# Patient Record
Sex: MALE | Race: OTHER | HISPANIC OR LATINO | URBAN - METROPOLITAN AREA
[De-identification: names, ages, dates, MRNs, and addresses within clinical notes are randomized per-mention and may not be internally consistent; named-entity substitution may affect disease eponyms.]

---

## 2020-02-03 ENCOUNTER — EMERGENCY (EMERGENCY)
Facility: HOSPITAL | Age: 50
LOS: 1 days | Discharge: HOME | End: 2020-02-03
Attending: EMERGENCY MEDICINE | Admitting: EMERGENCY MEDICINE
Payer: COMMERCIAL

## 2020-02-03 VITALS
HEART RATE: 99 BPM | TEMPERATURE: 98 F | SYSTOLIC BLOOD PRESSURE: 147 MMHG | WEIGHT: 147.05 LBS | OXYGEN SATURATION: 97 % | RESPIRATION RATE: 16 BRPM | HEIGHT: 64 IN | DIASTOLIC BLOOD PRESSURE: 98 MMHG

## 2020-02-03 LAB
ALBUMIN SERPL ELPH-MCNC: 3.5 G/DL — SIGNIFICANT CHANGE UP (ref 3.3–5)
ALP SERPL-CCNC: 116 U/L — SIGNIFICANT CHANGE UP (ref 40–120)
ALT FLD-CCNC: 28 U/L — SIGNIFICANT CHANGE UP (ref 12–78)
ANION GAP SERPL CALC-SCNC: 7 MMOL/L — SIGNIFICANT CHANGE UP (ref 5–17)
APTT BLD: 32.2 SEC — SIGNIFICANT CHANGE UP (ref 28.5–37)
AST SERPL-CCNC: 19 U/L — SIGNIFICANT CHANGE UP (ref 15–37)
BASOPHILS # BLD AUTO: 0.04 K/UL — SIGNIFICANT CHANGE UP (ref 0–0.2)
BASOPHILS NFR BLD AUTO: 0.5 % — SIGNIFICANT CHANGE UP (ref 0–2)
BILIRUB SERPL-MCNC: 0.2 MG/DL — SIGNIFICANT CHANGE UP (ref 0.2–1.2)
BUN SERPL-MCNC: 19 MG/DL — SIGNIFICANT CHANGE UP (ref 7–23)
CALCIUM SERPL-MCNC: 8.9 MG/DL — SIGNIFICANT CHANGE UP (ref 8.5–10.1)
CHLORIDE SERPL-SCNC: 103 MMOL/L — SIGNIFICANT CHANGE UP (ref 96–108)
CO2 SERPL-SCNC: 27 MMOL/L — SIGNIFICANT CHANGE UP (ref 22–31)
CREAT SERPL-MCNC: 0.93 MG/DL — SIGNIFICANT CHANGE UP (ref 0.5–1.3)
EOSINOPHIL # BLD AUTO: 0.16 K/UL — SIGNIFICANT CHANGE UP (ref 0–0.5)
EOSINOPHIL NFR BLD AUTO: 1.8 % — SIGNIFICANT CHANGE UP (ref 0–6)
FLU A RESULT: SIGNIFICANT CHANGE UP
FLU A RESULT: SIGNIFICANT CHANGE UP
FLUAV AG NPH QL: SIGNIFICANT CHANGE UP
FLUBV AG NPH QL: SIGNIFICANT CHANGE UP
GLUCOSE SERPL-MCNC: 273 MG/DL — HIGH (ref 70–99)
HCT VFR BLD CALC: 43.5 % — SIGNIFICANT CHANGE UP (ref 39–50)
HGB BLD-MCNC: 15.2 G/DL — SIGNIFICANT CHANGE UP (ref 13–17)
IMM GRANULOCYTES NFR BLD AUTO: 0.7 % — SIGNIFICANT CHANGE UP (ref 0–1.5)
INR BLD: 0.84 RATIO — LOW (ref 0.88–1.16)
LACTATE SERPL-SCNC: 1.8 MMOL/L — SIGNIFICANT CHANGE UP (ref 0.7–2)
LIDOCAIN IGE QN: 905 U/L — HIGH (ref 73–393)
LYMPHOCYTES # BLD AUTO: 3.5 K/UL — HIGH (ref 1–3.3)
LYMPHOCYTES # BLD AUTO: 39.5 % — SIGNIFICANT CHANGE UP (ref 13–44)
MCHC RBC-ENTMCNC: 28.6 PG — SIGNIFICANT CHANGE UP (ref 27–34)
MCHC RBC-ENTMCNC: 34.9 GM/DL — SIGNIFICANT CHANGE UP (ref 32–36)
MCV RBC AUTO: 81.9 FL — SIGNIFICANT CHANGE UP (ref 80–100)
MONOCYTES # BLD AUTO: 0.59 K/UL — SIGNIFICANT CHANGE UP (ref 0–0.9)
MONOCYTES NFR BLD AUTO: 6.7 % — SIGNIFICANT CHANGE UP (ref 2–14)
NEUTROPHILS # BLD AUTO: 4.5 K/UL — SIGNIFICANT CHANGE UP (ref 1.8–7.4)
NEUTROPHILS NFR BLD AUTO: 50.8 % — SIGNIFICANT CHANGE UP (ref 43–77)
NRBC # BLD: 0 /100 WBCS — SIGNIFICANT CHANGE UP (ref 0–0)
PLATELET # BLD AUTO: 227 K/UL — SIGNIFICANT CHANGE UP (ref 150–400)
POTASSIUM SERPL-MCNC: 4.2 MMOL/L — SIGNIFICANT CHANGE UP (ref 3.5–5.3)
POTASSIUM SERPL-SCNC: 4.2 MMOL/L — SIGNIFICANT CHANGE UP (ref 3.5–5.3)
PROT SERPL-MCNC: 7.4 G/DL — SIGNIFICANT CHANGE UP (ref 6–8.3)
PROTHROM AB SERPL-ACNC: 9.3 SEC — LOW (ref 10–12.9)
RBC # BLD: 5.31 M/UL — SIGNIFICANT CHANGE UP (ref 4.2–5.8)
RBC # FLD: 12.1 % — SIGNIFICANT CHANGE UP (ref 10.3–14.5)
RSV RESULT: SIGNIFICANT CHANGE UP
RSV RNA RESP QL NAA+PROBE: SIGNIFICANT CHANGE UP
SODIUM SERPL-SCNC: 137 MMOL/L — SIGNIFICANT CHANGE UP (ref 135–145)
TROPONIN I SERPL-MCNC: <.015 NG/ML — SIGNIFICANT CHANGE UP (ref 0.01–0.04)
WBC # BLD: 8.85 K/UL — SIGNIFICANT CHANGE UP (ref 3.8–10.5)
WBC # FLD AUTO: 8.85 K/UL — SIGNIFICANT CHANGE UP (ref 3.8–10.5)

## 2020-02-03 PROCEDURE — 74177 CT ABD & PELVIS W/CONTRAST: CPT | Mod: 26

## 2020-02-03 PROCEDURE — 93010 ELECTROCARDIOGRAM REPORT: CPT

## 2020-02-03 PROCEDURE — 99285 EMERGENCY DEPT VISIT HI MDM: CPT

## 2020-02-03 PROCEDURE — 73030 X-RAY EXAM OF SHOULDER: CPT | Mod: 26,LT

## 2020-02-03 PROCEDURE — 71046 X-RAY EXAM CHEST 2 VIEWS: CPT | Mod: 26

## 2020-02-03 RX ORDER — KETOROLAC TROMETHAMINE 30 MG/ML
30 SYRINGE (ML) INJECTION ONCE
Refills: 0 | Status: DISCONTINUED | OUTPATIENT
Start: 2020-02-03 | End: 2020-02-03

## 2020-02-03 RX ORDER — ONDANSETRON 8 MG/1
4 TABLET, FILM COATED ORAL ONCE
Refills: 0 | Status: COMPLETED | OUTPATIENT
Start: 2020-02-03 | End: 2020-02-03

## 2020-02-03 RX ORDER — SODIUM CHLORIDE 9 MG/ML
1000 INJECTION INTRAMUSCULAR; INTRAVENOUS; SUBCUTANEOUS ONCE
Refills: 0 | Status: COMPLETED | OUTPATIENT
Start: 2020-02-03 | End: 2020-02-03

## 2020-02-03 RX ADMIN — Medication 30 MILLIGRAM(S): at 22:47

## 2020-02-03 RX ADMIN — SODIUM CHLORIDE 1000 MILLILITER(S): 9 INJECTION INTRAMUSCULAR; INTRAVENOUS; SUBCUTANEOUS at 22:00

## 2020-02-03 RX ADMIN — SODIUM CHLORIDE 1000 MILLILITER(S): 9 INJECTION INTRAMUSCULAR; INTRAVENOUS; SUBCUTANEOUS at 22:47

## 2020-02-03 RX ADMIN — ONDANSETRON 4 MILLIGRAM(S): 8 TABLET, FILM COATED ORAL at 20:48

## 2020-02-03 RX ADMIN — SODIUM CHLORIDE 1000 MILLILITER(S): 9 INJECTION INTRAMUSCULAR; INTRAVENOUS; SUBCUTANEOUS at 20:49

## 2020-02-03 NOTE — ED PROVIDER NOTE - MUSCULOSKELETAL, MLM
Spine appears normal, range of motion is not limited, no muscle or joint tenderness nrom of shoulder

## 2020-02-03 NOTE — ED PROVIDER NOTE - NSFOLLOWUPINSTRUCTIONS_ED_ALL_ED_FT
Return to the ED for any new or worsening symptoms  Take your medication as prescribed  Naproxen 1 tab 2 times a day as needed for shoulder pain  Zofran per label instructions as needed for nausea   Flonase 2 sprays to each nostril daily   Singulair 1 tab daily   Clear liquids advance as tolerated  Follow up with an orthopedist if your shoulder pain continues   Follow up with a gastroenterologist call tomorrow to schedule a follow up  Advance activity as tolerated

## 2020-02-03 NOTE — ED PROVIDER NOTE - PATIENT PORTAL LINK FT
You can access the FollowMyHealth Patient Portal offered by [facility] by registering at the following website: [email or web address]. By joining [employer]’s FollowMyHealth portal, you will also be able to view your health information using other applications (apps) compatible with our system.

## 2020-02-03 NOTE — ED PROVIDER NOTE - OBJECTIVE STATEMENT
Pt is a 50 yo male with pmhx of dm and HLD c/o of chills and myalgias which started today. He reports that he has been experiencing nasal congestion, with associated chills, sore throat, nausea, and diffuse myalgias. He also reports that he has noted that he is experiencing left shoulder pain which is more significant then the rest of his myalgias.  Denies known fevers, V/D/C, CP, SOB, cough, abd pain. Pt denies pleuritic chest pain.  Pt did receive his flu vaccine this year. Pt denies sick contacts recent travels.

## 2020-02-03 NOTE — ED ADULT NURSE NOTE - CHPI ED NUR SYMPTOMS NEG
no decreased eating/drinking/no dizziness/no loss of consciousness/no vomiting/no back pain/no fever/no chills/no headache

## 2020-02-03 NOTE — ED PROVIDER NOTE - CLINICAL SUMMARY MEDICAL DECISION MAKING FREE TEXT BOX
Pt is a 50 yo male with chills congestion will get labs flu swab   left shoulder pain will get cardiac enzymes cxr

## 2020-02-03 NOTE — ED PROVIDER NOTE - ENMT, MLM
Airway patent, nasal congestion. Mouth with normal mucosa. Throat has no vesicles, no oropharyngeal exudates and uvula is midline.

## 2020-02-03 NOTE — ED PROVIDER NOTE - CARE PLAN
Principal Discharge DX:	URI (upper respiratory infection)  Secondary Diagnosis:	Pancreatitis  Secondary Diagnosis:	Shoulder pain

## 2020-02-03 NOTE — ED PROVIDER NOTE - CARE PROVIDER_API CALL
Ernesto Truong ()  Internal Medicine  [address] [place] [address]  Phone: [phone]  Fax: [phone]  Follow Up Time:     Neno Stoner)  Orthopaedic Surgery  [address] [place] [address]  Phone: [phone]  Fax: [phone]  Follow Up Time:

## 2020-02-03 NOTE — ED PROVIDER NOTE - ATTENDING CONTRIBUTION TO CARE
Pt is a 50 yo male who presents to the ED with a cc of chills and myalgias.  PMHx of Diabetes mellitus    Hypercholesteremia.  Pt reports that symptoms began today.  He reports that he has been experiencing nasal congestion, with associated chills, sore throat, nausea, and diffuse myalgias.  He also reports that he has noted that he is experiencing left shoulder pain which is more significant then the rest of his myalgias.  Denies known fevers, V/D/C, CP, SOB, cough, abd pain.  Pt denies pleuritic chest pain.  Pt did receive his flu vaccine this year.  On exam pt lying in bed NAD, NCAT, throat with diffuse erythema no exudate noted, heart RRR, lungs CTA, abd soft NT/ND.  Pt with what appears to be viral URI syndrome but given h/o left shoulder pain and pt known DM with exclude underlying cardiac disease.  Will obtain screening labs, chest x-ray, left shoulder x-ray, and swab for flu.  Will monitor

## 2020-02-03 NOTE — ED ADULT NURSE NOTE - OBJECTIVE STATEMENT
patient came in ED from home with c/o body ache and nausea X 16:30H today. afebrile. non-labored respiration noted. abdomen nondistended, nontender.

## 2020-02-04 VITALS
HEART RATE: 98 BPM | DIASTOLIC BLOOD PRESSURE: 67 MMHG | TEMPERATURE: 98 F | RESPIRATION RATE: 17 BRPM | OXYGEN SATURATION: 98 % | SYSTOLIC BLOOD PRESSURE: 133 MMHG

## 2020-02-04 LAB — TROPONIN I SERPL-MCNC: <.015 NG/ML — SIGNIFICANT CHANGE UP (ref 0.01–0.04)

## 2020-02-04 PROCEDURE — 36415 COLL VENOUS BLD VENIPUNCTURE: CPT

## 2020-02-04 PROCEDURE — 83690 ASSAY OF LIPASE: CPT

## 2020-02-04 PROCEDURE — 93005 ELECTROCARDIOGRAM TRACING: CPT

## 2020-02-04 PROCEDURE — 99284 EMERGENCY DEPT VISIT MOD MDM: CPT | Mod: 25

## 2020-02-04 PROCEDURE — 80053 COMPREHEN METABOLIC PANEL: CPT

## 2020-02-04 PROCEDURE — 87631 RESP VIRUS 3-5 TARGETS: CPT

## 2020-02-04 PROCEDURE — 71046 X-RAY EXAM CHEST 2 VIEWS: CPT

## 2020-02-04 PROCEDURE — 73030 X-RAY EXAM OF SHOULDER: CPT

## 2020-02-04 PROCEDURE — 85730 THROMBOPLASTIN TIME PARTIAL: CPT

## 2020-02-04 PROCEDURE — 83605 ASSAY OF LACTIC ACID: CPT

## 2020-02-04 PROCEDURE — 96361 HYDRATE IV INFUSION ADD-ON: CPT

## 2020-02-04 PROCEDURE — 84484 ASSAY OF TROPONIN QUANT: CPT

## 2020-02-04 PROCEDURE — 96375 TX/PRO/DX INJ NEW DRUG ADDON: CPT

## 2020-02-04 PROCEDURE — 74177 CT ABD & PELVIS W/CONTRAST: CPT

## 2020-02-04 PROCEDURE — 85610 PROTHROMBIN TIME: CPT

## 2020-02-04 PROCEDURE — 85027 COMPLETE CBC AUTOMATED: CPT

## 2020-02-04 PROCEDURE — 96374 THER/PROPH/DIAG INJ IV PUSH: CPT | Mod: XU

## 2020-02-04 RX ORDER — ONDANSETRON 8 MG/1
1 TABLET, FILM COATED ORAL
Qty: 15 | Refills: 0
Start: 2020-02-04 | End: 2020-02-08

## 2020-02-04 RX ORDER — FLUTICASONE PROPIONATE 50 MCG
2 SPRAY, SUSPENSION NASAL
Qty: 1 | Refills: 0
Start: 2020-02-04 | End: 2020-03-04

## 2020-02-04 RX ORDER — MONTELUKAST 4 MG/1
1 TABLET, CHEWABLE ORAL
Qty: 30 | Refills: 0
Start: 2020-02-04 | End: 2020-03-04

## 2020-02-04 RX ADMIN — Medication 30 MILLIGRAM(S): at 01:04

## 2020-03-31 ENCOUNTER — INPATIENT (INPATIENT)
Facility: HOSPITAL | Age: 50
LOS: 0 days | Discharge: HOME | DRG: 177 | End: 2020-04-01
Attending: FAMILY MEDICINE | Admitting: FAMILY MEDICINE
Payer: COMMERCIAL

## 2020-03-31 VITALS
HEART RATE: 95 BPM | OXYGEN SATURATION: 97 % | SYSTOLIC BLOOD PRESSURE: 119 MMHG | WEIGHT: 147.05 LBS | HEIGHT: 64 IN | RESPIRATION RATE: 18 BRPM | DIASTOLIC BLOOD PRESSURE: 77 MMHG | TEMPERATURE: 100 F

## 2020-03-31 DIAGNOSIS — E87.1 HYPO-OSMOLALITY AND HYPONATREMIA: ICD-10-CM

## 2020-03-31 DIAGNOSIS — B97.21 SARS-ASSOCIATED CORONAVIRUS AS THE CAUSE OF DISEASES CLASSIFIED ELSEWHERE: ICD-10-CM

## 2020-03-31 DIAGNOSIS — J12.9 VIRAL PNEUMONIA, UNSPECIFIED: ICD-10-CM

## 2020-03-31 DIAGNOSIS — Z29.9 ENCOUNTER FOR PROPHYLACTIC MEASURES, UNSPECIFIED: ICD-10-CM

## 2020-03-31 DIAGNOSIS — Z71.89 OTHER SPECIFIED COUNSELING: ICD-10-CM

## 2020-03-31 DIAGNOSIS — E78.00 PURE HYPERCHOLESTEROLEMIA, UNSPECIFIED: ICD-10-CM

## 2020-03-31 DIAGNOSIS — E11.9 TYPE 2 DIABETES MELLITUS WITHOUT COMPLICATIONS: ICD-10-CM

## 2020-03-31 LAB
ALBUMIN SERPL ELPH-MCNC: 2.7 G/DL — LOW (ref 3.3–5)
ALP SERPL-CCNC: 60 U/L — SIGNIFICANT CHANGE UP (ref 40–120)
ALT FLD-CCNC: 19 U/L — SIGNIFICANT CHANGE UP (ref 12–78)
ANION GAP SERPL CALC-SCNC: 6 MMOL/L — SIGNIFICANT CHANGE UP (ref 5–17)
AST SERPL-CCNC: 27 U/L — SIGNIFICANT CHANGE UP (ref 15–37)
BASOPHILS # BLD AUTO: 0 K/UL — SIGNIFICANT CHANGE UP (ref 0–0.2)
BASOPHILS NFR BLD AUTO: 0 % — SIGNIFICANT CHANGE UP (ref 0–2)
BILIRUB SERPL-MCNC: 0.3 MG/DL — SIGNIFICANT CHANGE UP (ref 0.2–1.2)
BUN SERPL-MCNC: 15 MG/DL — SIGNIFICANT CHANGE UP (ref 7–23)
CALCIUM SERPL-MCNC: 9 MG/DL — SIGNIFICANT CHANGE UP (ref 8.5–10.1)
CHLORIDE SERPL-SCNC: 99 MMOL/L — SIGNIFICANT CHANGE UP (ref 96–108)
CO2 SERPL-SCNC: 27 MMOL/L — SIGNIFICANT CHANGE UP (ref 22–31)
CREAT SERPL-MCNC: 1 MG/DL — SIGNIFICANT CHANGE UP (ref 0.5–1.3)
EOSINOPHIL # BLD AUTO: 0 K/UL — SIGNIFICANT CHANGE UP (ref 0–0.5)
EOSINOPHIL NFR BLD AUTO: 0 % — SIGNIFICANT CHANGE UP (ref 0–6)
GLUCOSE SERPL-MCNC: 326 MG/DL — HIGH (ref 70–99)
HCT VFR BLD CALC: 42.1 % — SIGNIFICANT CHANGE UP (ref 39–50)
HGB BLD-MCNC: 14.5 G/DL — SIGNIFICANT CHANGE UP (ref 13–17)
LACTATE SERPL-SCNC: 1.9 MMOL/L — SIGNIFICANT CHANGE UP (ref 0.7–2)
LYMPHOCYTES # BLD AUTO: 0.61 K/UL — LOW (ref 1–3.3)
LYMPHOCYTES # BLD AUTO: 11 % — LOW (ref 13–44)
MCHC RBC-ENTMCNC: 28.7 PG — SIGNIFICANT CHANGE UP (ref 27–34)
MCHC RBC-ENTMCNC: 34.4 GM/DL — SIGNIFICANT CHANGE UP (ref 32–36)
MCV RBC AUTO: 83.2 FL — SIGNIFICANT CHANGE UP (ref 80–100)
MONOCYTES # BLD AUTO: 0.28 K/UL — SIGNIFICANT CHANGE UP (ref 0–0.9)
MONOCYTES NFR BLD AUTO: 5 % — SIGNIFICANT CHANGE UP (ref 2–14)
NEUTROPHILS # BLD AUTO: 4.05 K/UL — SIGNIFICANT CHANGE UP (ref 1.8–7.4)
NEUTROPHILS NFR BLD AUTO: 66 % — SIGNIFICANT CHANGE UP (ref 43–77)
NRBC # BLD: SIGNIFICANT CHANGE UP /100 WBCS (ref 0–0)
PLATELET # BLD AUTO: 231 K/UL — SIGNIFICANT CHANGE UP (ref 150–400)
POTASSIUM SERPL-MCNC: 4.7 MMOL/L — SIGNIFICANT CHANGE UP (ref 3.5–5.3)
POTASSIUM SERPL-SCNC: 4.7 MMOL/L — SIGNIFICANT CHANGE UP (ref 3.5–5.3)
PROT SERPL-MCNC: 7.8 G/DL — SIGNIFICANT CHANGE UP (ref 6–8.3)
RBC # BLD: 5.06 M/UL — SIGNIFICANT CHANGE UP (ref 4.2–5.8)
RBC # FLD: 12.3 % — SIGNIFICANT CHANGE UP (ref 10.3–14.5)
SODIUM SERPL-SCNC: 132 MMOL/L — LOW (ref 135–145)
WBC # BLD: 5.55 K/UL — SIGNIFICANT CHANGE UP (ref 3.8–10.5)
WBC # FLD AUTO: 5.55 K/UL — SIGNIFICANT CHANGE UP (ref 3.8–10.5)

## 2020-03-31 PROCEDURE — 99223 1ST HOSP IP/OBS HIGH 75: CPT | Mod: GC

## 2020-03-31 PROCEDURE — 71045 X-RAY EXAM CHEST 1 VIEW: CPT | Mod: 26

## 2020-03-31 PROCEDURE — 99285 EMERGENCY DEPT VISIT HI MDM: CPT

## 2020-03-31 RX ORDER — OMEGA-3 ACID ETHYL ESTERS 1 G
4 CAPSULE ORAL DAILY
Refills: 0 | Status: DISCONTINUED | OUTPATIENT
Start: 2020-03-31 | End: 2020-04-01

## 2020-03-31 RX ORDER — ACETAMINOPHEN 500 MG
650 TABLET ORAL EVERY 4 HOURS
Refills: 0 | Status: DISCONTINUED | OUTPATIENT
Start: 2020-03-31 | End: 2020-04-01

## 2020-03-31 RX ORDER — GUAIFENESIN/DEXTROMETHORPHAN 600MG-30MG
10 TABLET, EXTENDED RELEASE 12 HR ORAL EVERY 4 HOURS
Refills: 0 | Status: DISCONTINUED | OUTPATIENT
Start: 2020-03-31 | End: 2020-04-01

## 2020-03-31 RX ORDER — INSULIN LISPRO 100/ML
VIAL (ML) SUBCUTANEOUS
Refills: 0 | Status: DISCONTINUED | OUTPATIENT
Start: 2020-03-31 | End: 2020-04-01

## 2020-03-31 RX ORDER — ENOXAPARIN SODIUM 100 MG/ML
40 INJECTION SUBCUTANEOUS DAILY
Refills: 0 | Status: DISCONTINUED | OUTPATIENT
Start: 2020-03-31 | End: 2020-04-01

## 2020-03-31 RX ORDER — GLUCAGON INJECTION, SOLUTION 0.5 MG/.1ML
1 INJECTION, SOLUTION SUBCUTANEOUS ONCE
Refills: 0 | Status: DISCONTINUED | OUTPATIENT
Start: 2020-03-31 | End: 2020-04-01

## 2020-03-31 RX ORDER — SODIUM CHLORIDE 9 MG/ML
1000 INJECTION INTRAMUSCULAR; INTRAVENOUS; SUBCUTANEOUS ONCE
Refills: 0 | Status: COMPLETED | OUTPATIENT
Start: 2020-03-31 | End: 2020-03-31

## 2020-03-31 RX ORDER — ALBUTEROL 90 UG/1
2 AEROSOL, METERED ORAL EVERY 4 HOURS
Refills: 0 | Status: DISCONTINUED | OUTPATIENT
Start: 2020-03-31 | End: 2020-04-01

## 2020-03-31 RX ORDER — DEXTROSE 50 % IN WATER 50 %
15 SYRINGE (ML) INTRAVENOUS ONCE
Refills: 0 | Status: DISCONTINUED | OUTPATIENT
Start: 2020-03-31 | End: 2020-04-01

## 2020-03-31 RX ORDER — SODIUM CHLORIDE 9 MG/ML
1000 INJECTION, SOLUTION INTRAVENOUS
Refills: 0 | Status: DISCONTINUED | OUTPATIENT
Start: 2020-03-31 | End: 2020-04-01

## 2020-03-31 RX ORDER — DEXTROSE 50 % IN WATER 50 %
12.5 SYRINGE (ML) INTRAVENOUS ONCE
Refills: 0 | Status: DISCONTINUED | OUTPATIENT
Start: 2020-03-31 | End: 2020-04-01

## 2020-03-31 RX ORDER — ACETAMINOPHEN 500 MG
650 TABLET ORAL ONCE
Refills: 0 | Status: COMPLETED | OUTPATIENT
Start: 2020-03-31 | End: 2020-03-31

## 2020-03-31 RX ORDER — AZITHROMYCIN 500 MG/1
1 TABLET, FILM COATED ORAL
Qty: 0 | Refills: 0 | DISCHARGE
Start: 2020-03-31 | End: 2020-04-04

## 2020-03-31 RX ADMIN — SODIUM CHLORIDE 1000 MILLILITER(S): 9 INJECTION INTRAMUSCULAR; INTRAVENOUS; SUBCUTANEOUS at 14:12

## 2020-03-31 RX ADMIN — Medication 3: at 18:17

## 2020-03-31 RX ADMIN — Medication 650 MILLIGRAM(S): at 14:12

## 2020-03-31 RX ADMIN — SODIUM CHLORIDE 1000 MILLILITER(S): 9 INJECTION INTRAMUSCULAR; INTRAVENOUS; SUBCUTANEOUS at 15:12

## 2020-03-31 NOTE — ED PROVIDER NOTE - CONSTITUTIONAL, MLM
normal... Well appearing, awake, alert, oriented to person, place, time/situation and in no apparent distress. Well appearing, awake, alert, oriented to person, place, time/situation mild tachypnea 91% room air

## 2020-03-31 NOTE — H&P ADULT - NSHPPHYSICALEXAM_GEN_ALL_CORE
Vital Signs Last 24 Hrs  T(C): 36.9 (31 Mar 2020 18:15), Max: 37.7 (31 Mar 2020 12:22)  T(F): 98.5 (31 Mar 2020 18:15), Max: 99.9 (31 Mar 2020 12:22)  HR: 84 (31 Mar 2020 18:15) (84 - 95)  BP: 117/74 (31 Mar 2020 18:15) (116/70 - 119/77)  BP(mean): --  RR: 18 (31 Mar 2020 18:15) (18 - 18)  SpO2: 94% (31 Mar 2020 18:15) (94% - 97%)    Patient comfortable appearing , not in acute distress , oxygen saturation 95% on ra     please refer to ED exam for complete exam details

## 2020-03-31 NOTE — ED PROVIDER NOTE - OBJECTIVE STATEMENT
Pt is a 50 yo male with pmhx of dm and hld c/o of worsening cough sob fever chills. Pt states he was tested for covid 2 weeks ago and tested positive. Pt has been taking Tylenol last dose last night. But shortness of breath worse denies any nvd abdominal pain. Pt co of chest pain with cough.

## 2020-03-31 NOTE — ED ADULT NURSE NOTE - NSIMPLEMENTINTERV_GEN_ALL_ED
Implemented All Universal Safety Interventions:  [place] to call system. Call bell, personal items and telephone within reach. Instruct patient to call for assistance. Room bathroom lighting operational. Non-slip footwear when patient is off stretcher. Physically safe environment: no spills, clutter or unnecessary equipment. Stretcher in lowest position, wheels locked, appropriate side rails in place.

## 2020-03-31 NOTE — ED PROVIDER NOTE - ATTENDING CONTRIBUTION TO CARE
50 yo M p/w coug, URI sx x past ~ 2 weeks. Pt with known COVID positive. Now pt with some inc dyspnea over past few days. No chest pain. no neck pain / stiffness. Pos gen achiness.   No other acute co;  exam: MM Moist. neck supple. slight tachypnea. no acc muscle use. non-toxic appearing. SOme hypoxia noted, exp with exertion.  Check labs, xr, ? TBA

## 2020-03-31 NOTE — H&P ADULT - PROBLEM SELECTOR PLAN 3
Continue home Vascepa 2g bid (patient was prescribed this per pharmacy at the beginning of March) DM2  f/u HgA1c  Start low dose insulin sliding scale with accuchecks  Hypogylecmia protocol DM2 on Lantus and humalog at home. Family did not know dosing and [facility] not [facility] dose [facility]. Recommend calling Endocrine or PCP. Add Lantus qhs and Humalog depending on accuchecks.   f/u HgA1c  Start low dose insulin sliding scale with accuchecks  Hypogylecmia protocol

## 2020-03-31 NOTE — H&P ADULT - PROBLEM SELECTOR PLAN 4
In setting of likely decreased ambulatory status while being hospitalized, recommend lovenox 40mg qd.   IMPROVE VTE Individual Risk Assessment          RISK                                                          Points  [  ] Previous VTE                                                3  [  ] Thrombophilia                                             2  [  ] Lower limb paralysis                                   2        (unable to hold up >15 seconds)    [  ] Current Cancer                                             2         (within 6 months)  [  ] Immobilization > 24 hrs                              1  [  ] ICU/CCU stay > 24 hours                             1  [  ] Age > 60                                                         1    IMPROVE VTE Score: 0 Continue home Vascepa 2g bid (patient was prescribed this per pharmacy at the beginning of March) Continue home Vascepa 2g bid (patient was prescribed this per pharmacy at the beginning of March). Therapeutic interchange with [place] 3 while in hospital.

## 2020-03-31 NOTE — H&P ADULT - PROBLEM SELECTOR PLAN 1
Admit to F for PNA in setting of known +COVID19 viral illness given worsening clinical presentation, CXR w/ new b/l patchy opacities  - NLR: 6.64, NEWS2: 3   - may use O2 NC, Venturi Mask, NRB as needed depending on oxygen demand  - Avoid HFNC/NIPPV/aerosolizing procedures i.e. nebulizations  - Avoid NSAIDs, use tylenol PRN fevers  - Maintain strict isolation precautions, use proper PPE   - daily CBC with diff to follow eosinophils, lymphocytes and neutrophils; daily CK  - given risk factors: check LDH, CRP, ferritin, ESR, PT/PTT, CK, lactate, troponin, NLR, d-dimer, procalcitonin  - baseline EKG - monitor for cardiac decompensation, monitor telemetry  - monitor respiratory status closely   - Code Status: TBD Admit to F for PNA in setting of known +COVID19 viral illness given worsening clinical presentation, CXR w/ new b/l patchy opacities  - NLR: 6.64, NEWS2: 3   - may use O2 NC, Venturi Mask, NRB as needed depending on oxygen demand  - Avoid HFNC/NIPPV/aerosolizing procedures i.e. nebulizations  - Avoid NSAIDs, use tylenol PRN fevers  - will hold off on continuing home Azithromycin   - Maintain strict isolation precautions, use proper PPE   - daily CBC with diff to follow eosinophils, lymphocytes and neutrophils; daily CK  - given risk factors: check LDH, CRP, ferritin, ESR, PT/PTT, CK, lactate, troponin, NLR, d-dimer, procalcitonin  - baseline EKG - monitor for cardiac decompensation, monitor telemetry  - monitor respiratory status closely   - Code Status: TBD Admit to F for PNA in setting of known +COVID19 viral illness given worsening clinical presentation, CXR w/ new b/l patchy opacities  - NLR: 6.64, NEWS2: 3   - may use O2 NC, Venturi Mask, NRB as needed depending on oxygen demand  - Avoid HFNC/NIPPV/aerosolizing procedures i.e. nebulizations  - Avoid NSAIDs, use tylenol PRN fevers  - will hold off on continuing home Azithromycin   - Maintain strict isolation precautions, use proper PPE   - daily CBC with diff to follow eosinophils, lymphocytes and neutrophils; daily CK  - given risk factors: check LDH, CRP, ferritin, ESR, PT/PTT, CK, lactate, troponin, NLR, d-dimer, procalcitonin  - baseline EKG - monitor for cardiac decompensation, monitor telemetry  - monitor respiratory status closely   - Code Status: full code

## 2020-03-31 NOTE — H&P ADULT - ATTENDING COMMENTS
49M w/ PMHx of DM2 and HLD  p/w cough, sob, fevers, and chills  x 2 weeks ago s/p azithromycin , tested  +COVID as outpatient , cxr w/ findings c/w viral pneumonia , will monitor closely , continue oxygen support as needed , obtain ekg to check for qtc , can continue insulin correction scale . Patient Full code .

## 2020-03-31 NOTE — H&P ADULT - PROBLEM SELECTOR PLAN 2
DM2  f/u HgA1c  Start low dose insulin sliding scale with accuchecks  Hypogylecmia protocol Na 132 on admission, likely in setting of decreased PO intake.   - monitor CMP and for clinical signs of hyponatremia (n/v, change in mental status, etc)

## 2020-03-31 NOTE — ED PROVIDER NOTE - CLINICAL SUMMARY MEDICAL DECISION MAKING FREE TEXT BOX
Pt is a 50 yo male with sob +covid o2 sat with walking 88 % will get labs cxr cultures give Tylenol and fluids

## 2020-03-31 NOTE — H&P ADULT - PROBLEM SELECTOR PLAN 5
In setting of likely decreased ambulatory status while being hospitalized, recommend lovenox 40mg qd.   IMPROVE VTE Individual Risk Assessment          RISK                                                          Points  [  ] Previous VTE                                                3  [  ] Thrombophilia                                             2  [  ] Lower limb paralysis                                   2        (unable to hold up >15 seconds)    [  ] Current Cancer                                             2         (within 6 months)  [  ] Immobilization > 24 hrs                              1  [  ] ICU/CCU stay > 24 hours                             1  [  ] Age > 60                                                         1    IMPROVE VTE Score: 0

## 2020-03-31 NOTE — H&P ADULT - ASSESSMENT
49 year old Male with PMHx of DM2 and HLD presenting with c/o worsening cough, sob, fevers, and chills which started 2 weeks ago (3/17), admitted for known +COVID pneumonia based on clinical presentation and imaging.

## 2020-03-31 NOTE — H&P ADULT - HISTORY OF PRESENT ILLNESS
49 year old Male with PMHx of DM2 and HLD presenting with c/o worsening cough, sob, fevers, and chills which started 2 weeks ago (3/17). He reports a +COVID test from outpatient facility.   Pt did receive his flu vaccine this year. [facility], Patient was prescribed Azithromycin 500mg qd x 5 days (starting 3/30) as well as Z-real (3/21).     The date the pt first felt unwell: 3/17  Fever or chills: yes [x  ]   no [  ]   - Tmax:   Fatigue, malaise or generalized weakness: yes [ x ]   no [  ]  Shortness of breath/dyspnea: yes [ x ]   no [  ]  Cough: yes [ x ]   no [  ], sputum production: yes [  ]   no [  ]  Anorexia/po intolerance: yes [  ]   no [  ]  Chest pain or chest tightness: yes [  ]   no [  ]  Myalgias: yes [  x]   no [  ]  Headache: yes [  ]   no [  ]  Sore throat: yes [  ]   no [  ]  Rhinorrhea: yes [  ]   no [  ]  Abd pain: yes [  ]   no [  ]  Nausea: yes [  ]   no [  ]  Vomiting: yes [  ]   no [  ]  Diarrhea: yes [  ]   no [  ]  Loss of sense of smell/anosmia: yes [  ]   no [  ]  Conjunctivitis: yes [  ]   no [  ]  Recent travel: yes [  ]   no [  ] - Location:   Any sick contacts: yes [  ]   no [  ]  Close contact with someone confirmed positive with COVID-19 / SARS-CoV2 in the last 14 days: yes [  ]   no [  ]  Other associated complaints:   Code status:     In the ED, the patient's vital signs were: T: 99.9, HR 95, /77, R: 18, SpO2 97% on RA. Per ED provider note, patient requiring 2L NC after SpO2 88% while ambulating.   CBC WNL, diff sig for lymphocytes 11%. -NLR: 6.64, NEWS2: 3, CMP sig for Na 132, glucose 326. Lactate 1.9. CXR: New bilateral airspace opacities. No pleural effusion. Heart size cannot be accurately assessed in this projection. EKG ordered in ED.   He received 650mg PO Tylenol 1 and 1L NS IVF bolus. 49 year old Male with PMHx of DM2 and HLD presenting with c/o worsening cough, sob, fevers, and chills which started 2 weeks ago (3/17). Daughter Elvira provided history as patient is Hebrew Speaking. He reports a +COVID test from outpatient ([facility]) facility on Tuesday and received results yesterday. He has sick contact at home as his wife tested positive for COVID 19 as well. He was taking tylenol at home and Azithromycin as well as Ventolin inhaler. Pt did receive his flu vaccine this year. [facility], Patient was prescribed Azithromycin 500mg qd x 5 days (starting 3/30) as well as Z-real (3/21).       The date the pt first felt unwell: 3/17  Fever or chills: yes [x  ]   no [  ]   - Tmax: 103 (3/30)   Fatigue, malaise or generalized weakness: yes [ x ]   no [  ]  Shortness of breath/dyspnea: yes [ x ]   no [  ]  Cough: yes [ x ]   no [  ], sputum production: yes [  ]   no [x  ]  Anorexia/po intolerance: yes [ x ]   no [  ]  Chest pain or chest tightness: yes [ x, with coughing ]   no [  ]  Myalgias: yes [  x]   no [  ]  Headache: yes [  ]   no [ x ]  Sore throat: yes [  ]   no [ x ]  Rhinorrhea: yes [  ]   no [  ]  Abd pain: yes [  ]   no [x  ]  Nausea: yes [ x ]   no [  ]  Vomiting: yes [  ]   no [ x ]  Diarrhea: yes [ x ]   no [  ]  Loss of sense of smell/anosmia: yes [  ]   no [ x ]  Conjunctivitis: yes [x  ]   no [  ]  Recent travel: yes [  ]   no [ x ] - Location:   Any sick contacts: yes [ x ]   no [  ]- His wife tested positive for COVID 19.   Close contact with someone confirmed positive with COVID-19 / SARS-CoV2 in the last 14 days: yes [x  ]   no [  ]  Code status: Full Code     In the ED, the patient's vital signs were: T: 99.9, HR 95, /77, R: 18, SpO2 97% on RA. Per ED provider note, patient requiring 2L NC after SpO2 88% while ambulating.   CBC WNL, diff sig for lymphocytes 11%. -NLR: 6.64, NEWS2: 3, CMP sig for Na 132, glucose 326. Lactate 1.9. CXR: New bilateral airspace opacities. No pleural effusion. Heart size cannot be accurately assessed in this projection. EKG ordered in ED. He received 650mg PO Tylenol 1 and 1L NS IVF bolus.

## 2020-04-01 ENCOUNTER — TRANSCRIPTION ENCOUNTER (OUTPATIENT)
Age: 50
End: 2020-04-01

## 2020-04-01 VITALS
OXYGEN SATURATION: 95 % | SYSTOLIC BLOOD PRESSURE: 132 MMHG | HEART RATE: 86 BPM | DIASTOLIC BLOOD PRESSURE: 64 MMHG | RESPIRATION RATE: 18 BRPM | TEMPERATURE: 99 F

## 2020-04-01 DIAGNOSIS — E11.65 TYPE 2 DIABETES MELLITUS WITH HYPERGLYCEMIA: ICD-10-CM

## 2020-04-01 LAB
ALBUMIN SERPL ELPH-MCNC: 2.4 G/DL — LOW (ref 3.3–5)
ALP SERPL-CCNC: 52 U/L — SIGNIFICANT CHANGE UP (ref 40–120)
ALT FLD-CCNC: 17 U/L — SIGNIFICANT CHANGE UP (ref 12–78)
ANION GAP SERPL CALC-SCNC: 5 MMOL/L — SIGNIFICANT CHANGE UP (ref 5–17)
AST SERPL-CCNC: 24 U/L — SIGNIFICANT CHANGE UP (ref 15–37)
BASOPHILS # BLD AUTO: 0.02 K/UL — SIGNIFICANT CHANGE UP (ref 0–0.2)
BASOPHILS NFR BLD AUTO: 0.4 % — SIGNIFICANT CHANGE UP (ref 0–2)
BILIRUB SERPL-MCNC: 0.4 MG/DL — SIGNIFICANT CHANGE UP (ref 0.2–1.2)
BUN SERPL-MCNC: 12 MG/DL — SIGNIFICANT CHANGE UP (ref 7–23)
CALCIUM SERPL-MCNC: 8.7 MG/DL — SIGNIFICANT CHANGE UP (ref 8.5–10.1)
CHLORIDE SERPL-SCNC: 103 MMOL/L — SIGNIFICANT CHANGE UP (ref 96–108)
CO2 SERPL-SCNC: 28 MMOL/L — SIGNIFICANT CHANGE UP (ref 22–31)
CREAT SERPL-MCNC: 0.8 MG/DL — SIGNIFICANT CHANGE UP (ref 0.5–1.3)
EOSINOPHIL # BLD AUTO: 0.04 K/UL — SIGNIFICANT CHANGE UP (ref 0–0.5)
EOSINOPHIL NFR BLD AUTO: 0.8 % — SIGNIFICANT CHANGE UP (ref 0–6)
GLUCOSE SERPL-MCNC: 248 MG/DL — HIGH (ref 70–99)
HBA1C BLD-MCNC: 10.8 % — HIGH (ref 4–5.6)
HCT VFR BLD CALC: 39.1 % — SIGNIFICANT CHANGE UP (ref 39–50)
HGB BLD-MCNC: 13.5 G/DL — SIGNIFICANT CHANGE UP (ref 13–17)
IMM GRANULOCYTES NFR BLD AUTO: 0.6 % — SIGNIFICANT CHANGE UP (ref 0–1.5)
LYMPHOCYTES # BLD AUTO: 1.66 K/UL — SIGNIFICANT CHANGE UP (ref 1–3.3)
LYMPHOCYTES # BLD AUTO: 34.1 % — SIGNIFICANT CHANGE UP (ref 13–44)
MCHC RBC-ENTMCNC: 29 PG — SIGNIFICANT CHANGE UP (ref 27–34)
MCHC RBC-ENTMCNC: 34.5 GM/DL — SIGNIFICANT CHANGE UP (ref 32–36)
MCV RBC AUTO: 83.9 FL — SIGNIFICANT CHANGE UP (ref 80–100)
MONOCYTES # BLD AUTO: 0.45 K/UL — SIGNIFICANT CHANGE UP (ref 0–0.9)
MONOCYTES NFR BLD AUTO: 9.2 % — SIGNIFICANT CHANGE UP (ref 2–14)
NEUTROPHILS # BLD AUTO: 2.67 K/UL — SIGNIFICANT CHANGE UP (ref 1.8–7.4)
NEUTROPHILS NFR BLD AUTO: 54.9 % — SIGNIFICANT CHANGE UP (ref 43–77)
NRBC # BLD: 0 /100 WBCS — SIGNIFICANT CHANGE UP (ref 0–0)
PLATELET # BLD AUTO: 233 K/UL — SIGNIFICANT CHANGE UP (ref 150–400)
POTASSIUM SERPL-MCNC: 4.3 MMOL/L — SIGNIFICANT CHANGE UP (ref 3.5–5.3)
POTASSIUM SERPL-SCNC: 4.3 MMOL/L — SIGNIFICANT CHANGE UP (ref 3.5–5.3)
PROT SERPL-MCNC: 7.1 G/DL — SIGNIFICANT CHANGE UP (ref 6–8.3)
RBC # BLD: 4.66 M/UL — SIGNIFICANT CHANGE UP (ref 4.2–5.8)
RBC # FLD: 12.2 % — SIGNIFICANT CHANGE UP (ref 10.3–14.5)
SODIUM SERPL-SCNC: 136 MMOL/L — SIGNIFICANT CHANGE UP (ref 135–145)
WBC # BLD: 4.87 K/UL — SIGNIFICANT CHANGE UP (ref 3.8–10.5)
WBC # FLD AUTO: 4.87 K/UL — SIGNIFICANT CHANGE UP (ref 3.8–10.5)

## 2020-04-01 PROCEDURE — 71045 X-RAY EXAM CHEST 1 VIEW: CPT

## 2020-04-01 PROCEDURE — 80053 COMPREHEN METABOLIC PANEL: CPT

## 2020-04-01 PROCEDURE — 36415 COLL VENOUS BLD VENIPUNCTURE: CPT

## 2020-04-01 PROCEDURE — 96360 HYDRATION IV INFUSION INIT: CPT

## 2020-04-01 PROCEDURE — 85027 COMPLETE CBC AUTOMATED: CPT

## 2020-04-01 PROCEDURE — 93010 ELECTROCARDIOGRAM REPORT: CPT

## 2020-04-01 PROCEDURE — 99285 EMERGENCY DEPT VISIT HI MDM: CPT | Mod: 25

## 2020-04-01 PROCEDURE — 87040 BLOOD CULTURE FOR BACTERIA: CPT

## 2020-04-01 PROCEDURE — 83036 HEMOGLOBIN GLYCOSYLATED A1C: CPT

## 2020-04-01 PROCEDURE — 93005 ELECTROCARDIOGRAM TRACING: CPT

## 2020-04-01 PROCEDURE — 82962 GLUCOSE BLOOD TEST: CPT

## 2020-04-01 PROCEDURE — 83605 ASSAY OF LACTIC ACID: CPT

## 2020-04-01 PROCEDURE — 99239 HOSP IP/OBS DSCHRG MGMT >30: CPT | Mod: GC

## 2020-04-01 RX ORDER — INSULIN LISPRO 100/ML
VIAL (ML) SUBCUTANEOUS
Refills: 0 | Status: DISCONTINUED | OUTPATIENT
Start: 2020-04-01 | End: 2020-04-01

## 2020-04-01 RX ORDER — GUAIFENESIN/DEXTROMETHORPHAN 600MG-30MG
10 TABLET, EXTENDED RELEASE 12 HR ORAL
Qty: 0 | Refills: 0 | DISCHARGE
Start: 2020-04-01

## 2020-04-01 RX ORDER — ACETAMINOPHEN 500 MG
2 TABLET ORAL
Qty: 0 | Refills: 0 | DISCHARGE
Start: 2020-04-01

## 2020-04-01 RX ORDER — INSULIN GLARGINE 100 [IU]/ML
0 INJECTION, SOLUTION SUBCUTANEOUS
Qty: 0 | Refills: 0 | DISCHARGE

## 2020-04-01 RX ORDER — INSULIN GLARGINE 100 [IU]/ML
20 INJECTION, SOLUTION SUBCUTANEOUS AT BEDTIME
Refills: 0 | Status: DISCONTINUED | OUTPATIENT
Start: 2020-04-01 | End: 2020-04-01

## 2020-04-01 RX ADMIN — Medication 4 GRAM(S): at 15:10

## 2020-04-01 RX ADMIN — Medication 650 MILLIGRAM(S): at 00:42

## 2020-04-01 RX ADMIN — Medication 4: at 17:41

## 2020-04-01 RX ADMIN — ENOXAPARIN SODIUM 40 MILLIGRAM(S): 100 INJECTION SUBCUTANEOUS at 12:00

## 2020-04-01 RX ADMIN — Medication 4: at 13:30

## 2020-04-01 RX ADMIN — Medication 3: at 06:47

## 2020-04-01 NOTE — PROGRESS NOTE ADULT - PROBLEM SELECTOR PLAN 4
Continue home Vascepa 2g bid (patient was prescribed this per pharmacy at the beginning of March). Therapeutic interchange with [place] 3 while in hospital.

## 2020-04-01 NOTE — PROGRESS NOTE ADULT - PROBLEM SELECTOR PLAN 1
Admit to F for PNA in setting of known +COVID19 viral illness given worsening clinical presentation, CXR w/ new b/l patchy opacities  - on admission NLR: 6.64, NEWS2: 3, NLR 4/1 = 1.6.    - may use O2 NC, Venturi Mask, NRB as needed depending on oxygen demand  - Avoid HFNC/NIPPV/aerosolizing procedures i.e. nebulizations  - Avoid NSAIDs, use tylenol PRN fevers  - will hold off on continuing home Azithromycin   - Maintain strict isolation precautions, use proper PPE   - daily CBC with diff to follow eosinophils, lymphocytes and neutrophils; daily CK  - given risk factors: check LDH, CRP, ferritin, ESR, PT/PTT, CK, lactate, troponin, NLR, d-dimer, procalcitonin  - baseline EKG - monitor for cardiac decompensation, monitor telemetry  - monitor respiratory status closely   - Code Status: full code  - on RA

## 2020-04-01 NOTE — DISCHARGE NOTE NURSING/CASE MANAGEMENT/SOCIAL WORK - PATIENT PORTAL LINK FT
You can access the FollowMyHealth Patient Portal offered by [facility] by registering at the following website: [email or web address]. By joining [employer]’s FollowMyHealth portal, you will also be able to view your health information using other applications (apps) compatible with our system.

## 2020-04-01 NOTE — PROGRESS NOTE ADULT - PROBLEM SELECTOR PLAN 2
Na 132 on admission, likely in setting of decreased PO intake.   - monitor CMP and for clinical signs of hyponatremia (n/v, change in mental status, etc)  - now resolved

## 2020-04-01 NOTE — DISCHARGE NOTE PROVIDER - CARE PROVIDER_API CALL
Dr Bubba casanova  your primary medical provider  Phone: (   )    -  Fax: (   )    -  Established Patient  Follow Up Time: 1 week

## 2020-04-01 NOTE — CONSULT NOTE ADULT - PROBLEM SELECTOR RECOMMENDATION 9
cont lantus 20 units daily  cont mod dose humalog scale coverage qac/qhs  cont cons cho diet  goal bg 100-180 in hosp setting

## 2020-04-01 NOTE — PROGRESS NOTE ADULT - SUBJECTIVE AND OBJECTIVE BOX
This progress note was completed in part by resident acting as telephonic scribe during COVID-19 crisis. Physical exam was completed by attending physician, and findings below are those of the attending physician, and have been reviewed in detail.    Patient is a 49y old  Male who presents with a chief complaint of COVID positive outpt, sx onset 3/17, (31 Mar 2020 16:23)      FROM ADMISSION H+P:   HPI:  49 year old Male with PMHx of DM2 and HLD presenting with c/o worsening cough, sob, fevers, and chills which started 2 weeks ago (3/17). Daughter Elvira provided history as patient is Mong[place]ian Speaking. He reports a +COVID test from outpatient ([facility]) facility on Tuesday and received results yesterday. He has sick contact at home as his wife tested positive for COVID 19 as well. He was taking tylenol at home and Azithromycin as well as Ventolin inhaler. Pt did receive his flu vaccine this year. [facility], Patient was prescribed Azithromycin 500mg qd x 5 days (starting 3/30) as well as Z-real (3/21).       The date the pt first felt unwell: 3/17  Fever or chills: yes [x  ]   no [  ]   - Tmax: 103 (3/30)   Fatigue, malaise or generalized weakness: yes [ x ]   no [  ]  Shortness of breath/dyspnea: yes [ x ]   no [  ]  Cough: yes [ x ]   no [  ], sputum production: yes [  ]   no [x  ]  Anorexia/po intolerance: yes [ x ]   no [  ]  Chest pain or chest tightness: yes [ x, with coughing ]   no [  ]  Myalgias: yes [  x]   no [  ]  Headache: yes [  ]   no [ x ]  Sore throat: yes [  ]   no [ x ]  Rhinorrhea: yes [  ]   no [  ]  Abd pain: yes [  ]   no [x  ]  Nausea: yes [ x ]   no [  ]  Vomiting: yes [  ]   no [ x ]  Diarrhea: yes [ x ]   no [  ]  Loss of sense of smell/anosmia: yes [  ]   no [ x ]  Conjunctivitis: yes [x  ]   no [  ]  Recent travel: yes [  ]   no [ x ] - Location:   Any sick contacts: yes [ x ]   no [  ]- His wife tested positive for COVID 19.   Close contact with someone confirmed positive with COVID-19 / SARS-CoV2 in the last 14 days: yes [x  ]   no [  ]  Code status: Full Code     In the ED, the patient's vital signs were: T: 99.9, HR 95, /77, R: 18, SpO2 97% on RA. Per ED provider note, patient requiring 2L NC after SpO2 88% while ambulating.   CBC WNL, diff sig for lymphocytes 11%. -NLR: 6.64, NEWS2: 3, CMP sig for Na 132, glucose 326. Lactate 1.9. CXR: New bilateral airspace opacities. No pleural effusion. Heart size cannot be accurately assessed in this projection. EKG ordered in ED. He received 650mg PO Tylenol 1 and 1L NS IVF bolus. (31 Mar 2020 16:23)      ----  INTERVAL HPI/OVERNIGHT EVENTS: Pt seen and evaluated at the bedside. No acute overnight events occurred.     ----  PAST MEDICAL & SURGICAL HISTORY:  Hypercholesteremia  Diabetes mellitus  No significant past surgical history      FAMILY HISTORY:  FH: diabetes mellitus      Allergies    No Known Allergies    Intolerances        ----  REVIEW OF SYSTEMS:  CONSTITUTIONAL: denies fever, chills, fatigue, weakness  HEENT: denies blurred vision, sore throat  SKIN: denies new lesions, rash  CARDIOVASCULAR: denies chest pain, chest pressure, palpitations  RESPIRATORY: denies shortness of breath, sputum production  GASTROINTESTINAL: denies nausea, vomiting, diarrhea, abdominal pain  GENITOURINARY: denies dysuria, discharge  NEUROLOGICAL: denies numbness, headache, focal weakness  MUSCULOSKELETAL: denies new joint pain, muscle aches  HEMATOLOGIC: denies gross bleeding, bruising  LYMPHATICS: denies enlarged lymph nodes, extremity swelling  PSYCHIATRIC: denies recent changes in anxiety, depression  ENDOCRINOLOGIC: denies sweating, cold or heat intolerance    ----  PHYSICAL EXAM:  GENERAL: patient appears well, no acute distress, appropriately interactive  EYES: sclera clear, no exudates  ENMT: oropharynx clear without erythema, moist mucous membranes  NECK: supple, soft, no thyromegaly noted  LUNGS: good air entry bilaterally, clear to auscultation, symmetric breath sounds, no wheezing or rhonchi appreciated  HEART: soft S1/S2, regular rate and rhythm, no murmurs noted, no noted edema to b/l LE  GASTROINTESTINAL: abdomen is soft, nontender, nondistended, normoactive bowel sounds, no palpable masses  INTEGUMENT: good skin turgor, appropriate for ethnicity, appears well perfused, no jaundice noted  MUSCULOSKELETAL: no clubbing or cyanosis, no obvious deformity  NEUROLOGIC: awake, alert, oriented x3, good muscle tone in 4 extremities, no obvious sensory deficits  PSYCHIATRIC: mood is good, affect is congruent with mood, linear and logical thought process  HEME/LYMPH: no palpable supraclavicular nodules, no obvious ecchymosis     T(C): 37 (04-01-20 @ 06:33), Max: 39.1 (04-01-20 @ 00:42)  HR: 83 (04-01-20 @ 06:33) (83 - 95)  BP: 132/82 (04-01-20 @ 06:33) (116/70 - 132/82)  RR: 18 (04-01-20 @ 06:33) (18 - 18)  SpO2: 94% (04-01-20 @ 06:33) (94% - 97%)  Wt(kg): --    ----  I&O's Summary      LABS:                        13.5   4.87  )-----------( 233      ( 01 Apr 2020 07:18 )             39.1     04-01    136  |  103  |  12  ----------------------------<  248<H>  4.3   |  28  |  0.80    Ca    8.7      01 Apr 2020 07:18    TPro  7.1  /  Alb  2.4<L>  /  TBili  0.4  /  DBili  x   /  AST  24  /  ALT  17  /  AlkPhos  52  04-01        CAPILLARY BLOOD GLUCOSE      POCT Blood Glucose.: 258 mg/dL (01 Apr 2020 06:38)  POCT Blood Glucose.: 294 mg/dL (31 Mar 2020 23:46)  POCT Blood Glucose.: 255 mg/dL (31 Mar 2020 18:09)                ----  Personally reviewed:  Vital sign trends: [  ] yes    [  ] no     [  ] n/a  Laboratory results: [  ] yes    [  ] no     [  ] n/a  Radiology results: [  ] yes    [  ] no     [  ] n/a  Culture results: [  ] yes    [  ] no     [  ] n/a  Consultant recommendations: [  ] yes    [  ] no     [  ] n/a This progress note was completed in part by resident acting as telephonic scribe during COVID-19 crisis. Physical exam was completed by attending physician, and findings below are those of the attending physician, and have been reviewed in detail.    Patient is a 49y old  Male who presents with a chief complaint of COVID positive outpt, sx onset 3/17, (31 Mar 2020 16:23)      FROM ADMISSION H+P:   HPI:  49 year old Male with PMHx of DM2 and HLD presenting with c/o worsening cough, sob, fevers, and chills which started 2 weeks ago (3/17). Daughter Elvira provided history as patient is [place]ic Speaking. He reports a +COVID test from outpatient ([facility]) facility on Tuesday and received results yesterday. He has sick contact at home as his wife tested positive for COVID 19 as well. He was taking tylenol at home and Azithromycin as well as Ventolin inhaler. Pt did receive his flu vaccine this year. [facility], Patient was prescribed Azithromycin 500mg qd x 5 days (starting 3/30) as well as Z-real (3/21).       The date the pt first felt unwell: 3/17  Fever or chills: yes [x  ]   no [  ]   - Tmax: 103 (3/30)   Fatigue, malaise or generalized weakness: yes [ x ]   no [  ]  Shortness of breath/dyspnea: yes [ x ]   no [  ]  Cough: yes [ x ]   no [  ], sputum production: yes [  ]   no [x  ]  Anorexia/po intolerance: yes [ x ]   no [  ]  Chest pain or chest tightness: yes [ x, with coughing ]   no [  ]  Myalgias: yes [  x]   no [  ]  Headache: yes [  ]   no [ x ]  Sore throat: yes [  ]   no [ x ]  Rhinorrhea: yes [  ]   no [  ]  Abd pain: yes [  ]   no [x  ]  Nausea: yes [ x ]   no [  ]  Vomiting: yes [  ]   no [ x ]  Diarrhea: yes [ x ]   no [  ]  Loss of sense of smell/anosmia: yes [  ]   no [ x ]  Conjunctivitis: yes [x  ]   no [  ]  Recent travel: yes [  ]   no [ x ] - Location:   Any sick contacts: yes [ x ]   no [  ]- His wife tested positive for COVID 19.   Close contact with someone confirmed positive with COVID-19 / SARS-CoV2 in the last 14 days: yes [x  ]   no [  ]  Code status: Full Code     In the ED, the patient's vital signs were: T: 99.9, HR 95, /77, R: 18, SpO2 97% on RA. Per ED provider note, patient requiring 2L NC after SpO2 88% while ambulating.   CBC WNL, diff sig for lymphocytes 11%. -NLR: 6.64, NEWS2: 3, CMP sig for Na 132, glucose 326. Lactate 1.9. CXR: New bilateral airspace opacities. No pleural effusion. Heart size cannot be accurately assessed in this projection. EKG ordered in ED. He received 650mg PO Tylenol 1 and 1L NS IVF bolus. (31 Mar 2020 16:23)      ----  INTERVAL HPI/OVERNIGHT EVENTS: Pt seen and evaluated at the bedside. No acute overnight events occurred.     ----  PAST MEDICAL & SURGICAL HISTORY:  Hypercholesteremia  Diabetes mellitus  No significant past surgical history      FAMILY HISTORY:  FH: diabetes mellitus      Allergies    No Known Allergies    Intolerances        ----  REVIEW OF SYSTEMS:        ----  PHYSICAL EXAM:    T(C): 37 (04-01-20 @ 06:33), Max: 39.1 (04-01-20 @ 00:42)  HR: 83 (04-01-20 @ 06:33) (83 - 95)  BP: 132/82 (04-01-20 @ 06:33) (116/70 - 132/82)  RR: 18 (04-01-20 @ 06:33) (18 - 18)  SpO2: 94% (04-01-20 @ 06:33) (94% - 97%)  Wt(kg): --    ----  I&O's Summary      LABS:                        13.5   4.87  )-----------( 233      ( 01 Apr 2020 07:18 )             39.1     04-01    136  |  103  |  12  ----------------------------<  248<H>  4.3   |  28  |  0.80    Ca    8.7      01 Apr 2020 07:18    TPro  7.1  /  Alb  2.4<L>  /  TBili  0.4  /  DBili  x   /  AST  24  /  ALT  17  /  AlkPhos  52  04-01        CAPILLARY BLOOD GLUCOSE      POCT Blood Glucose.: 258 mg/dL (01 Apr 2020 06:38)  POCT Blood Glucose.: 294 mg/dL (31 Mar 2020 23:46)  POCT Blood Glucose.: 255 mg/dL (31 Mar 2020 18:09)                ----  Personally reviewed:  Vital sign trends: [  ] yes    [  ] no     [  ] n/a  Laboratory results: [  ] yes    [  ] no     [  ] n/a  Radiology results: [  ] yes    [  ] no     [  ] n/a  Culture results: [  ] yes    [  ] no     [  ] n/a  Consultant recommendations: [  ] yes    [  ] no     [  ] n/a

## 2020-04-01 NOTE — PROGRESS NOTE ADULT - PROBLEM SELECTOR PLAN 3
DM2 on Lantus and humalog at home. Family did not know dosing and [facility] not [facility] dose [facility]. Recommend calling Endocrine or PCP. Add Lantus qhs and Humalog depending on accuchecks.   f/u HgA1c  Start low dose insulin sliding scale with accuchecks  Hypogylecmia protocol

## 2020-04-01 NOTE — CONSULT NOTE ADULT - SUBJECTIVE AND OBJECTIVE BOX
Patient is a 49y old  Male who presents with a chief complaint of COVID positive outpt, sx onset 3/17, (01 Apr 2020 12:44)      Reason For Consult: dm2 uncontrolled    HPI:  49 year old Male with PMHx of DM2 and HLD presenting with c/o worsening cough, sob, fevers, and chills which started 2 weeks ago (3/17). Daughter Elvira provided history as patient is [place] Speaking. He reports a +COVID test from outpatient ([facility]) facility on Tuesday and received results yesterday. He has sick contact at home as his wife tested positive for COVID 19 as well. He was taking tylenol at home and Azithromycin as well as Ventolin inhaler. Pt did receive his flu vaccine this year. [facility], Patient was prescribed Azithromycin 500mg qd x 5 days (starting 3/30) as well as Z-real (3/21).       The date the pt first felt unwell: 3/17  Fever or chills: yes [x  ]   no [  ]   - Tmax: 103 (3/30)   Fatigue, malaise or generalized weakness: yes [ x ]   no [  ]  Shortness of breath/dyspnea: yes [ x ]   no [  ]  Cough: yes [ x ]   no [  ], sputum production: yes [  ]   no [x  ]  Anorexia/po intolerance: yes [ x ]   no [  ]  Chest pain or chest tightness: yes [ x, with coughing ]   no [  ]  Myalgias: yes [  x]   no [  ]  Headache: yes [  ]   no [ x ]  Sore throat: yes [  ]   no [ x ]  Rhinorrhea: yes [  ]   no [  ]  Abd pain: yes [  ]   no [x  ]  Nausea: yes [ x ]   no [  ]  Vomiting: yes [  ]   no [ x ]  Diarrhea: yes [ x ]   no [  ]  Loss of sense of smell/anosmia: yes [  ]   no [ x ]  Conjunctivitis: yes [x  ]   no [  ]  Recent travel: yes [  ]   no [ x ] - Location:   Any sick contacts: yes [ x ]   no [  ]- His wife tested positive for COVID 19.   Close contact with someone confirmed positive with COVID-19 / SARS-CoV2 in the last 14 days: yes [x  ]   no [  ]  Code status: Full Code     In the ED, the patient's vital signs were: T: 99.9, HR 95, /77, R: 18, SpO2 97% on RA. Per ED provider note, patient requiring 2L NC after SpO2 88% while ambulating.   CBC WNL, diff sig for lymphocytes 11%. -NLR: 6.64, NEWS2: 3, CMP sig for Na 132, glucose 326. Lactate 1.9. CXR: New bilateral airspace opacities. No pleural effusion. Heart size cannot be accurately assessed in this projection. EKG ordered in ED. He received 650mg PO Tylenol 1 and 1L NS IVF bolus. (31 Mar 2020 16:23)      PAST MEDICAL & SURGICAL HISTORY:  Hypercholesteremia  Diabetes mellitus  No significant past surgical history      FAMILY HISTORY:  FH: diabetes mellitus        Social History:    MEDICATIONS  (STANDING):  dextrose 5%. 1000 milliLiter(s) (50 mL/Hr) IV Continuous <Continuous>  dextrose 50% Injectable 12.5 Gram(s) IV Push once  enoxaparin Injectable 40 milliGRAM(s) SubCutaneous daily  insulin glargine Injectable (LANTUS) 20 Unit(s) SubCutaneous at bedtime  insulin lispro (HumaLOG) corrective regimen sliding scale   SubCutaneous three times a day before meals  omega-3-Acid Ethyl Esters 4 Gram(s) Oral daily    MEDICATIONS  (PRN):  acetaminophen   Tablet .. 650 milliGRAM(s) Oral every 4 hours PRN Temp greater or equal to 38C (100.4F)  ALBUTerol    90 MICROgram(s) HFA Inhaler 2 Puff(s) Inhalation every 4 hours PRN Shortness of Breath and/or Wheezing  benzonatate 100 milliGRAM(s) Oral three times a day PRN Cough  dextrose 40% Gel 15 Gram(s) Oral once PRN Blood Glucose LESS THAN 70 milliGRAM(s)/deciliter  glucagon  Injectable 1 milliGRAM(s) IntraMuscular once PRN Glucose LESS THAN 70 milligrams/deciliter  guaifenesin/dextromethorphan  Syrup 10 milliLiter(s) Oral every 4 hours PRN Cough        T(C): 37.1 (04-01-20 @ 15:29), Max: 39.1 (04-01-20 @ 00:42)  HR: 85 (04-01-20 @ 15:29) (83 - 88)  BP: 119/75 (04-01-20 @ 15:29) (117/74 - 132/82)  RR: 18 (04-01-20 @ 15:29) (18 - 18)  SpO2: 95% (04-01-20 @ 15:29) (94% - 95%)  Wt(kg): --    PHYSICAL EXAM:    CAPILLARY BLOOD GLUCOSE      POCT Blood Glucose.: 304 mg/dL (01 Apr 2020 13:32)  POCT Blood Glucose.: 258 mg/dL (01 Apr 2020 06:38)  POCT Blood Glucose.: 294 mg/dL (31 Mar 2020 23:46)  POCT Blood Glucose.: 255 mg/dL (31 Mar 2020 18:09)                            13.5   4.87  )-----------( 233      ( 01 Apr 2020 07:18 )             39.1       CMP:  04-01 @ 07:18  SGPT 17  Albumin 2.4   Alk Phos 52   Anion Gap 5   SGOT 24   Total Bili 0.4   BUN 12   Calcium Total 8.7   CO2 28   Chloride 103   Creatinine 0.80   eGFR if    eGFR if non    Glucose 248   Potassium 4.3   Protein 7.1   Sodium 136      Thyroid Function Tests:      Diabetes Tests: 04-01 @ 11:28 HbA1c 10.8 C-Peptide --         Radiology:

## 2020-04-01 NOTE — DISCHARGE NOTE PROVIDER - NSDCMRMEDTOKEN_GEN_ALL_CORE_FT
azithromycin 500 mg oral tablet: 1 tab(s) orally once a day  HumaLOG 100 units/mL injectable solution:   Lantus 100 units/mL subcutaneous solution:   Vascepa 1 g oral capsule: 2 cap(s) orally 2 times a day  Ventolin HFA 90 mcg/inh inhalation aerosol: 2 puff(s) inhaled every 6 hours acetaminophen 325 mg oral tablet: 2 tab(s) orally every 4 hours, As needed, Temp greater or equal to 38C (100.4F)  azithromycin 500 mg oral tablet: 1 tab(s) orally once a day  guaifenesin-dextromethorphan 100 mg-10 mg/5 mL oral liquid: 10 milliliter(s) orally every 4 hours, As needed, Cough  HumaLOG 100 units/mL injectable solution:   Lantus 100 units/mL subcutaneous solution: 40 unit(s) subcutaneous once a day (at bedtime)  Vascepa 1 g oral capsule: 2 cap(s) orally 2 times a day  Ventolin HFA 90 mcg/inh inhalation aerosol: 2 puff(s) inhaled every 6 hours

## 2020-04-01 NOTE — DISCHARGE NOTE PROVIDER - NSDCCPCAREPLAN_GEN_ALL_CORE_FT
PRINCIPAL DISCHARGE DIAGNOSIS  Diagnosis: Viral pneumonia  Assessment and Plan of Treatment: you were treated with oxygen and able to wean off not requiring oxygen saturing 95% on room air, please follow up with your primary care provider, rest, hydrate and take tylenol as directed for any fever above 101 and/or persistent body aches/pains

## 2020-04-01 NOTE — DISCHARGE NOTE PROVIDER - PROVIDER TOKENS
FREE:[LAST:[jocelyne],FIRST:[Dr Bubba Acosta],PHONE:[(   )    -],FAX:[(   )    -],ADDRESS:[your primary medical provider],FOLLOWUP:[1 week],ESTABLISHEDPATIENT:[T]]

## 2020-04-01 NOTE — DISCHARGE NOTE PROVIDER - HOSPITAL COURSE
FROM ADMISSION H+P:     HPI:    49 year old Male with PMHx of DM2 and HLD presenting with c/o worsening cough, sob, fevers, and chills which started 2 weeks ago (3/17). Daughter Elvira provided history as patient is Malay Speaking. He reports a +COVID test from outpatient ([facility]) facility on Tuesday and received results yesterday. He has sick contact at home as his wife tested positive for COVID 19 as well. He was taking tylenol at home and Azithromycin as well as Ventolin inhaler. Pt did receive his flu vaccine this year. [facility], Patient was prescribed Azithromycin 500mg qd x 5 days (starting 3/30) as well as Z-real (3/21).             The date the pt first felt unwell: 3/17    Fever or chills: yes [x  ]   no [  ]   - Tmax: 103 (3/30)     Fatigue, malaise or generalized weakness: yes [ x ]   no [  ]    Shortness of breath/dyspnea: yes [ x ]   no [  ]    Cough: yes [ x ]   no [  ], sputum production: yes [  ]   no [x  ]    Anorexia/po intolerance: yes [ x ]   no [  ]    Chest pain or chest tightness: yes [ x, with coughing ]   no [  ]    Myalgias: yes [  x]   no [  ]    Headache: yes [  ]   no [ x ]    Sore throat: yes [  ]   no [ x ]    Rhinorrhea: yes [  ]   no [  ]    Abd pain: yes [  ]   no [x  ]    Nausea: yes [ x ]   no [  ]    Vomiting: yes [  ]   no [ x ]    Diarrhea: yes [ x ]   no [  ]    Loss of sense of smell/anosmia: yes [  ]   no [ x ]    Conjunctivitis: yes [x  ]   no [  ]    Recent travel: yes [  ]   no [ x ] - Location:     Any sick contacts: yes [ x ]   no [  ]- His wife tested positive for COVID 19.     Close contact with someone confirmed positive with COVID-19 / SARS-CoV2 in the last 14 days: yes [x  ]   no [  ]    Code status: Full Code         In the ED, the patient's vital signs were: T: 99.9, HR 95, /77, R: 18, SpO2 97% on RA. Per ED provider note, patient requiring 2L NC after SpO2 88% while ambulating.     CBC WNL, diff sig for lymphocytes 11%. -NLR: 6.64, NEWS2: 3, CMP sig for Na 132, glucose 326. Lactate 1.9. CXR: New bilateral airspace opacities. No pleural effusion. Heart size cannot be accurately assessed in this projection. EKG ordered in ED. He received 650mg PO Tylenol 1 and 1L NS IVF bolus. (31 Mar 2020 16:23)            ---    HOSPITAL COURSE: admitted with acute respiratory failure secondary to COVID19.  Covid19 PCR was ___.  Inflammatory markers were trende.          ---    CONSULTANTS:         ---    TIME SPENT:    The total amount of time spent reviewing the hospital notes, laboratory values, imaging findings, assessing/counseling the patient, discussing with consultant physicians, social work, nursing staff was -- minutes        ---    Primary care provider was made aware of plan for discharge:      [  ] NO     [  ] YES FROM ADMISSION H+P:     HPI:    49 year old Male with PMHx of DM2 and HLD presenting with c/o worsening cough, sob, fevers, and chills which started 2 weeks ago (3/17). Daughter Elvira provided history as patient is Thai Speaking. He reports a +COVID test from outpatient ([facility]) facility on Tuesday and received results yesterday. He has sick contact at home as his wife tested positive for COVID 19 as well. He was taking tylenol at home and Azithromycin as well as Ventolin inhaler. Pt did receive his flu vaccine this year. [facility], Patient was prescribed Azithromycin 500mg qd x 5 days (starting 3/30) as well as Z-real (3/21).             The date the pt first felt unwell: 3/17    Fever or chills: yes [x  ]   no [  ]   - Tmax: 103 (3/30)     Fatigue, malaise or generalized weakness: yes [ x ]   no [  ]    Shortness of breath/dyspnea: yes [ x ]   no [  ]    Cough: yes [ x ]   no [  ], sputum production: yes [  ]   no [x  ]    Anorexia/po intolerance: yes [ x ]   no [  ]    Chest pain or chest tightness: yes [ x, with coughing ]   no [  ]    Myalgias: yes [  x]   no [  ]    Headache: yes [  ]   no [ x ]    Sore throat: yes [  ]   no [ x ]    Rhinorrhea: yes [  ]   no [  ]    Abd pain: yes [  ]   no [x  ]    Nausea: yes [ x ]   no [  ]    Vomiting: yes [  ]   no [ x ]    Diarrhea: yes [ x ]   no [  ]    Loss of sense of smell/anosmia: yes [  ]   no [ x ]    Conjunctivitis: yes [x  ]   no [  ]    Recent travel: yes [  ]   no [ x ] - Location:     Any sick contacts: yes [ x ]   no [  ]- His wife tested positive for COVID 19.     Close contact with someone confirmed positive with COVID-19 / SARS-CoV2 in the last 14 days: yes [x  ]   no [  ]    Code status: Full Code         In the ED, the patient's vital signs were: T: 99.9, HR 95, /77, R: 18, SpO2 97% on RA. Per ED provider note, patient requiring 2L NC after SpO2 88% while ambulating.     CBC WNL, diff sig for lymphocytes 11%. -NLR: 6.64, NEWS2: 3, CMP sig for Na 132, glucose 326. Lactate 1.9. CXR: New bilateral airspace opacities. No pleural effusion. Heart size cannot be accurately assessed in this projection. EKG ordered in ED. He received 650mg PO Tylenol 1 and 1L NS IVF bolus. (31 Mar 2020 16:23)            ---    HOSPITAL COURSE: admitted with acute respiratory failure secondary to COVID19.  Covid19 PCR was pending however pt had outpatient positive test.   Inflammatory markers were trended. Pt improved clinically and was saturating well on room air. Pt advised to continue supportive care at home and follow up with primary medical provider. Pt is stable for discharge.             ---    TIME SPENT: 38 minutes

## 2020-04-02 NOTE — PROGRESS NOTE ADULT - PROBLEM SELECTOR PLAN 4
Continue home Vascepa 2g bid (patient was prescribed this per pharmacy at the beginning of March). Therapeutic interchange with Sharon 3 while in hospital.

## 2020-04-02 NOTE — PROGRESS NOTE ADULT - SUBJECTIVE AND OBJECTIVE BOX
*************CHARTING IN PROGRESS*****************  Patient is a 49y old  Male who presents with a chief complaint of COVID positive outpt, sx onset 3/17, (01 Apr 2020 17:14)      INTERVAL HPI/OVERNIGHT EVENTS: Patient seen and examined at bedside.    MEDICATIONS  (STANDING):  dextrose 5%. 1000 milliLiter(s) (50 mL/Hr) IV Continuous <Continuous>  dextrose 50% Injectable 12.5 Gram(s) IV Push once  enoxaparin Injectable 40 milliGRAM(s) SubCutaneous daily  insulin glargine Injectable (LANTUS) 20 Unit(s) SubCutaneous at bedtime  insulin lispro (HumaLOG) corrective regimen sliding scale   SubCutaneous three times a day before meals  omega-3-Acid Ethyl Esters 4 Gram(s) Oral daily    MEDICATIONS  (PRN):  acetaminophen   Tablet .. 650 milliGRAM(s) Oral every 4 hours PRN Temp greater or equal to 38C (100.4F)  ALBUTerol    90 MICROgram(s) HFA Inhaler 2 Puff(s) Inhalation every 4 hours PRN Shortness of Breath and/or Wheezing  benzonatate 100 milliGRAM(s) Oral three times a day PRN Cough  dextrose 40% Gel 15 Gram(s) Oral once PRN Blood Glucose LESS THAN 70 milliGRAM(s)/deciliter  glucagon  Injectable 1 milliGRAM(s) IntraMuscular once PRN Glucose LESS THAN 70 milligrams/deciliter  guaifenesin/dextromethorphan  Syrup 10 milliLiter(s) Oral every 4 hours PRN Cough      Allergies    No Known Allergies    Intolerances        REVIEW OF SYSTEMS:  CONSTITUTIONAL: No fever or chills  HEENT: No headache, no sore throat  RESPIRATORY: No cough, wheezing, or shortness of breath  CARDIOVASCULAR: No chest pain, palpitations, or leg swelling  GASTROINTESTINAL: No abd pain, nausea, vomiting, or diarrhea  GENITOURINARY: No dysuria, frequency, or hematuria  NEUROLOGICAL: No focal weakness or dizziness  MUSCULOSKELETAL: No myalgias     Vital Signs Last 24 Hrs  T(C): 37.2 (01 Apr 2020 17:42), Max: 37.2 (01 Apr 2020 17:42)  T(F): 99 (01 Apr 2020 17:42), Max: 99 (01 Apr 2020 17:42)  HR: 86 (01 Apr 2020 17:42) (85 - 86)  BP: 132/64 (01 Apr 2020 17:42) (119/75 - 132/64)  BP(mean): --  RR: 18 (01 Apr 2020 17:42) (18 - 18)  SpO2: 95% (01 Apr 2020 17:42) (95% - 95%)    PHYSICAL EXAM:  GENERAL: NAD  HEENT: EOMI, moist mucous membranes  CHEST/LUNG: CTA b/l, no rales, wheezes, or rhonchi  HEART: RRR, S1, S2  ABDOMEN: BS+, soft, nontender, nondistended  EXTREMITIES: No edema, cyanosis, or calf tenderness  NERVOUS SYSTEM: AA&Ox3    LABS:    CBC Full  -  ( 01 Apr 2020 07:18 )  WBC Count : 4.87 K/uL  Hemoglobin : 13.5 g/dL  Hematocrit : 39.1 %  Platelet Count - Automated : 233 K/uL  Mean Cell Volume : 83.9 fl  Mean Cell Hemoglobin : 29.0 pg  Mean Cell Hemoglobin Concentration : 34.5 gm/dL  Auto Neutrophil # : 2.67 K/uL  Auto Lymphocyte # : 1.66 K/uL  Auto Monocyte # : 0.45 K/uL  Auto Eosinophil # : 0.04 K/uL  Auto Basophil # : 0.02 K/uL  Auto Neutrophil % : 54.9 %  Auto Lymphocyte % : 34.1 %  Auto Monocyte % : 9.2 %  Auto Eosinophil % : 0.8 %  Auto Basophil % : 0.4 %      Ca    8.7        01 Apr 2020 07:18          CAPILLARY BLOOD GLUCOSE      POCT Blood Glucose.: 222 mg/dL (01 Apr 2020 17:32)  POCT Blood Glucose.: 304 mg/dL (01 Apr 2020 13:32)        Culture - Blood (collected 03-31-20 @ 17:11)  Source: .Blood Blood  Preliminary Report (04-01-20 @ 18:01):    No growth to date.    Culture - Blood (collected 03-31-20 @ 17:11)  Source: .Blood Blood  Preliminary Report (04-01-20 @ 18:01):    No growth to date.        RADIOLOGY & ADDITIONAL TESTS:    Personally reviewed.     Consultant(s) Notes Reviewed:  [x] YES  [ ] NO

## 2020-04-04 LAB
CULTURE RESULTS: SIGNIFICANT CHANGE UP
CULTURE RESULTS: SIGNIFICANT CHANGE UP
SPECIMEN SOURCE: SIGNIFICANT CHANGE UP
SPECIMEN SOURCE: SIGNIFICANT CHANGE UP

## 2020-09-02 ENCOUNTER — TRANSCRIPTION ENCOUNTER (OUTPATIENT)
Age: 50
End: 2020-09-02

## 2020-09-12 ENCOUNTER — TRANSCRIPTION ENCOUNTER (OUTPATIENT)
Age: 50
End: 2020-09-12

## 2021-06-24 NOTE — ED ADULT NURSE NOTE - NS ED NURSE LEVEL OF CONSCIOUSNESS AFFECT
New Patient: CERVICAL SPINE    Referring Provider:  Mark Choe MD    CHIEF COMPLAINT:    Chief Complaint   Patient presents with    Neck Pain     Patient states that he passed out and fell on his front porch 22 days ago. Patient complains of neck and right shoulder and arm pain. HISTORY OF PRESENT ILLNESS:   Mr. Lyla Chino is a pleasant 61 y.o. old male here for consultation regarding his neck and right arm pain. He states the pain began about 3 weeks ago. He had a syncopal event and fell forward on his porch, hitting the left side of his face on a post and silke bush. His pain has steadily increased since then. He is uncertain of the cause of his syncopal event, but notes he did have his 2nd covid injection the day prior. He rates his neck pain 8/10 and shoulder and arm pain 8/10. He describes the pain as aching. Pain is constant but can be aggravated with cervical rotation of his neck. He notes pain in his right neck, that radiates into his right interscauplar area and into his right shoulder. He notes intermittent numbness and tingling down his right arm in a C5 distribution. He notes weakness of his right shoulder and arm. He notes new difficulty with fine motor tasks of his right hand. He denies balance disruption, lower extremity symptoms, gait instability, saddle numbness, or bowel or bladder dysfunction. He is taking Robaxin with some relief. He has seen Dr. Lucrecia Choi for evaluation of right shoulder and has MRI right shoulder pending for probable RTC tear.      Current/Past Treatment:   · Physical Therapy: Yes, 1 session   · Chiropractic:  No  · Injection:  No   · Medications: Robaxin      Past Medical History:   Past Medical History:   Diagnosis Date    GERD (gastroesophageal reflux disease)     Hypertension         Past Surgical History:     Past Surgical History:   Procedure Laterality Date    ABDOMEN SURGERY      liver lesion drained    COLONOSCOPY  6/2/16     with biopsy heat/cold  RESPIRATORY: Denies current dyspnea, cough  GI: Denies nausea, vomiting, diarrhea   : Denies bowel or bladder issues       PHYSICAL EXAM:    Vitals: Height 6' 1\" (1.854 m), weight 221 lb (100.2 kg). GENERAL EXAM:  · General Apparence: Patient is adequately groomed with no evidence of malnutrition. · Orientation: The patient is oriented to time, place and person. · Mood & Affect:The patient's mood and affect are appropriate   · Vascular: Examination reveals no swelling tenderness in upper or lower extremities. Good capillary refill  · Lymphatic: The lymphatic examination bilaterally reveals all areas to be without enlargement or induration  · Sensation: Sensation is intact without deficit  · Coordination/Balance: Good coordination. Tandem walking normal.     CERVICAL EXAMINATION:  · Inspection: Local inspection shows no step-off or bruising. Cervical alignment is normal.     · Palpation: No evidence of tenderness at the midline, and trapezius. Paraspinal tenderness is present. There is no step-off or paraspinal spasm. · Range of Motion: Cervical flexion, extension, and rotation are mildly reduced with pain. · Strength: He has 3/5 right deltoid and internal shoulder rotators, 2/3 right shoulder external rotators, 4-/5 right biceps and triceps, 5/5 right wrist flexion and extension,  strength and finger intrinsics. 5/5 left upper extremity motor strength throughout. · Special Tests:    ·  Spurling's negative & Harris's negative bilaterally. ·  Cubital and Carpal tunnel Tinel's negative bilaterally. · Skin:There are no rashes, ulcerations or lesions in right & left upper extremities. · Reflexes: Bilaterally triceps, biceps and brachioradialis are 2+. Clonus absent bilaterally at the feet.    · Gait & station: normal, patient ambulates without assistance       · Additional Examinations:       · RIGHT UPPER EXTREMITY:  Inspection/examination of the right upper extremity does not show any tenderness, deformity or injury. Range of motion is unremarkable. There is no gross instability. There are no rashes, ulcerations or lesions. Strength and tone are normal.  · LEFT UPPER EXTREMITY: Inspection/examination of the left upper extremity does not show any tenderness, deformity or injury. Range of motion is unremarkable. There is no gross instability. There are no rashes, ulcerations or lesions. Strength and tone are normal.    Diagnostic Testing:  I reviewed CT images of his cervical spine from 6/2/21. They show advanced multilevel degenerative disc changes throughout the cervical spine, most notable C5-C7. No obvious acute fracture noted. Multilevel foraminal stenosis noted and probable central stenosis C6-7    Impression:   Cervical stenosis with significant right shoulder and arm weakness after fall    Plan:    I recommend he proceed with an MRI of his cervical spine. He will also try Gabapentin. He was provided instructions on Gabapentin use and was advised to stop the medication immediately if it causes significant drowsiness or other side effects. I did advise he discuss with pharmacist and PCP, or other physician working up most recent syncopal event, that he can start the Gabapentin with recent syncope.      Ruth Barthel, PA-C [facility]

## 2021-08-09 ENCOUNTER — TRANSCRIPTION ENCOUNTER (OUTPATIENT)
Age: 51
End: 2021-08-09

## 2022-02-09 ENCOUNTER — EMERGENCY (EMERGENCY)
Facility: HOSPITAL | Age: 52
LOS: 1 days | Discharge: HOME | End: 2022-02-09
Attending: EMERGENCY MEDICINE | Admitting: INTERNAL MEDICINE
Payer: COMMERCIAL

## 2022-02-09 VITALS
DIASTOLIC BLOOD PRESSURE: 74 MMHG | HEART RATE: 128 BPM | OXYGEN SATURATION: 98 % | SYSTOLIC BLOOD PRESSURE: 116 MMHG | WEIGHT: 145.06 LBS | TEMPERATURE: 103 F | HEIGHT: 64 IN | RESPIRATION RATE: 16 BRPM

## 2022-02-09 LAB
ALBUMIN SERPL ELPH-MCNC: 3.2 G/DL — LOW (ref 3.3–5)
ALP SERPL-CCNC: 76 U/L — SIGNIFICANT CHANGE UP (ref 40–120)
ALT FLD-CCNC: 29 U/L — SIGNIFICANT CHANGE UP (ref 12–78)
ANION GAP SERPL CALC-SCNC: 7 MMOL/L — SIGNIFICANT CHANGE UP (ref 5–17)
AST SERPL-CCNC: 19 U/L — SIGNIFICANT CHANGE UP (ref 15–37)
BASOPHILS # BLD AUTO: 0.04 K/UL — SIGNIFICANT CHANGE UP (ref 0–0.2)
BASOPHILS NFR BLD AUTO: 0.3 % — SIGNIFICANT CHANGE UP (ref 0–2)
BILIRUB SERPL-MCNC: 0.4 MG/DL — SIGNIFICANT CHANGE UP (ref 0.2–1.2)
BUN SERPL-MCNC: 26 MG/DL — HIGH (ref 7–23)
CALCIUM SERPL-MCNC: 8.6 MG/DL — SIGNIFICANT CHANGE UP (ref 8.5–10.1)
CHLORIDE SERPL-SCNC: 103 MMOL/L — SIGNIFICANT CHANGE UP (ref 96–108)
CO2 SERPL-SCNC: 26 MMOL/L — SIGNIFICANT CHANGE UP (ref 22–31)
CREAT SERPL-MCNC: 1.1 MG/DL — SIGNIFICANT CHANGE UP (ref 0.5–1.3)
EOSINOPHIL # BLD AUTO: 0.3 K/UL — SIGNIFICANT CHANGE UP (ref 0–0.5)
EOSINOPHIL NFR BLD AUTO: 2.1 % — SIGNIFICANT CHANGE UP (ref 0–6)
GLUCOSE SERPL-MCNC: 238 MG/DL — HIGH (ref 70–99)
HCT VFR BLD CALC: 41.9 % — SIGNIFICANT CHANGE UP (ref 39–50)
HGB BLD-MCNC: 14.7 G/DL — SIGNIFICANT CHANGE UP (ref 13–17)
IMM GRANULOCYTES NFR BLD AUTO: 0.4 % — SIGNIFICANT CHANGE UP (ref 0–1.5)
LYMPHOCYTES # BLD AUTO: 1.61 K/UL — SIGNIFICANT CHANGE UP (ref 1–3.3)
LYMPHOCYTES # BLD AUTO: 11.4 % — LOW (ref 13–44)
MCHC RBC-ENTMCNC: 29.1 PG — SIGNIFICANT CHANGE UP (ref 27–34)
MCHC RBC-ENTMCNC: 35.1 GM/DL — SIGNIFICANT CHANGE UP (ref 32–36)
MCV RBC AUTO: 82.8 FL — SIGNIFICANT CHANGE UP (ref 80–100)
MONOCYTES # BLD AUTO: 0.71 K/UL — SIGNIFICANT CHANGE UP (ref 0–0.9)
MONOCYTES NFR BLD AUTO: 5 % — SIGNIFICANT CHANGE UP (ref 2–14)
NEUTROPHILS # BLD AUTO: 11.36 K/UL — HIGH (ref 1.8–7.4)
NEUTROPHILS NFR BLD AUTO: 80.8 % — HIGH (ref 43–77)
NRBC # BLD: 0 /100 WBCS — SIGNIFICANT CHANGE UP (ref 0–0)
PLATELET # BLD AUTO: 205 K/UL — SIGNIFICANT CHANGE UP (ref 150–400)
POTASSIUM SERPL-MCNC: 4.3 MMOL/L — SIGNIFICANT CHANGE UP (ref 3.5–5.3)
POTASSIUM SERPL-SCNC: 4.3 MMOL/L — SIGNIFICANT CHANGE UP (ref 3.5–5.3)
PROT SERPL-MCNC: 7.2 G/DL — SIGNIFICANT CHANGE UP (ref 6–8.3)
RBC # BLD: 5.06 M/UL — SIGNIFICANT CHANGE UP (ref 4.2–5.8)
RBC # FLD: 12.2 % — SIGNIFICANT CHANGE UP (ref 10.3–14.5)
SODIUM SERPL-SCNC: 136 MMOL/L — SIGNIFICANT CHANGE UP (ref 135–145)
WBC # BLD: 14.07 K/UL — HIGH (ref 3.8–10.5)
WBC # FLD AUTO: 14.07 K/UL — HIGH (ref 3.8–10.5)

## 2022-02-09 PROCEDURE — 71250 CT THORAX DX C-: CPT | Mod: 26,MA

## 2022-02-09 PROCEDURE — 71045 X-RAY EXAM CHEST 1 VIEW: CPT | Mod: 26

## 2022-02-09 PROCEDURE — 99285 EMERGENCY DEPT VISIT HI MDM: CPT

## 2022-02-09 PROCEDURE — 93010 ELECTROCARDIOGRAM REPORT: CPT

## 2022-02-09 RX ORDER — ACETAMINOPHEN 500 MG
650 TABLET ORAL ONCE
Refills: 0 | Status: COMPLETED | OUTPATIENT
Start: 2022-02-09 | End: 2022-02-09

## 2022-02-09 RX ORDER — SODIUM CHLORIDE 9 MG/ML
1000 INJECTION INTRAMUSCULAR; INTRAVENOUS; SUBCUTANEOUS ONCE
Refills: 0 | Status: COMPLETED | OUTPATIENT
Start: 2022-02-09 | End: 2022-02-09

## 2022-02-09 RX ORDER — KETOROLAC TROMETHAMINE 30 MG/ML
30 SYRINGE (ML) INJECTION ONCE
Refills: 0 | Status: DISCONTINUED | OUTPATIENT
Start: 2022-02-09 | End: 2022-02-09

## 2022-02-09 RX ADMIN — SODIUM CHLORIDE 1000 MILLILITER(S): 9 INJECTION INTRAMUSCULAR; INTRAVENOUS; SUBCUTANEOUS at 22:28

## 2022-02-09 RX ADMIN — SODIUM CHLORIDE 1000 MILLILITER(S): 9 INJECTION INTRAMUSCULAR; INTRAVENOUS; SUBCUTANEOUS at 23:21

## 2022-02-09 RX ADMIN — Medication 30 MILLIGRAM(S): at 22:28

## 2022-02-09 RX ADMIN — Medication 650 MILLIGRAM(S): at 22:29

## 2022-02-09 RX ADMIN — SODIUM CHLORIDE 1000 MILLILITER(S): 9 INJECTION INTRAMUSCULAR; INTRAVENOUS; SUBCUTANEOUS at 23:24

## 2022-02-09 NOTE — ED PROVIDER NOTE - PROVIDER TOKENS
PROVIDER:[TOKEN:[[id number]],FOLLOWUP:[1-3 Days]] PROVIDER:[TOKEN:[[id number]],FOLLOWUP:[1-3 Days]],PROVIDER:[TOKEN:[[id number]],FOLLOWUP:[1-3 Days]]

## 2022-02-09 NOTE — ED ADULT NURSE NOTE - OBJECTIVE STATEMENT
Patient came in to ED accompanied by daughter c/o cough with chest discomfort, body aches, on and off fever for the past few days. Patient states took tylenol @ 6pm tonight. Patient fully vaccinated against COvid with Moderna.

## 2022-02-09 NOTE — ED ADULT NURSE NOTE - SKIN INTEGRITY
Home Health SOC 02/01/2019 - 04/01/2019 with [facility] - Dr. Avila Yung. Patient received SN, PT and OT services.  
intact

## 2022-02-09 NOTE — ED ADULT TRIAGE NOTE - CHIEF COMPLAINT QUOTE
Patient complaining of not feeling well for a week with back pain, cough, body ache took tylenol 6pm Temp in .5 covid vaccinated x2 and boostered

## 2022-02-09 NOTE — ED PROVIDER NOTE - OBJECTIVE STATEMENT
52 y/o M with c/o fevers, chills, cough, back pain x 1 week.  pt took a home covid test which was neg.

## 2022-02-09 NOTE — ED PROVIDER NOTE - CARE PROVIDERS DIRECT ADDRESSES
,DirectAddress_Unknown ,DirectAddress_Unknown,[email or web address]

## 2022-02-09 NOTE — ED PROVIDER NOTE - CARE PROVIDER_API CALL
Acute heart failure exacerbation presenting with shortness of breath x several days Reynaldo Murcia  INTERNAL MEDICINE  [facility], [address]  [place] [address]  Phone: [phone]  Fax: [phone]  Follow Up Time: 1-3 Days   Reynaldo Murcia  INTERNAL MEDICINE  [address], [address]  [place] [address]  Phone: [phone]  Fax: [phone]  Follow Up Time: 1-3 Days    Ayo Pinedo; PhD)  Infectious Disease; Internal Medicine  [address] [place] [id number]  Phone: [phone]  Fax: [phone]  Follow Up Time: 1-3 Days

## 2022-02-09 NOTE — ED PROVIDER NOTE - PATIENT PORTAL LINK FT
You can access the FollowMyHealth Patient Portal offered by [facility] by registering at the following website: [email or web address]. By joining [employer]’s FollowMyHealth portal, you will also be able to view your health information using other applications (apps) compatible with our system.

## 2022-02-09 NOTE — ED PROVIDER NOTE - NSFOLLOWUPINSTRUCTIONS_ED_ALL_ED_FT
Community-Acquired Pneumonia, Adult      Pneumonia is a lung infection that causes inflammation and the buildup of mucus and fluids in the lungs. This may cause coughing and difficulty breathing. Community-acquired pneumonia is pneumonia that develops in people who are not, and have not recently been, in a hospital or other health care facility.    Usually, pneumonia develops as a result of an illness that is caused by a virus, such as the common cold and the flu (influenza). It can also be caused by bacteria or fungi. While the common cold and influenza can pass from person to person (are contagious), pneumonia itself is not considered contagious.      What are the causes?     This condition may be caused by:  •Viruses.      •Bacteria.      •Fungi, such as molds or mushrooms.        What increases the risk?    The following factors may make you more likely to develop this condition:•Having certain medical conditions, such as:  •A long-term (chronic) disease, which may include chronic obstructive pulmonary disease (COPD), asthma, heart failure, cystic fibrosis, diabetes, kidney disease, sickle cell disease, and human immunodeficiency virus (HIV).      •A condition that increases the risk of breathing in (aspirating) mucus and other fluids from your mouth and nose.      •A weakened body defense system (immune system).        •Having had your spleen removed (splenectomy). The spleen is the organ that helps fight germs and infections.      •Not cleaning your teeth and gums well (poor dental hygiene).      •Using tobacco products.      •Traveling to places where germs that cause pneumonia are present.      •Being near certain animals, or animal habitats, that have germs that cause pneumonia.      •Being older than 65 years of age.        What are the signs or symptoms?    Symptoms of this condition include:  •A dry cough or a wet (productive) cough.      •A fever.      •Sweating or chills.      •Chest pain, especially when breathing deeply or coughing.      •Fast breathing, difficulty breathing, or shortness of breath.      •Tiredness (fatigue).      •Muscle aches.        How is this diagnosed?     This condition may be diagnosed based on your medical history or a physical exam. You may also have tests, including:  •Chest X-rays.      •Tests of the level of oxygen and other gases in your blood.    •Tests of:  •Your blood.      •Mucus from your lungs (sputum).      •Fluid around your lungs (pleural fluid).      •Your urine.        If your pneumonia is severe, other tests may be done to learn more about the cause.      How is this treated?    Treatment for this condition depends on many factors, such as the cause of your pneumonia, your medicines, and other medical conditions that you have.    For most adults, pneumonia may be treated at home. In some cases, treatment must happen in a hospital and may include:•Medicines that are given by mouth (orally) or through an IV, including:  •Antibiotic medicines, if bacteria caused the pneumonia.      •Medicines that kill viruses (antiviral medicines), if a virus caused the pneumonia.        •Oxygen therapy.      Severe pneumonia, although rare, may require the following treatments:  •Mechanical ventilation.This procedure uses a machine to help you breathe if you cannot breathe well on your own or maintain a safe level of blood oxygen.      •Thoracentesis. This procedure removes any buildup of pleural fluid to help with breathing.        Follow these instructions at home:     Medicines     •Take over-the-counter and prescription medicines only as told by your health care provider.      •Take cough medicine only if you have trouble sleeping. Cough medicine can prevent your body from removing mucus from your lungs.      •If you were prescribed an antibiotic medicine, take it as told by your health care provider. Do not stop taking the antibiotic even if you start to feel better.        Lifestyle                   • Do not drink alcohol.      • Do not use any products that contain nicotine or tobacco, such as cigarettes, e-cigarettes, and chewing tobacco. If you need help quitting, ask your health care provider.      •Eat a healthy diet. This includes plenty of vegetables, fruits, whole grains, low-fat dairy products, and lean protein.      General instructions     •Rest a lot and get at least 8 hours of sleep each night.      •Sleep in a partly upright position at night. Place a few pillows under your head or sleep in a reclining chair.      •Return to your normal activities as told by your health care provider. Ask your health care provider what activities are safe for you.      •Drink enough fluid to keep your urine pale yellow. This helps to thin the mucus in your lungs.      •If your throat is sore, gargle with a salt–water mixture 3–4 times a day or as needed. To make a salt–water mixture, completely dissolve ½–1 tsp (3–6 g) of salt in 1 cup (237 mL) of warm water.      •Keep all follow-up visits as told by your health care provider. This is important.        How is this prevented?    You can lower your risk of developing community-acquired pneumonia by:•Getting the pneumonia vaccine. There are different types and schedules of pneumonia vaccines. Ask your health care provider which option is best for you. Consider getting the pneumonia vaccine if:  •You are older than 65 years of age.      •You are 19–65 years of age and are receiving cancer treatment, have chronic lung disease, or have other medical conditions that affect your immune system. Ask your health care provider if this applies to you.        •Getting your influenza vaccine every year. Ask your health care provider which type of vaccine is best for you.      •Getting regular dental checkups.      •Washing your hands often with soap and water for at least 20 seconds. If soap and water are not available, use hand .        Contact a health care provider if you have:    •A fever.      •Trouble sleeping because you cannot control your cough with cough medicine.        Get help right away if:    •Your shortness of breath becomes worse.      •Your chest pain increases.      •Your sickness becomes worse, especially if you are an older adult or have a weak immune system.      •You cough up blood.      These symptoms may represent a serious problem that is an emergency. Do not wait to see if the symptoms will go away. Get medical help right away. Call your local emergency services (911 in the [place] Do not drive yourself to the hospital.       Summary    •Pneumonia is an infection of the lungs.      •Community-acquired pneumonia develops in people who have not been in the hospital. It can be caused by bacteria, viruses, or fungi.      •This condition may be treated with antibiotics or antiviral medicines.      •Severe pneumonia may require a hospital stay and treatment to help with breathing.      This information is not intended to replace advice given to you by your health care provider. Make sure you discuss any questions you have with your health care provider.

## 2022-02-10 VITALS
OXYGEN SATURATION: 98 % | HEART RATE: 87 BPM | SYSTOLIC BLOOD PRESSURE: 124 MMHG | TEMPERATURE: 99 F | DIASTOLIC BLOOD PRESSURE: 67 MMHG | RESPIRATION RATE: 18 BRPM

## 2022-02-10 LAB
FLUAV H1 2009 PAND RNA SPEC QL NAA+PROBE: DETECTED
LACTATE SERPL-SCNC: 2.1 MMOL/L — HIGH (ref 0.7–2)
RAPID RVP RESULT: DETECTED
SARS-COV-2 RNA SPEC QL NAA+PROBE: SIGNIFICANT CHANGE UP

## 2022-02-10 PROCEDURE — 71250 CT THORAX DX C-: CPT | Mod: MA

## 2022-02-10 PROCEDURE — 99285 EMERGENCY DEPT VISIT HI MDM: CPT | Mod: 25

## 2022-02-10 PROCEDURE — 0225U NFCT DS DNA&RNA 21 SARSCOV2: CPT

## 2022-02-10 PROCEDURE — 93005 ELECTROCARDIOGRAM TRACING: CPT

## 2022-02-10 PROCEDURE — 96361 HYDRATE IV INFUSION ADD-ON: CPT

## 2022-02-10 PROCEDURE — 83605 ASSAY OF LACTIC ACID: CPT

## 2022-02-10 PROCEDURE — 36415 COLL VENOUS BLD VENIPUNCTURE: CPT

## 2022-02-10 PROCEDURE — 85025 COMPLETE CBC W/AUTO DIFF WBC: CPT

## 2022-02-10 PROCEDURE — 71045 X-RAY EXAM CHEST 1 VIEW: CPT

## 2022-02-10 PROCEDURE — 96375 TX/PRO/DX INJ NEW DRUG ADDON: CPT

## 2022-02-10 PROCEDURE — 80053 COMPREHEN METABOLIC PANEL: CPT

## 2022-02-10 PROCEDURE — 96368 THER/DIAG CONCURRENT INF: CPT

## 2022-02-10 PROCEDURE — 96365 THER/PROPH/DIAG IV INF INIT: CPT

## 2022-02-10 PROCEDURE — 87040 BLOOD CULTURE FOR BACTERIA: CPT

## 2022-02-10 RX ORDER — CEFTRIAXONE 500 MG/1
1000 INJECTION, POWDER, FOR SOLUTION INTRAMUSCULAR; INTRAVENOUS ONCE
Refills: 0 | Status: COMPLETED | OUTPATIENT
Start: 2022-02-10 | End: 2022-02-10

## 2022-02-10 RX ORDER — AZITHROMYCIN 500 MG/1
1 TABLET, FILM COATED ORAL
Qty: 1 | Refills: 0
Start: 2022-02-10 | End: 2022-02-14

## 2022-02-10 RX ORDER — AZITHROMYCIN 500 MG/1
500 TABLET, FILM COATED ORAL ONCE
Refills: 0 | Status: COMPLETED | OUTPATIENT
Start: 2022-02-10 | End: 2022-02-10

## 2022-02-10 RX ORDER — CEFUROXIME AXETIL 250 MG
1 TABLET ORAL
Qty: 20 | Refills: 0
Start: 2022-02-10 | End: 2022-02-19

## 2022-02-10 RX ADMIN — Medication 650 MILLIGRAM(S): at 01:00

## 2022-02-10 RX ADMIN — CEFTRIAXONE 1000 MILLIGRAM(S): 500 INJECTION, POWDER, FOR SOLUTION INTRAMUSCULAR; INTRAVENOUS at 03:18

## 2022-02-10 RX ADMIN — Medication 30 MILLIGRAM(S): at 01:00

## 2022-02-10 RX ADMIN — AZITHROMYCIN 255 MILLIGRAM(S): 500 TABLET, FILM COATED ORAL at 02:35

## 2022-02-10 RX ADMIN — Medication 75 MILLIGRAM(S): at 01:33

## 2022-02-10 RX ADMIN — SODIUM CHLORIDE 1000 MILLILITER(S): 9 INJECTION INTRAMUSCULAR; INTRAVENOUS; SUBCUTANEOUS at 00:32

## 2022-02-10 RX ADMIN — CEFTRIAXONE 100 MILLIGRAM(S): 500 INJECTION, POWDER, FOR SOLUTION INTRAMUSCULAR; INTRAVENOUS at 02:05

## 2022-02-10 RX ADMIN — AZITHROMYCIN 500 MILLIGRAM(S): 500 TABLET, FILM COATED ORAL at 03:38

## 2022-02-14 ENCOUNTER — OUTPATIENT (OUTPATIENT)
Dept: OUTPATIENT SERVICES | Facility: HOSPITAL | Age: 52
LOS: 1 days | End: 2022-02-14
Payer: COMMERCIAL

## 2022-02-14 ENCOUNTER — APPOINTMENT (OUTPATIENT)
Dept: RADIOLOGY | Facility: CLINIC | Age: 52
End: 2022-02-14
Payer: COMMERCIAL

## 2022-02-14 DIAGNOSIS — Z00.8 ENCOUNTER FOR OTHER GENERAL EXAMINATION: ICD-10-CM

## 2022-02-14 PROCEDURE — 71046 X-RAY EXAM CHEST 2 VIEWS: CPT | Mod: 26

## 2022-02-14 PROCEDURE — 71046 X-RAY EXAM CHEST 2 VIEWS: CPT

## 2022-05-20 ENCOUNTER — EMERGENCY (EMERGENCY)
Facility: HOSPITAL | Age: 52
LOS: 1 days | Discharge: HOME | End: 2022-05-20
Attending: EMERGENCY MEDICINE | Admitting: EMERGENCY MEDICINE
Payer: MEDICAID

## 2022-05-20 VITALS
OXYGEN SATURATION: 100 % | SYSTOLIC BLOOD PRESSURE: 140 MMHG | RESPIRATION RATE: 15 BRPM | TEMPERATURE: 99 F | DIASTOLIC BLOOD PRESSURE: 79 MMHG | HEART RATE: 77 BPM

## 2022-05-20 VITALS
SYSTOLIC BLOOD PRESSURE: 119 MMHG | DIASTOLIC BLOOD PRESSURE: 75 MMHG | HEART RATE: 81 BPM | HEIGHT: 64 IN | WEIGHT: 145.06 LBS | TEMPERATURE: 98 F | OXYGEN SATURATION: 99 % | RESPIRATION RATE: 16 BRPM

## 2022-05-20 DIAGNOSIS — E11.621 TYPE 2 DIABETES MELLITUS WITH FOOT ULCER: ICD-10-CM

## 2022-05-20 LAB
ALBUMIN SERPL ELPH-MCNC: 3.4 G/DL — SIGNIFICANT CHANGE UP (ref 3.3–5)
ALP SERPL-CCNC: 77 U/L — SIGNIFICANT CHANGE UP (ref 40–120)
ALT FLD-CCNC: 22 U/L — SIGNIFICANT CHANGE UP (ref 12–78)
ANION GAP SERPL CALC-SCNC: 5 MMOL/L — SIGNIFICANT CHANGE UP (ref 5–17)
APTT BLD: 33 SEC — SIGNIFICANT CHANGE UP (ref 27.5–35.5)
AST SERPL-CCNC: 12 U/L — LOW (ref 15–37)
BASOPHILS # BLD AUTO: 0.03 K/UL — SIGNIFICANT CHANGE UP (ref 0–0.2)
BASOPHILS NFR BLD AUTO: 0.4 % — SIGNIFICANT CHANGE UP (ref 0–2)
BILIRUB SERPL-MCNC: 0.2 MG/DL — SIGNIFICANT CHANGE UP (ref 0.2–1.2)
BUN SERPL-MCNC: 20 MG/DL — SIGNIFICANT CHANGE UP (ref 7–23)
CALCIUM SERPL-MCNC: 9.2 MG/DL — SIGNIFICANT CHANGE UP (ref 8.5–10.1)
CHLORIDE SERPL-SCNC: 108 MMOL/L — SIGNIFICANT CHANGE UP (ref 96–108)
CO2 SERPL-SCNC: 31 MMOL/L — SIGNIFICANT CHANGE UP (ref 22–31)
CREAT SERPL-MCNC: 0.97 MG/DL — SIGNIFICANT CHANGE UP (ref 0.5–1.3)
CRP SERPL-MCNC: 6 MG/L — HIGH
EGFR: 96 ML/MIN/1.73M2 — SIGNIFICANT CHANGE UP
EOSINOPHIL # BLD AUTO: 0.24 K/UL — SIGNIFICANT CHANGE UP (ref 0–0.5)
EOSINOPHIL NFR BLD AUTO: 3.2 % — SIGNIFICANT CHANGE UP (ref 0–6)
ERYTHROCYTE [SEDIMENTATION RATE] IN BLOOD: 14 MM/HR — SIGNIFICANT CHANGE UP (ref 0–15)
GLUCOSE SERPL-MCNC: 39 MG/DL — CRITICAL LOW (ref 70–99)
HCT VFR BLD CALC: 41.6 % — SIGNIFICANT CHANGE UP (ref 39–50)
HGB BLD-MCNC: 13.9 G/DL — SIGNIFICANT CHANGE UP (ref 13–17)
IMM GRANULOCYTES NFR BLD AUTO: 0.4 % — SIGNIFICANT CHANGE UP (ref 0–1.5)
INR BLD: 0.91 RATIO — SIGNIFICANT CHANGE UP (ref 0.88–1.16)
LACTATE SERPL-SCNC: 1.5 MMOL/L — SIGNIFICANT CHANGE UP (ref 0.7–2)
LYMPHOCYTES # BLD AUTO: 2.95 K/UL — SIGNIFICANT CHANGE UP (ref 1–3.3)
LYMPHOCYTES # BLD AUTO: 39.3 % — SIGNIFICANT CHANGE UP (ref 13–44)
MCHC RBC-ENTMCNC: 29.6 PG — SIGNIFICANT CHANGE UP (ref 27–34)
MCHC RBC-ENTMCNC: 33.4 GM/DL — SIGNIFICANT CHANGE UP (ref 32–36)
MCV RBC AUTO: 88.7 FL — SIGNIFICANT CHANGE UP (ref 80–100)
MONOCYTES # BLD AUTO: 0.64 K/UL — SIGNIFICANT CHANGE UP (ref 0–0.9)
MONOCYTES NFR BLD AUTO: 8.5 % — SIGNIFICANT CHANGE UP (ref 2–14)
NEUTROPHILS # BLD AUTO: 3.62 K/UL — SIGNIFICANT CHANGE UP (ref 1.8–7.4)
NEUTROPHILS NFR BLD AUTO: 48.2 % — SIGNIFICANT CHANGE UP (ref 43–77)
NRBC # BLD: 0 /100 WBCS — SIGNIFICANT CHANGE UP (ref 0–0)
PLATELET # BLD AUTO: 243 K/UL — SIGNIFICANT CHANGE UP (ref 150–400)
POTASSIUM SERPL-MCNC: 3.7 MMOL/L — SIGNIFICANT CHANGE UP (ref 3.5–5.3)
POTASSIUM SERPL-SCNC: 3.7 MMOL/L — SIGNIFICANT CHANGE UP (ref 3.5–5.3)
PROT SERPL-MCNC: 7.4 G/DL — SIGNIFICANT CHANGE UP (ref 6–8.3)
PROTHROM AB SERPL-ACNC: 10.6 SEC — SIGNIFICANT CHANGE UP (ref 10.5–13.4)
RBC # BLD: 4.69 M/UL — SIGNIFICANT CHANGE UP (ref 4.2–5.8)
RBC # FLD: 12.7 % — SIGNIFICANT CHANGE UP (ref 10.3–14.5)
SARS-COV-2 RNA SPEC QL NAA+PROBE: SIGNIFICANT CHANGE UP
SODIUM SERPL-SCNC: 144 MMOL/L — SIGNIFICANT CHANGE UP (ref 135–145)
WBC # BLD: 7.51 K/UL — SIGNIFICANT CHANGE UP (ref 3.8–10.5)
WBC # FLD AUTO: 7.51 K/UL — SIGNIFICANT CHANGE UP (ref 3.8–10.5)

## 2022-05-20 PROCEDURE — 85652 RBC SED RATE AUTOMATED: CPT

## 2022-05-20 PROCEDURE — 99284 EMERGENCY DEPT VISIT MOD MDM: CPT | Mod: 25

## 2022-05-20 PROCEDURE — 99285 EMERGENCY DEPT VISIT HI MDM: CPT

## 2022-05-20 PROCEDURE — 96365 THER/PROPH/DIAG IV INF INIT: CPT

## 2022-05-20 PROCEDURE — 96368 THER/DIAG CONCURRENT INF: CPT

## 2022-05-20 PROCEDURE — 85730 THROMBOPLASTIN TIME PARTIAL: CPT

## 2022-05-20 PROCEDURE — 73660 X-RAY EXAM OF TOE(S): CPT | Mod: 26,RT

## 2022-05-20 PROCEDURE — 36415 COLL VENOUS BLD VENIPUNCTURE: CPT

## 2022-05-20 PROCEDURE — 86140 C-REACTIVE PROTEIN: CPT

## 2022-05-20 PROCEDURE — 80053 COMPREHEN METABOLIC PANEL: CPT

## 2022-05-20 PROCEDURE — 87040 BLOOD CULTURE FOR BACTERIA: CPT

## 2022-05-20 PROCEDURE — 73660 X-RAY EXAM OF TOE(S): CPT

## 2022-05-20 PROCEDURE — 85610 PROTHROMBIN TIME: CPT

## 2022-05-20 PROCEDURE — 82962 GLUCOSE BLOOD TEST: CPT

## 2022-05-20 PROCEDURE — 85025 COMPLETE CBC W/AUTO DIFF WBC: CPT

## 2022-05-20 PROCEDURE — 99283 EMERGENCY DEPT VISIT LOW MDM: CPT

## 2022-05-20 PROCEDURE — 87635 SARS-COV-2 COVID-19 AMP PRB: CPT

## 2022-05-20 PROCEDURE — 83605 ASSAY OF LACTIC ACID: CPT

## 2022-05-20 RX ORDER — VANCOMYCIN HCL 1 G
1000 VIAL (EA) INTRAVENOUS ONCE
Refills: 0 | Status: COMPLETED | OUTPATIENT
Start: 2022-05-20 | End: 2022-05-20

## 2022-05-20 RX ORDER — SODIUM CHLORIDE 9 MG/ML
2000 INJECTION INTRAMUSCULAR; INTRAVENOUS; SUBCUTANEOUS ONCE
Refills: 0 | Status: COMPLETED | OUTPATIENT
Start: 2022-05-20 | End: 2022-05-20

## 2022-05-20 RX ORDER — CEPHALEXIN 500 MG
1 CAPSULE ORAL
Qty: 21 | Refills: 0
Start: 2022-05-20 | End: 2022-05-26

## 2022-05-20 RX ORDER — PIPERACILLIN AND TAZOBACTAM 4; .5 G/20ML; G/20ML
3.38 INJECTION, POWDER, LYOPHILIZED, FOR SOLUTION INTRAVENOUS ONCE
Refills: 0 | Status: COMPLETED | OUTPATIENT
Start: 2022-05-20 | End: 2022-05-20

## 2022-05-20 RX ADMIN — PIPERACILLIN AND TAZOBACTAM 3.38 GRAM(S): 4; .5 INJECTION, POWDER, LYOPHILIZED, FOR SOLUTION INTRAVENOUS at 16:50

## 2022-05-20 RX ADMIN — PIPERACILLIN AND TAZOBACTAM 200 GRAM(S): 4; .5 INJECTION, POWDER, LYOPHILIZED, FOR SOLUTION INTRAVENOUS at 16:17

## 2022-05-20 RX ADMIN — SODIUM CHLORIDE 2000 MILLILITER(S): 9 INJECTION INTRAMUSCULAR; INTRAVENOUS; SUBCUTANEOUS at 18:00

## 2022-05-20 RX ADMIN — Medication 1000 MILLIGRAM(S): at 17:36

## 2022-05-20 RX ADMIN — Medication 250 MILLIGRAM(S): at 16:36

## 2022-05-20 RX ADMIN — SODIUM CHLORIDE 2000 MILLILITER(S): 9 INJECTION INTRAMUSCULAR; INTRAVENOUS; SUBCUTANEOUS at 16:18

## 2022-05-20 NOTE — ED ADULT NURSE REASSESSMENT NOTE - NS ED NURSE REASSESS COMMENT FT1
pt aox4, feeling better, no sob, no n/v, voiding, Accu-Chek  92
1931- received pt from previous RN at this time. pt AOx4 breathing equal and unlabored talking in full sentences. pt denies pain resting comfortably in stretcher. pt and family at bedside aware of plan of care, awaiting repeat blood glucose as per provider. safety measures maintained. call bell within reach. will continue to monitor. CS, RN

## 2022-05-20 NOTE — ED PROVIDER NOTE - PATIENT PORTAL LINK FT
You can access the FollowMyHealth Patient Portal offered by [facility] by registering at the following website: [email or web address]. By joining [employer]’s FollowMyHealth portal, you will also be able to view your health information using other applications (apps) compatible with our system.

## 2022-05-20 NOTE — ED PROVIDER NOTE - PROGRESS NOTE DETAILS
Podiatry said dc with bactrim/keflex. All questions were answered. Discussed the importance of prompt, close medical follow-up. Patient will return with any changes, concerns or persistent/worsening symptoms.  Patient verbalized understanding.

## 2022-05-20 NOTE — CONSULT NOTE ADULT - SUBJECTIVE AND OBJECTIVE BOX
HPI: 50 y/o male with PMHx of DM, HLD present to the ED with right foot medial 1st MPJ ulcer. Pt states that he was walking on Sunday with new shoes and noticed a blister later that day. On Monday, he noticed a wound from the blistered area. Denies and trauma or any other injury to the right foot. He denies any pain to his feet, except with shoe gear. No other pedal complaints. Pt denies f/c/n/v/sob/cp. Pt present with daughter Elvira Cuenca.       51y year old Male seen at [facility] ED for ----------.  Denies any fever, chills, nausea, vomiting, chest pain, shortness of breath, or calf pain at this time.    REVIEW OF SYSTEMS    PAST MEDICAL & SURGICAL HISTORY:  Diabetes mellitus      Hypercholesteremia      No significant past surgical history          Allergies    No Known Allergies    Intolerances        MEDICATIONS  (STANDING):    MEDICATIONS  (PRN):      Social History:      FAMILY HISTORY:  FH: diabetes mellitus        Vital Signs Last 24 Hrs  T(C): 36.6 (20 May 2022 15:27), Max: 36.6 (20 May 2022 15:27)  T(F): 97.8 (20 May 2022 15:27), Max: 97.8 (20 May 2022 15:27)  HR: 81 (20 May 2022 15:27) (81 - 81)  BP: 119/75 (20 May 2022 15:27) (119/75 - 119/75)  BP(mean): --  RR: 16 (20 May 2022 15:27) (16 - 16)  SpO2: 99% (20 May 2022 15:27) (99% - 99%)    PHYSICAL EXAM:  Vascular: DP & PT palpable bilaterally, Capillary refill 3 seconds, Digital hair present bilaterally  Neurological: Light touch sensation intact bilaterally  Musculoskeletal: 5/5 strength in all quadrants bilaterally, AJ & STJ ROM intact  Dermatological: 1.5 x1.5 cm ulceration noted to the medial aspect of the right hallux MPJ, granular wound bed, no probe to bone, no periwound erythema, no fluctuance, no malodor, no proximal streaking at this time. Left hallux MPJ, mild blister noted    CBC Full  -  ( 20 May 2022 16:25 )  WBC Count : 7.51 K/uL  RBC Count : 4.69 M/uL  Hemoglobin : 13.9 g/dL  Hematocrit : 41.6 %  Platelet Count - Automated : 243 K/uL  Mean Cell Volume : 88.7 fl  Mean Cell Hemoglobin : 29.6 pg  Mean Cell Hemoglobin Concentration : 33.4 gm/dL  Auto Neutrophil # : 3.62 K/uL  Auto Lymphocyte # : 2.95 K/uL  Auto Monocyte # : 0.64 K/uL  Auto Eosinophil # : 0.24 K/uL  Auto Basophil # : 0.03 K/uL  Auto Neutrophil % : 48.2 %  Auto Lymphocyte % : 39.3 %  Auto Monocyte % : 8.5 %  Auto Eosinophil % : 3.2 %  Auto Basophil % : 0.4 %      ----------CHEM PANEL----------              ACC: [id number] EXAM: XR TOE(S) MIN 2 VIEWS RT    PROCEDURE DATE: 05/20/2022        INTERPRETATION: TECHNIQUE: AP, lateral, and oblique views of the RIGHT toes are submitted.    HISTORY: First toe infection.    FINDINGS: First toe medial soft tissue ulceration. Adjacent osseous and joint structures of the first hallux are intact. No evidence of fracture or osteolysis or osteomyelitis. Remaining RIGHT foot toes intact. Metatarsal bones are unremarkable.      IMPRESSION: Suspect first toe medial soft tissue ulceration. No subcutaneous air or radiographic evidence of osteomyelitis.    --- End of Report ---

## 2022-05-20 NOTE — ED PROVIDER NOTE - NS ED ATTENDING STATEMENT MOD
This was a shared visit with the PREETI. I reviewed and verified the documentation and independently performed the documented:

## 2022-05-20 NOTE — ED PROVIDER NOTE - OBJECTIVE STATEMENT
52 y/o male with PMHx DM presents today due to right toe wound x 1 week. reports swelling and redness. pt insulin dependent. pt has not followed with DPM. pt reports only pain with walking and putting on shoe. pt denies fever, vomiting, current abx use, trauma, or any other complaints.

## 2022-05-20 NOTE — ED PROVIDER NOTE - CARE PROVIDER_API CALL
Jim Mcwilliams (DPM)  Podiatric Medicine  [address] [place] [address]  Phone: [phone]  Fax: [phone]  Follow Up Time: 1-3 Days

## 2022-05-20 NOTE — ED PROVIDER NOTE - CLINICAL SUMMARY MEDICAL DECISION MAKING FREE TEXT BOX
Pt presenting with ulceration to tow. No significant surrounding cellulitis and appears mostly superficial although wound healing will be impaired by pt DM. Will obtain screening labs, image foot, and consult with wound care. Will given abx.

## 2022-05-20 NOTE — ED PROVIDER NOTE - ATTENDING APP SHARED VISIT CONTRIBUTION OF CARE
Pt is a 50 yo male who presents to the ED with a cc of right toe pain. PMHx of DM, HLD. Pt reports that he had noted a right toe wound on Monday. Denies noting any trauma. He reports that he was walking and that he first developed a blister which then became a wound. Pt only noted pain with walking and when putting on his shoe. Pt also noted a foul odor and some discharge from the site. Denies fever, chills, N/V, CP, SOB, abd pain. Pt reports that his blood sugars are not well controlled. Pt is unsure what prescriptions he takes.     [facility]       52 y/o male with PMHx DM presents today due to right toe wound x 1 week. reports swelling and redness. pt insulin dependent. pt has not followed with DPM. pt reports only pain with walking and putting on shoe. pt denies fever, vomiting, current abx use, trauma, or any other complaints. Pt is a 52 yo male who presents to the ED with a cc of right toe pain. PMHx of DM, HLD. Pt reports that he had noted a right toe wound on Monday. Denies noting any trauma. He reports that he was walking and that he first developed a blister which then became a wound. Pt only noted pain with walking and when putting on his shoe. Pt also noted a foul odor and some discharge from the site. Denies fever, chills, N/V, CP, SOB, abd pain. Pt reports that his blood sugars are not well controlled. Pt is unsure what prescriptions he takes. On exam pt lying in bed NAD, NCAT, PERRL, EOMI, heart RR, lungs CTA, abd soft NT/ND. There appears to be developing chronic venous stasis changes noted to bilateral lower ext. Left foot: developing blister to 1st toe medial aspect no broken skin at this time. +pedal pulse cap refill 3-4 seconds sensation grossly intact. Right foot: approx 1 cm circular ulceration noted to 1st toe medial aspect appears superficial at this time with no significant surrounding cellulitis. Sensation grossly intact +pedal pulse cap refill 3-4 seconds. Pt presenting with ulceration to tow. No significant surrounding cellulitis and appears mostly superficial although wound healing will be impaired by pt DM. Will obtain screening labs, image foot, and consult with wound care. Will given abx.

## 2022-05-20 NOTE — ED PROVIDER NOTE - NSFOLLOWUPINSTRUCTIONS_ED_ALL_ED_FT
Follow up with podiatry/wound care. Return for worsening redness/swelling, fever, discharge, worsening rash. Antibiotics were sent to your pharmacy.     Celulitis, en adultos    Cellulitis, Adult       La celulitis es ramiro infección de la piel. La claudette infectada generalmente está caliente, de color hector, hinchada y duele. Esta afección ocurre con más frecuencia en los brazos y en la parte inferior de las piernas. La infección puede diseminarse al tejido subyacente, los músculos y la taiwo, y volverse grave. Es importante realizar un tratamiento para esta afección.      ¿Cuáles son las causas?    La celulitis está causada por bacterias. Las bacterias ingresan a través de ramiro lesión cutánea, por ejemplo, un scooby, ramiro quemadura, ramiro picadura de insecto, ramiro llaga abierta o ramiro grieta.      ¿Qué incrementa el riesgo?    Es más probable que esta afección se manifieste en personas que:  •Tienen debilitado el sistema de defensa del organismo (sistema inmunitario).      •Tienen heridas abiertas en la piel, marley george, quemaduras, picaduras y rasguños. Las bacterias pueden entrar al cuerpo a través de estas heridas abiertas.      •Son mayores de 60 años de edad.      •Tienen diabetes.      •Tienen un tipo de enfermedad hepática de larga duración (crónica) o enfermedad renal (cirrosis).      •Tienen obesidad.    •Tienen ramiro afección en la piel, por ejemplo:  •Urticaria que pica (eczema).      •Movimiento lento de la taiwo en las venas (estasis venosa).      •Acumulación de líquido debajo de la piel (edema).        •[place]ward recibido radioterapia.      •Consumen drogas por vía intravenosa.        ¿Cuáles son los signos o los síntomas?    Los síntomas de esta afección incluyen los siguientes:  •Enrojecimiento, estrías o manchas en la piel.      •Claudette de la piel hinchada.      •Dolor o sensibilidad al tacto en ramiro claudette de la piel.      •Calor en la piel.      •Fiebre.      •Escalofríos.      •Ampollas.        ¿Cómo se diagnostica?    Esta afección se diagnostica en función de los antecedentes médicos y un examen físico. También pueden hacerle exámenes, que incluyen los siguientes:  •Análisis de taiwo.      •Estudios de diagnóstico por imágenes.        ¿Cómo se trata?    El tratamiento de esta afección puede incluir lo siguiente:  •Medicamentos, marley antibióticos o medicamentos para tratar alergias (antihistamínicos).      •Tratamiento complementario, marley descanso y aplicación de paños fríos o tibios (compresas) en la piel.      •Hospitalización, si la afección es grave.      Por lo general, la infección comienza a mejorar en 1 o 2 días de tratamiento.      Siga estas indicaciones en [place] casa:     Medicamentos     •Juana Diaz los medicamentos de venta christiano y los recetados solamente marley se lo haya indicado el médico.      •Si le recetaron un antibiótico, tómelo marley se lo haya indicado el médico. No deje de matheus el antibiótico, aunque comience a sentirse mejor.      Indicaciones generales     •Jocelin suficiente líquido marley para mantener la orina de color amarillo pálido.      • No toque ni frote la claudette infectada.      •Cuando esté sentado o acostado, levante (eleve) la claudette infectada por encima del nivel del corazón.      •Aplique compresas frías o tibias en la claudette afectada marley se lo haya indicado el médico.      •Concurra a todas las visitas de seguimiento marley se lo haya indicado el médico. [place] es importante. En estas visitas, el médico puede asegurarse de que no se desarrolla ramiro infección más grave.        Comuníquese con un médico si:    •Tiene fiebre.      •Los síntomas no empiezan a mejorar en el plazo de 1 o 2 días después de comenzar el tratamiento.      •El hueso o la articulación que se encuentran por debajo de la claudette infectada le duelen después de que la piel se machelle.      •La infección se repite en la misma claudette o en ramiro claudette diferente.      •Tiene ramiro protuberancia inflamada en la claudette infectada.      •Tiene nuevos síntomas.      •Se siente enfermo (malestar general) con braden y molestias musculares.        Solicite ayuda inmediatamente si:    •Mannie síntomas empeoran.      •Se siente muy somnoliento.      •Tiene vómitos o diarrea que no desaparecen.      •Observa ramiro línea paris en la piel que sale desde la claudette infectada.      •La claudette paris se extiende o se vuelve de color oscuro.      Estos síntomas pueden representar un problema grave que constituye ramiro emergencia. No espere a deandra si los síntomas desaparecen. Solicite atención médica de inmediato. Comuníquese con el servicio de emergencias de [place] localidad (911 en [place] No conduzca por mannie propios medios hasta el hospital.       Resumen    •La celulitis es ramiro infección de la piel. Esta afección ocurre con más frecuencia en los brazos y en la parte inferior de las piernas.      •El tratamiento de esta afección puede incluir medicamentos, marley antibióticos o antihistamínicos.      •Juana Diaz los medicamentos de venta christiano y los recetados solamente marley se lo haya indicado el médico. Si le recetaron un antibiótico, no deje de tomarlo aunque comience a sentirse mejor.      •Comuníquese con un médico si mannie síntomas no empiezan a mejorar en el plazo de 1 o 2 días después de comenzar el tratamiento o si mannie síntomas empeoran.      •Concurra a todas las visitas de seguimiento marley se lo haya indicado el médico. [place] es importante. En estas visitas, el médico puede asegurarse de que no se está desarrollando ramiro infección más grave.      Esta información no tiene marley fin reemplazar el consejo del médico. Asegúrese de hacerle al médico cualquier pregunta que tenga.

## 2022-05-30 PROBLEM — Z00.00 ENCOUNTER FOR PREVENTIVE HEALTH EXAMINATION: Status: ACTIVE | Noted: 2022-05-30

## 2022-05-31 ENCOUNTER — APPOINTMENT (OUTPATIENT)
Dept: WOUND CARE | Facility: HOSPITAL | Age: 52
End: 2022-05-31

## 2022-08-27 ENCOUNTER — EMERGENCY (EMERGENCY)
Facility: HOSPITAL | Age: 52
LOS: 1 days | Discharge: HOME | End: 2022-08-27
Attending: EMERGENCY MEDICINE | Admitting: EMERGENCY MEDICINE
Payer: MEDICAID

## 2022-08-27 VITALS
HEIGHT: 64 IN | HEART RATE: 87 BPM | TEMPERATURE: 98 F | OXYGEN SATURATION: 98 % | RESPIRATION RATE: 16 BRPM | DIASTOLIC BLOOD PRESSURE: 86 MMHG | SYSTOLIC BLOOD PRESSURE: 134 MMHG | WEIGHT: 145.06 LBS

## 2022-08-27 VITALS
SYSTOLIC BLOOD PRESSURE: 130 MMHG | DIASTOLIC BLOOD PRESSURE: 80 MMHG | OXYGEN SATURATION: 98 % | RESPIRATION RATE: 18 BRPM | HEART RATE: 80 BPM | TEMPERATURE: 98 F

## 2022-08-27 LAB
ALBUMIN SERPL ELPH-MCNC: 3.4 G/DL — SIGNIFICANT CHANGE UP (ref 3.3–5)
ALP SERPL-CCNC: 83 U/L — SIGNIFICANT CHANGE UP (ref 40–120)
ALT FLD-CCNC: 20 U/L — SIGNIFICANT CHANGE UP (ref 12–78)
ANION GAP SERPL CALC-SCNC: 4 MMOL/L — LOW (ref 5–17)
AST SERPL-CCNC: 14 U/L — LOW (ref 15–37)
BASOPHILS # BLD AUTO: 0.02 K/UL — SIGNIFICANT CHANGE UP (ref 0–0.2)
BASOPHILS NFR BLD AUTO: 0.3 % — SIGNIFICANT CHANGE UP (ref 0–2)
BILIRUB SERPL-MCNC: 0.3 MG/DL — SIGNIFICANT CHANGE UP (ref 0.2–1.2)
BUN SERPL-MCNC: 14 MG/DL — SIGNIFICANT CHANGE UP (ref 7–23)
CALCIUM SERPL-MCNC: 9 MG/DL — SIGNIFICANT CHANGE UP (ref 8.5–10.1)
CHLORIDE SERPL-SCNC: 105 MMOL/L — SIGNIFICANT CHANGE UP (ref 96–108)
CO2 SERPL-SCNC: 30 MMOL/L — SIGNIFICANT CHANGE UP (ref 22–31)
CREAT SERPL-MCNC: 0.91 MG/DL — SIGNIFICANT CHANGE UP (ref 0.5–1.3)
EGFR: 103 ML/MIN/1.73M2 — SIGNIFICANT CHANGE UP
EOSINOPHIL # BLD AUTO: 0.08 K/UL — SIGNIFICANT CHANGE UP (ref 0–0.5)
EOSINOPHIL NFR BLD AUTO: 1.4 % — SIGNIFICANT CHANGE UP (ref 0–6)
GLUCOSE SERPL-MCNC: 258 MG/DL — HIGH (ref 70–99)
HCT VFR BLD CALC: 44.3 % — SIGNIFICANT CHANGE UP (ref 39–50)
HGB BLD-MCNC: 15.3 G/DL — SIGNIFICANT CHANGE UP (ref 13–17)
IMM GRANULOCYTES NFR BLD AUTO: 0.7 % — SIGNIFICANT CHANGE UP (ref 0–1.5)
LYMPHOCYTES # BLD AUTO: 1.65 K/UL — SIGNIFICANT CHANGE UP (ref 1–3.3)
LYMPHOCYTES # BLD AUTO: 28.4 % — SIGNIFICANT CHANGE UP (ref 13–44)
MAGNESIUM SERPL-MCNC: 2.1 MG/DL — SIGNIFICANT CHANGE UP (ref 1.6–2.6)
MCHC RBC-ENTMCNC: 29.8 PG — SIGNIFICANT CHANGE UP (ref 27–34)
MCHC RBC-ENTMCNC: 34.5 GM/DL — SIGNIFICANT CHANGE UP (ref 32–36)
MCV RBC AUTO: 86.2 FL — SIGNIFICANT CHANGE UP (ref 80–100)
MONOCYTES # BLD AUTO: 0.74 K/UL — SIGNIFICANT CHANGE UP (ref 0–0.9)
MONOCYTES NFR BLD AUTO: 12.7 % — SIGNIFICANT CHANGE UP (ref 2–14)
NEUTROPHILS # BLD AUTO: 3.28 K/UL — SIGNIFICANT CHANGE UP (ref 1.8–7.4)
NEUTROPHILS NFR BLD AUTO: 56.5 % — SIGNIFICANT CHANGE UP (ref 43–77)
NRBC # BLD: 0 /100 WBCS — SIGNIFICANT CHANGE UP (ref 0–0)
PLATELET # BLD AUTO: 179 K/UL — SIGNIFICANT CHANGE UP (ref 150–400)
POTASSIUM SERPL-MCNC: 4.9 MMOL/L — SIGNIFICANT CHANGE UP (ref 3.5–5.3)
POTASSIUM SERPL-SCNC: 4.9 MMOL/L — SIGNIFICANT CHANGE UP (ref 3.5–5.3)
PROT SERPL-MCNC: 7.3 G/DL — SIGNIFICANT CHANGE UP (ref 6–8.3)
RBC # BLD: 5.14 M/UL — SIGNIFICANT CHANGE UP (ref 4.2–5.8)
RBC # FLD: 12.3 % — SIGNIFICANT CHANGE UP (ref 10.3–14.5)
SODIUM SERPL-SCNC: 139 MMOL/L — SIGNIFICANT CHANGE UP (ref 135–145)
TROPONIN I, HIGH SENSITIVITY RESULT: 3.4 NG/L — SIGNIFICANT CHANGE UP
WBC # BLD: 5.81 K/UL — SIGNIFICANT CHANGE UP (ref 3.8–10.5)
WBC # FLD AUTO: 5.81 K/UL — SIGNIFICANT CHANGE UP (ref 3.8–10.5)

## 2022-08-27 PROCEDURE — 96360 HYDRATION IV INFUSION INIT: CPT

## 2022-08-27 PROCEDURE — 93925 LOWER EXTREMITY STUDY: CPT | Mod: 26

## 2022-08-27 PROCEDURE — 80053 COMPREHEN METABOLIC PANEL: CPT

## 2022-08-27 PROCEDURE — 85025 COMPLETE CBC W/AUTO DIFF WBC: CPT

## 2022-08-27 PROCEDURE — 99285 EMERGENCY DEPT VISIT HI MDM: CPT

## 2022-08-27 PROCEDURE — 84484 ASSAY OF TROPONIN QUANT: CPT

## 2022-08-27 PROCEDURE — 93925 LOWER EXTREMITY STUDY: CPT

## 2022-08-27 PROCEDURE — 83735 ASSAY OF MAGNESIUM: CPT

## 2022-08-27 PROCEDURE — 99284 EMERGENCY DEPT VISIT MOD MDM: CPT | Mod: 25

## 2022-08-27 PROCEDURE — 36415 COLL VENOUS BLD VENIPUNCTURE: CPT

## 2022-08-27 RX ORDER — SODIUM CHLORIDE 9 MG/ML
1000 INJECTION INTRAMUSCULAR; INTRAVENOUS; SUBCUTANEOUS ONCE
Refills: 0 | Status: COMPLETED | OUTPATIENT
Start: 2022-08-27 | End: 2022-08-27

## 2022-08-27 RX ORDER — GABAPENTIN 400 MG/1
1 CAPSULE ORAL
Qty: 14 | Refills: 0
Start: 2022-08-27 | End: 2022-09-09

## 2022-08-27 RX ADMIN — SODIUM CHLORIDE 1000 MILLILITER(S): 9 INJECTION INTRAMUSCULAR; INTRAVENOUS; SUBCUTANEOUS at 14:10

## 2022-08-27 RX ADMIN — SODIUM CHLORIDE 1000 MILLILITER(S): 9 INJECTION INTRAMUSCULAR; INTRAVENOUS; SUBCUTANEOUS at 13:10

## 2022-08-27 NOTE — ED PROVIDER NOTE - NSFOLLOWUPINSTRUCTIONS_ED_ALL_ED_FT
Gabapentin nightly.  Follow up with medical doctor.  Return to the Emergency Department for worsening or concerning symptoms.      La neuropatía diabética hace referencia al daño nervioso ocasionado por la diabetes. Con el tiempo, las personas diabéticas pueden sufrir un daño nervioso de todo el cuerpo. Hay varios tipos de neuropatía diabética:  •Neuropatía periférica. Kalyani es el tipo más frecuente de neuropatía diabética. Daña los nervios que transportan señales entre la médula garcia y otras partes del cuerpo (nervios periféricos). [place] suele afectar a los nervios de los pies, las piernas, las catarino y los brazos.      •Neuropatía autónoma. Kalyani tipo causa daño en los nervios que controlan las funciones involuntarias (nervios autónomos). Las funciones involuntarias son funciones del cuerpo que venus no controla. Estas incluyen los latidos cardíacos, la temperatura corporal, la presión arterial, la micción, la digestión, la sudoración, la función sexual o respuesta a cambios en la glucemia.      •Neuropatía focal. Kalyani tipo de daño nervioso afecta ramiro timothy del cuerpo, marley un brazo, ramiro pierna o el brendon. La lesión puede comprometer un nervio o un pequeño audrey de nervios. La neuropatía focal puede ser dolorosa e impredecible. Se produce con mayor frecuencia en los adultos de edad avanzada que tienen diabetes. Esta se manifiesta de forma repentina, aunque generalmente mejora con el tiempo y no causa problemas a chris plazo.      •Neuropatía proximal. Kalyani tipo de daño nervioso afecta los nervios de los muslos, las caderas, las nalgas o las piernas. Causa dolor intenso, debilidad y muerte del tejido muscular (atrofia), generalmente en los músculos de los muslos. Es más frecuente en los hombres mayores y las personas que tienen diabetes de tipo 2. El tiempo de recuperación puede variar.        ¿Cuáles son las causas?    Las neuropatías periféricas, autónomas y focales son consecuencia de la diabetes que no se controla correctamente con un tratamiento. Si héctor se desconoce la causa de la neuropatía proximal, esta puede ser provocada por ramiro inflamación relacionada con los niveles no controlados de glucemia.      ¿Cuáles son los signos o síntomas?    Neuropatía periférica     La neuropatía periférica se presenta lentamente a lo chris del tiempo. Cuando los nervios de los pies y las piernas ya no funcionan, usted puede sentir:  •Ardor, sensación pulsátil o dolor en las piernas o los pies.      •Dolor o calambres en las piernas o los pies.    •Pérdida de la sensibilidad (adormecimiento) e incapacidad para sentir presión o dolor en los pies. Estas pueden ser las consecuencias:  •Callosidades gruesas o llagas en las zonas de presión rosalba.      •Úlceras.      •Capacidad reducida para sentir los cambios de temperatura.        •Deformidades en el pie.      •Debilidad muscular.      •Pérdida del equilibrio o de la coordinación.      Neuropatía autónoma    Los síntomas varían según qué nervios están afectados. Entre los síntomas, se pueden incluir los siguientes:•Problemas digestivos, marley los siguientes:  •Náuseas o vómitos.      •Falta de apetito.      •Distensión.      •Diarrea o estreñimiento.      •Dificultad para tragar.      •Pérdida de peso involuntaria.      •Problemas con el corazón, la taiwo y los pulmones, por ejemplo:  •Mareos, especialmente al ponerse de pie.      •Desmayos.      •Falta de aire.      •Latidos cardíacos irregulares.      •Problemas de la vejiga, por ejemplo:  •Dificultad para comenzar o detener la micción.      •Pérdida de orina.      •Problemas para vaciar la vejiga.      •Infecciones urinarias (IU).      •Problemas con otras funciones del cuerpo, por ejemplo:  •Transpiración. Puede transpirar demasiado o muy poco.      •Temperatura. Se puede acalorar fácilmente. O puede sentir más frío de lo habitual.      •La función sexual. Es posible que los hombres no puedan lograr ramiro erección o mantenerla. Las mujeres pueden tener sequedad vaginal y problemas de excitación sexual.        Neuropatía focal    Los síntomas afectan solamente ramiro timothy del cuerpo. Los síntomas frecuentes son:  •Adormecimiento.      •Hormigueo.      •Dolor urente.      •Sensación de pinchazos.      •Piel muy sensible.      •Debilidad.      •Incapacidad para moverse (parálisis).      •Fasciculaciones (temblores musculares).      •Disminución (pérdida) de la masa muscular.      •Coordinación deficiente.      •Visión doble o borrosa.      Neuropatía proximal    •Dolor intenso y repentino en la cadera, los muslos o las nalgas. Dolor que baja por la espalda hasta las piernas (ciática).      •Dolor y adormecimiento en los brazos y la piernas.      •Hormigueo.      •Pérdida del control del intestino o de la vejiga.      •Debilidad y desgaste de los músculos del muslo.      •Dificultad para levantarse desde ramiro posición de sentado.      •Hinchazón abdominal.      •Pérdida de peso sin causa aparente.        ¿Cómo se diagnostica?    El diagnóstico varía en función del tipo de neuropatía que el médico puede sospechar.    Neuropatía periférica     Smith médico le hará un examen neurológico. En kalyani examen, se evalúan los reflejos y cómo se mueve y qué puede sentir usted. También pueden hacerle otros estudios, por ejemplo:  •Análisis de taiwo.      •Análisis del líquido que rodea la médula garcia (punción lumbar).      •Exploración por tomografía computarizada (TC).      •Resonancia magnética (RM).      •Evaluar los nervios que controlan los músculos (electromiografía o EMG).      •Evaluar la rapidez con la que las señales pasan a través de los nervios (estudio de conducción nerviosa).      •Estudiar un pequeño trozo de un nervio con microscopio (biopsia).      Neuropatía autónoma    Pueden hacerle estudios, por ejemplo:  •Estudios para medirle la presión arterial y la frecuencia cardíaca. Es posible que lo sujeten de forma maher a ramiro smith de exploración que lo moverá de ramiro posición horizontal a ramiro posición vertical (prueba de la smith basculante).      •Estudios de la respiración para examinar los pulmones.      •Estudios para examinar cómo se mueven los alimentos a través del aparato digestivo (estudio del vaciado gástrico).      •Análisis de taiwo, de transpiración y de orina.      •Ecografía de la vejiga.      •Análisis del líquido cefalorraquídeo.      Neuropatía focal    Esta afección puede diagnosticarse en función de lo siguiente:  •Un examen neurológico.      •Exploración por tomografía computarizada (TC).      •Resonancia magnética (RM).      •Electromiografía (EMG).      •Estudio de conducción nerviosa.      Neuropatía proximal    No hay un estudio específico para diagnosticar kalyani tipo de neuropatía. Es posible que también le jillian análisis para descartar otras posibles causas de kalyani tipo de neuropatía. Las pruebas pueden incluir las siguientes:  •Radiografía de la columna vertebral y la región lumbar.      •Punción lumbar.      •Resonancia magnética (RM).        ¿Cómo se trata?    El objetivo del tratamiento es impedir que el daño nervioso empeore. El tratamiento puede incluir:  •Seguir smith plan de control de la diabetes. [place] lo ayudará a mantener smith nivel de glucemia y smith nivel de A1C dentro de smith rango objetivo. Kalyani es el tratamiento más importante.      •Usar analgésicos recetados.        Siga estas instrucciones en smith casa:      Tratamiento de la diabetes      Siga el plan de control de la diabetes marley se lo haya indicado el médico.  •Controle smith nivel de glucemia.      •Mantenga smith glucemia dentro del rango objetivo.      •Hágase controlar el nivel de A1C al menos dos veces al año o con la frecuencia que le hayan indicado.      •[place] los medicamentos de venta christiano y los recetados solamente marley se lo haya indicado el médico. [place] incluye la insulina y los medicamentos para la diabetes.        Estilo de ny      • No consuma ningún producto que contenga nicotina o tabaco, marley cigarrillos, cigarrillos electrónicos y tabaco de mascar. Si necesita ayuda para dejar de fumar, consulte al médico.      •Chivo actividad física todos los charles. Incluya ejercicios de equilibrio y entrenamiento de la fuerza.      •Siga un plan de alimentación saludable.      •Trabaje con smith médico para mantener la presión arterial bajo control.      Instrucciones generales     •Pregúntele al médico si el medicamento recetado le impide conducir o usar maquinaria.      •Examine a diario smith piel y pies en busca de george, moretones, enrojecimiento, ampollas o llagas.      •Cumpla con todas las visitas de seguimiento. [place] es importante.        Comuníquese con un médico si:    •Siente ardor, sensación pulsátil o dolor en las piernas o los pies.      •Tiene incapacidad para sentir presión o dolor en el pie.    •Aparecen problemas digestivos marley:  •Náuseas.      •Vómitos.      •Distensión.      •Estreñimiento.      •Diarrea.      •Dolor abdominal.      •Tiene dificultad para orinar marley:  •Incapacidad de controlar la orina (incontinencia).      •Imposibilidad de vaciar la vejiga por completo (retención).        •Siente que se le acelera el corazón (palpitaciones).      •Se siente mareado, débil o se desmaya al ponerse de pie.        Solicite ayuda de inmediato si:    •No puede orinar.      •Súbitamente, siente debilidad o pierde la coordinación.      •Tiene dificultad para hablar.      •Siente dolor u opresión en el pecho.      •Tiene latidos cardíacos irregulares.      •De repente, no puede  ramiro parte del cuerpo.      Estos síntomas pueden representar un problema grave que constituye ramiro emergencia. No espere a deandra si los síntomas desaparecen. Solicite atención médica de inmediato. Comuníquese con el servicio de emergencias de [place] localidad (911 en [place] No conduzca por mannie propios medios hasta el hospital.       Resumen    •La neuropatía diabética es daño nervioso ocasionado por la diabetes. Puede causar adormecimiento y dolor en los brazos, las piernas, el tubo digestivo, el corazón y otros sistemas del cuerpo.      •El tratamiento de esta afección es mantener smith nivel de glucemia y smith nivel de A1C dentro de smith rango objetivo. [place] puede ayudar a evitar que la neuropatía empeore.      •Examine a diario smith piel y pies en busca de george, moretones, enrojecimiento, ampollas o llagas.      • No consuma ningún producto que contenga nicotina o tabaco, marley cigarrillos, cigarrillos electrónicos y tabaco de mascar.      Esta información no tiene marley fin reemplazar el consejo del médico. Asegúrese de hacerle al médico cualquier pregunta que tenga.

## 2022-08-27 NOTE — ED PROVIDER NOTE - PATIENT PORTAL LINK FT
You can access the FollowMyHealth Patient Portal offered by [facility] by registering at the following website: [email or web address]. By joining [employer]’s FollowMyHealth portal, you will also be able to view your health information using other applications (apps) compatible with our system.

## 2022-08-27 NOTE — ED PROVIDER NOTE - ATTENDING APP SHARED VISIT CONTRIBUTION OF CARE
Patient is a 51-year-old male who has a history of diabetes and hypercholesterolemia.  He does not know his hemoglobin A1c, but states his blood sugars have been usually between 1 and 200.  He takes both oral and hypoglycemic medicine and insulin therapy.  He has not seen a podiatrist for routine foot care.  He presents to the emergency room today with burning pain in his feet specifically his toes beginning mid leg down.  He denies any trauma, no travel, no fever, no chills, no calf swelling no shortness of breath.  He denies any redness, or discoloration.  He denies any back pain, no sciatica, no changes in his bowel or bladder habits.  His blood sugars have remained stable.  On evaluation    Is well-developed well-nourished male no apparent distress laying on the exam bed.  HEENT is normal exam heart lung exam is normal.  Abdominal exam is normal.  Musculoskeletal exam is normal.  Skin patient has paucity of skin on the distal portions of his lower extremities he has clubbing of his nails of fingers and toes.  There is no cyanosis or ecchymosis.  There is no evidence of cellulitis.  There is no edema.  There is no calf swelling.  He has decreased posterior tibial pulses and present dorsalis pedis pulses with normal capillary refill.  He has normal sensation to light touch.  Full range of motion.  No motor weakness.  He has negative straight leg raise..  He has no back pain or other musculoskeletal deformities.  Neurologically see above.     Plan of care assess laboratory studies to rule out electrolyte abnormalities: e.g. calcium, magnesium, glucose, sodium.  Doppler study of the legs rule out arterial vascular abnormalities.  Provide initiate Neurontin therapy for diabetic neuropathy.  Refer to vascular, and podiatry.

## 2022-08-27 NOTE — ED ADULT NURSE NOTE - NSIMPLEMENTINTERV_GEN_ALL_ED
Implemented All Universal Safety Interventions:  [place] to call system. Call bell, personal items and telephone within reach. Instruct patient to call for assistance. Room bathroom lighting operational. Non-slip footwear when patient is off stretcher. Physically safe environment: no spills, clutter or unnecessary equipment. Stretcher in lowest position, wheels locked, appropriate side rails in place.

## 2022-08-27 NOTE — ED PROVIDER NOTE - OBJECTIVE STATEMENT
51 M hx DM c/o worsening b/l leg pain x 2 weeks. States feels fatigue/burning/tingling to legs/feet/toes. Denies seeking medical attention for these symptoms prior to ED visit. Denies f/c, cp, sob, focal numbness or weakness.    pmd mark tesfaye

## 2022-08-27 NOTE — ED PROVIDER NOTE - CARE PROVIDER_API CALL
Dave Mas  [facility]  [address], [address]  [place] [address]  Phone: [phone]  Fax: [phone]  Follow Up Time:

## 2022-08-27 NOTE — ED ADULT NURSE NOTE - FINAL NURSING ELECTRONIC SIGNATURE
27-Aug-2022 17:35 5-Fu Pregnancy And Lactation Text: This medication is Pregnancy Category X and contraindicated in pregnancy and in women who may become pregnant. It is unknown if this medication is excreted in breast milk.

## 2022-08-27 NOTE — ED ADULT NURSE NOTE - CHIEF COMPLAINT QUOTE
leg pains for about 2 weeks,denies injury ,Hx of diabetes leg pains for about 2 weeks ,denies injury ,Hx of diabetes

## 2022-12-19 NOTE — ED PROVIDER NOTE - CROS ED SKIN ALL NEG
Patient having auditory hallucinations, MD Adia notified. Recommended one time dose of droperidol to help with hallucinations and help patient sleep. Medication given, patient more relaxed. Will continue to monitor.    - - -

## 2023-03-20 NOTE — ED PROVIDER NOTE - EYES NEGATIVE STATEMENT, MLM
[FreeTextEntry6] : EXPOSED TO STREP / MOM WANTS RAPID - PER GRANDMA \par TONSILS ENLARGED \par NO FEVERS\par BROTHER MICHAEL  HAD STREP 
no discharge, no irritation, no pain, no redness, and no visual changes.

## 2023-06-26 ENCOUNTER — TRANSCRIPTION ENCOUNTER (OUTPATIENT)
Age: 53
End: 2023-06-26

## 2023-06-27 ENCOUNTER — INPATIENT (INPATIENT)
Facility: HOSPITAL | Age: 53
LOS: 2 days | Discharge: HOME | DRG: 246 | End: 2023-06-30
Attending: STUDENT IN AN ORGANIZED HEALTH CARE EDUCATION/TRAINING PROGRAM | Admitting: STUDENT IN AN ORGANIZED HEALTH CARE EDUCATION/TRAINING PROGRAM
Payer: MEDICAID

## 2023-06-27 VITALS
DIASTOLIC BLOOD PRESSURE: 81 MMHG | OXYGEN SATURATION: 98 % | SYSTOLIC BLOOD PRESSURE: 150 MMHG | HEART RATE: 86 BPM | TEMPERATURE: 98 F | RESPIRATION RATE: 18 BRPM

## 2023-06-27 DIAGNOSIS — E11.9 TYPE 2 DIABETES MELLITUS WITHOUT COMPLICATIONS: ICD-10-CM

## 2023-06-27 DIAGNOSIS — I24.9 ACUTE ISCHEMIC HEART DISEASE, UNSPECIFIED: ICD-10-CM

## 2023-06-27 DIAGNOSIS — I21.4 NON-ST ELEVATION (NSTEMI) MYOCARDIAL INFARCTION: ICD-10-CM

## 2023-06-27 DIAGNOSIS — I10 ESSENTIAL (PRIMARY) HYPERTENSION: ICD-10-CM

## 2023-06-27 DIAGNOSIS — E78.5 HYPERLIPIDEMIA, UNSPECIFIED: ICD-10-CM

## 2023-06-27 DIAGNOSIS — Z29.9 ENCOUNTER FOR PROPHYLACTIC MEASURES, UNSPECIFIED: ICD-10-CM

## 2023-06-27 LAB
ALBUMIN SERPL ELPH-MCNC: 3.3 G/DL — SIGNIFICANT CHANGE UP (ref 3.3–5)
ALP SERPL-CCNC: 88 U/L — SIGNIFICANT CHANGE UP (ref 40–120)
ALT FLD-CCNC: 25 U/L — SIGNIFICANT CHANGE UP (ref 12–78)
ANION GAP SERPL CALC-SCNC: 3 MMOL/L — LOW (ref 5–17)
AST SERPL-CCNC: 16 U/L — SIGNIFICANT CHANGE UP (ref 15–37)
BASOPHILS # BLD AUTO: 0.04 K/UL — SIGNIFICANT CHANGE UP (ref 0–0.2)
BASOPHILS NFR BLD AUTO: 0.5 % — SIGNIFICANT CHANGE UP (ref 0–2)
BILIRUB SERPL-MCNC: 0.3 MG/DL — SIGNIFICANT CHANGE UP (ref 0.2–1.2)
BUN SERPL-MCNC: 22 MG/DL — SIGNIFICANT CHANGE UP (ref 7–23)
CALCIUM SERPL-MCNC: 9 MG/DL — SIGNIFICANT CHANGE UP (ref 8.5–10.1)
CHLORIDE SERPL-SCNC: 101 MMOL/L — SIGNIFICANT CHANGE UP (ref 96–108)
CK MB BLD-MCNC: 2.8 % — SIGNIFICANT CHANGE UP (ref 0–3.5)
CK MB CFR SERPL CALC: 3.8 NG/ML — HIGH (ref 0–3.6)
CK SERPL-CCNC: 138 U/L — SIGNIFICANT CHANGE UP (ref 26–308)
CO2 SERPL-SCNC: 29 MMOL/L — SIGNIFICANT CHANGE UP (ref 22–31)
CREAT SERPL-MCNC: 1.1 MG/DL — SIGNIFICANT CHANGE UP (ref 0.5–1.3)
D DIMER BLD IA.RAPID-MCNC: <150 NG/ML DDU — SIGNIFICANT CHANGE UP
EGFR: 82 ML/MIN/1.73M2 — SIGNIFICANT CHANGE UP
EOSINOPHIL # BLD AUTO: 0.18 K/UL — SIGNIFICANT CHANGE UP (ref 0–0.5)
EOSINOPHIL NFR BLD AUTO: 2.3 % — SIGNIFICANT CHANGE UP (ref 0–6)
GLUCOSE SERPL-MCNC: 368 MG/DL — HIGH (ref 70–99)
HCT VFR BLD CALC: 40.1 % — SIGNIFICANT CHANGE UP (ref 39–50)
HGB BLD-MCNC: 13.9 G/DL — SIGNIFICANT CHANGE UP (ref 13–17)
IMM GRANULOCYTES NFR BLD AUTO: 0.6 % — SIGNIFICANT CHANGE UP (ref 0–0.9)
LYMPHOCYTES # BLD AUTO: 2.35 K/UL — SIGNIFICANT CHANGE UP (ref 1–3.3)
LYMPHOCYTES # BLD AUTO: 29.4 % — SIGNIFICANT CHANGE UP (ref 13–44)
MCHC RBC-ENTMCNC: 29.6 PG — SIGNIFICANT CHANGE UP (ref 27–34)
MCHC RBC-ENTMCNC: 34.7 GM/DL — SIGNIFICANT CHANGE UP (ref 32–36)
MCV RBC AUTO: 85.5 FL — SIGNIFICANT CHANGE UP (ref 80–100)
MONOCYTES # BLD AUTO: 0.7 K/UL — SIGNIFICANT CHANGE UP (ref 0–0.9)
MONOCYTES NFR BLD AUTO: 8.8 % — SIGNIFICANT CHANGE UP (ref 2–14)
NEUTROPHILS # BLD AUTO: 4.68 K/UL — SIGNIFICANT CHANGE UP (ref 1.8–7.4)
NEUTROPHILS NFR BLD AUTO: 58.4 % — SIGNIFICANT CHANGE UP (ref 43–77)
NRBC # BLD: 0 /100 WBCS — SIGNIFICANT CHANGE UP (ref 0–0)
PLATELET # BLD AUTO: 180 K/UL — SIGNIFICANT CHANGE UP (ref 150–400)
POTASSIUM SERPL-MCNC: 4.1 MMOL/L — SIGNIFICANT CHANGE UP (ref 3.5–5.3)
POTASSIUM SERPL-SCNC: 4.1 MMOL/L — SIGNIFICANT CHANGE UP (ref 3.5–5.3)
PROT SERPL-MCNC: 6.7 G/DL — SIGNIFICANT CHANGE UP (ref 6–8.3)
RBC # BLD: 4.69 M/UL — SIGNIFICANT CHANGE UP (ref 4.2–5.8)
RBC # FLD: 12.5 % — SIGNIFICANT CHANGE UP (ref 10.3–14.5)
SODIUM SERPL-SCNC: 133 MMOL/L — LOW (ref 135–145)
TROPONIN I, HIGH SENSITIVITY RESULT: 14.3 NG/L — SIGNIFICANT CHANGE UP
TROPONIN I, HIGH SENSITIVITY RESULT: 310.4 NG/L — HIGH
TROPONIN I, HIGH SENSITIVITY RESULT: 856.9 NG/L — HIGH
WBC # BLD: 8 K/UL — SIGNIFICANT CHANGE UP (ref 3.8–10.5)
WBC # FLD AUTO: 8 K/UL — SIGNIFICANT CHANGE UP (ref 3.8–10.5)

## 2023-06-27 PROCEDURE — 92978 ENDOLUMINL IVUS OCT C 1ST: CPT | Mod: 26,RC

## 2023-06-27 PROCEDURE — 93010 ELECTROCARDIOGRAM REPORT: CPT | Mod: 76,77

## 2023-06-27 PROCEDURE — 93458 L HRT ARTERY/VENTRICLE ANGIO: CPT | Mod: 26,59

## 2023-06-27 PROCEDURE — 99285 EMERGENCY DEPT VISIT HI MDM: CPT

## 2023-06-27 PROCEDURE — 99152 MOD SED SAME PHYS/QHP 5/>YRS: CPT

## 2023-06-27 PROCEDURE — 99291 CRITICAL CARE FIRST HOUR: CPT

## 2023-06-27 PROCEDURE — 93010 ELECTROCARDIOGRAM REPORT: CPT

## 2023-06-27 PROCEDURE — 92929: CPT | Mod: RC

## 2023-06-27 PROCEDURE — 71045 X-RAY EXAM CHEST 1 VIEW: CPT | Mod: 26

## 2023-06-27 PROCEDURE — 92928 PRQ TCAT PLMT NTRAC ST 1 LES: CPT | Mod: RC

## 2023-06-27 PROCEDURE — 99223 1ST HOSP IP/OBS HIGH 75: CPT | Mod: GC

## 2023-06-27 RX ORDER — GLUCAGON INJECTION, SOLUTION 0.5 MG/.1ML
1 INJECTION, SOLUTION SUBCUTANEOUS ONCE
Refills: 0 | Status: DISCONTINUED | OUTPATIENT
Start: 2023-06-27 | End: 2023-06-30

## 2023-06-27 RX ORDER — MORPHINE SULFATE 50 MG/1
2 CAPSULE, EXTENDED RELEASE ORAL ONCE
Refills: 0 | Status: DISCONTINUED | OUTPATIENT
Start: 2023-06-27 | End: 2023-06-27

## 2023-06-27 RX ORDER — HEPARIN SODIUM 5000 [USP'U]/ML
INJECTION INTRAVENOUS; SUBCUTANEOUS
Qty: 25000 | Refills: 0 | Status: DISCONTINUED | OUTPATIENT
Start: 2023-06-27 | End: 2023-06-27

## 2023-06-27 RX ORDER — DEXTROSE 50 % IN WATER 50 %
12.5 SYRINGE (ML) INTRAVENOUS ONCE
Refills: 0 | Status: DISCONTINUED | OUTPATIENT
Start: 2023-06-27 | End: 2023-06-30

## 2023-06-27 RX ORDER — METFORMIN HYDROCHLORIDE 850 MG/1
1 TABLET ORAL
Refills: 0 | DISCHARGE

## 2023-06-27 RX ORDER — INSULIN HUMAN 100 [IU]/ML
6 INJECTION, SOLUTION SUBCUTANEOUS ONCE
Refills: 0 | Status: COMPLETED | OUTPATIENT
Start: 2023-06-27 | End: 2023-06-27

## 2023-06-27 RX ORDER — ASPIRIN/CALCIUM CARB/MAGNESIUM 324 MG
324 TABLET ORAL ONCE
Refills: 0 | Status: COMPLETED | OUTPATIENT
Start: 2023-06-27 | End: 2023-06-27

## 2023-06-27 RX ORDER — INSULIN LISPRO 100/ML
5 VIAL (ML) SUBCUTANEOUS
Refills: 0 | Status: DISCONTINUED | OUTPATIENT
Start: 2023-06-27 | End: 2023-06-30

## 2023-06-27 RX ORDER — HEPARIN SODIUM 5000 [USP'U]/ML
4100 INJECTION INTRAVENOUS; SUBCUTANEOUS EVERY 6 HOURS
Refills: 0 | Status: DISCONTINUED | OUTPATIENT
Start: 2023-06-27 | End: 2023-06-28

## 2023-06-27 RX ORDER — INSULIN GLARGINE 100 [IU]/ML
15 INJECTION, SOLUTION SUBCUTANEOUS AT BEDTIME
Refills: 0 | Status: DISCONTINUED | OUTPATIENT
Start: 2023-06-27 | End: 2023-06-29

## 2023-06-27 RX ORDER — ALBUTEROL 90 UG/1
2 AEROSOL, METERED ORAL
Qty: 0 | Refills: 0 | DISCHARGE

## 2023-06-27 RX ORDER — LOSARTAN POTASSIUM 100 MG/1
1 TABLET, FILM COATED ORAL
Refills: 0 | DISCHARGE

## 2023-06-27 RX ORDER — INSULIN LISPRO 100/ML
0 VIAL (ML) SUBCUTANEOUS
Qty: 0 | Refills: 0 | DISCHARGE

## 2023-06-27 RX ORDER — GABAPENTIN 400 MG/1
300 CAPSULE ORAL THREE TIMES A DAY
Refills: 0 | Status: DISCONTINUED | OUTPATIENT
Start: 2023-06-27 | End: 2023-06-30

## 2023-06-27 RX ORDER — ATORVASTATIN CALCIUM 80 MG/1
40 TABLET, FILM COATED ORAL AT BEDTIME
Refills: 0 | Status: DISCONTINUED | OUTPATIENT
Start: 2023-06-27 | End: 2023-06-27

## 2023-06-27 RX ORDER — SODIUM CHLORIDE 9 MG/ML
1000 INJECTION, SOLUTION INTRAVENOUS
Refills: 0 | Status: DISCONTINUED | OUTPATIENT
Start: 2023-06-27 | End: 2023-06-30

## 2023-06-27 RX ORDER — HEPARIN SODIUM 5000 [USP'U]/ML
4100 INJECTION INTRAVENOUS; SUBCUTANEOUS ONCE
Refills: 0 | Status: COMPLETED | OUTPATIENT
Start: 2023-06-27 | End: 2023-06-27

## 2023-06-27 RX ORDER — ACETAMINOPHEN 500 MG
650 TABLET ORAL EVERY 6 HOURS
Refills: 0 | Status: DISCONTINUED | OUTPATIENT
Start: 2023-06-27 | End: 2023-06-30

## 2023-06-27 RX ORDER — SODIUM CHLORIDE 9 MG/ML
250 INJECTION INTRAMUSCULAR; INTRAVENOUS; SUBCUTANEOUS ONCE
Refills: 0 | Status: COMPLETED | OUTPATIENT
Start: 2023-06-27 | End: 2023-06-27

## 2023-06-27 RX ORDER — SODIUM CHLORIDE 9 MG/ML
500 INJECTION INTRAMUSCULAR; INTRAVENOUS; SUBCUTANEOUS
Refills: 0 | Status: DISCONTINUED | OUTPATIENT
Start: 2023-06-27 | End: 2023-06-28

## 2023-06-27 RX ORDER — INSULIN GLARGINE 100 [IU]/ML
40 INJECTION, SOLUTION SUBCUTANEOUS
Qty: 0 | Refills: 0 | DISCHARGE

## 2023-06-27 RX ORDER — METOPROLOL TARTRATE 50 MG
25 TABLET ORAL ONCE
Refills: 0 | Status: COMPLETED | OUTPATIENT
Start: 2023-06-27 | End: 2023-06-27

## 2023-06-27 RX ORDER — GABAPENTIN 400 MG/1
1 CAPSULE ORAL
Refills: 0 | DISCHARGE

## 2023-06-27 RX ORDER — ATORVASTATIN CALCIUM 80 MG/1
80 TABLET, FILM COATED ORAL AT BEDTIME
Refills: 0 | Status: DISCONTINUED | OUTPATIENT
Start: 2023-06-27 | End: 2023-06-30

## 2023-06-27 RX ORDER — TICAGRELOR 90 MG/1
90 TABLET ORAL EVERY 12 HOURS
Refills: 0 | Status: DISCONTINUED | OUTPATIENT
Start: 2023-06-27 | End: 2023-06-30

## 2023-06-27 RX ORDER — SODIUM CHLORIDE 9 MG/ML
1000 INJECTION INTRAMUSCULAR; INTRAVENOUS; SUBCUTANEOUS
Refills: 0 | Status: DISCONTINUED | OUTPATIENT
Start: 2023-06-27 | End: 2023-06-27

## 2023-06-27 RX ORDER — DEXTROSE 50 % IN WATER 50 %
15 SYRINGE (ML) INTRAVENOUS ONCE
Refills: 0 | Status: DISCONTINUED | OUTPATIENT
Start: 2023-06-27 | End: 2023-06-30

## 2023-06-27 RX ORDER — DEXTROSE 50 % IN WATER 50 %
25 SYRINGE (ML) INTRAVENOUS ONCE
Refills: 0 | Status: DISCONTINUED | OUTPATIENT
Start: 2023-06-27 | End: 2023-06-30

## 2023-06-27 RX ORDER — METOPROLOL TARTRATE 50 MG
25 TABLET ORAL EVERY 12 HOURS
Refills: 0 | Status: DISCONTINUED | OUTPATIENT
Start: 2023-06-27 | End: 2023-06-30

## 2023-06-27 RX ORDER — ICOSAPENT ETHYL 500 MG/1
2 CAPSULE, LIQUID FILLED ORAL
Qty: 0 | Refills: 0 | DISCHARGE

## 2023-06-27 RX ORDER — INSULIN LISPRO 100/ML
VIAL (ML) SUBCUTANEOUS AT BEDTIME
Refills: 0 | Status: DISCONTINUED | OUTPATIENT
Start: 2023-06-27 | End: 2023-06-30

## 2023-06-27 RX ORDER — SODIUM CHLORIDE 9 MG/ML
500 INJECTION INTRAMUSCULAR; INTRAVENOUS; SUBCUTANEOUS
Refills: 0 | Status: DISCONTINUED | OUTPATIENT
Start: 2023-06-27 | End: 2023-06-27

## 2023-06-27 RX ORDER — ASPIRIN/CALCIUM CARB/MAGNESIUM 324 MG
81 TABLET ORAL DAILY
Refills: 0 | Status: DISCONTINUED | OUTPATIENT
Start: 2023-06-27 | End: 2023-06-28

## 2023-06-27 RX ORDER — HEPARIN SODIUM 5000 [USP'U]/ML
4100 INJECTION INTRAVENOUS; SUBCUTANEOUS EVERY 6 HOURS
Refills: 0 | Status: DISCONTINUED | OUTPATIENT
Start: 2023-06-27 | End: 2023-06-27

## 2023-06-27 RX ORDER — INSULIN LISPRO 100/ML
VIAL (ML) SUBCUTANEOUS EVERY 6 HOURS
Refills: 0 | Status: DISCONTINUED | OUTPATIENT
Start: 2023-06-27 | End: 2023-06-27

## 2023-06-27 RX ORDER — HEPARIN SODIUM 5000 [USP'U]/ML
INJECTION INTRAVENOUS; SUBCUTANEOUS
Qty: 25000 | Refills: 0 | Status: DISCONTINUED | OUTPATIENT
Start: 2023-06-27 | End: 2023-06-28

## 2023-06-27 RX ORDER — INSULIN LISPRO 100/ML
VIAL (ML) SUBCUTANEOUS
Refills: 0 | Status: DISCONTINUED | OUTPATIENT
Start: 2023-06-27 | End: 2023-06-30

## 2023-06-27 RX ORDER — LOSARTAN POTASSIUM 100 MG/1
25 TABLET, FILM COATED ORAL DAILY
Refills: 0 | Status: DISCONTINUED | OUTPATIENT
Start: 2023-06-27 | End: 2023-06-30

## 2023-06-27 RX ADMIN — INSULIN HUMAN 6 UNIT(S): 100 INJECTION, SOLUTION SUBCUTANEOUS at 09:27

## 2023-06-27 RX ADMIN — MORPHINE SULFATE 2 MILLIGRAM(S): 50 CAPSULE, EXTENDED RELEASE ORAL at 23:24

## 2023-06-27 RX ADMIN — GABAPENTIN 300 MILLIGRAM(S): 400 CAPSULE ORAL at 22:36

## 2023-06-27 RX ADMIN — HEPARIN SODIUM 800 UNIT(S)/HR: 5000 INJECTION INTRAVENOUS; SUBCUTANEOUS at 09:28

## 2023-06-27 RX ADMIN — TICAGRELOR 90 MILLIGRAM(S): 90 TABLET ORAL at 17:05

## 2023-06-27 RX ADMIN — MORPHINE SULFATE 2 MILLIGRAM(S): 50 CAPSULE, EXTENDED RELEASE ORAL at 23:39

## 2023-06-27 RX ADMIN — Medication 25 MILLIGRAM(S): at 13:48

## 2023-06-27 RX ADMIN — HEPARIN SODIUM 800 UNIT(S)/HR: 5000 INJECTION INTRAVENOUS; SUBCUTANEOUS at 23:58

## 2023-06-27 RX ADMIN — Medication 324 MILLIGRAM(S): at 06:21

## 2023-06-27 RX ADMIN — Medication 650 MILLIGRAM(S): at 23:26

## 2023-06-27 RX ADMIN — SODIUM CHLORIDE 100 MILLILITER(S): 9 INJECTION INTRAMUSCULAR; INTRAVENOUS; SUBCUTANEOUS at 13:00

## 2023-06-27 RX ADMIN — SODIUM CHLORIDE 100 MILLILITER(S): 9 INJECTION INTRAMUSCULAR; INTRAVENOUS; SUBCUTANEOUS at 14:44

## 2023-06-27 RX ADMIN — ATORVASTATIN CALCIUM 80 MILLIGRAM(S): 80 TABLET, FILM COATED ORAL at 22:36

## 2023-06-27 RX ADMIN — SODIUM CHLORIDE 500 MILLILITER(S): 9 INJECTION INTRAMUSCULAR; INTRAVENOUS; SUBCUTANEOUS at 10:25

## 2023-06-27 RX ADMIN — GABAPENTIN 300 MILLIGRAM(S): 400 CAPSULE ORAL at 14:43

## 2023-06-27 RX ADMIN — Medication 650 MILLIGRAM(S): at 22:56

## 2023-06-27 RX ADMIN — Medication 25 MILLIGRAM(S): at 17:05

## 2023-06-27 RX ADMIN — Medication 4: at 17:06

## 2023-06-27 RX ADMIN — HEPARIN SODIUM 4100 UNIT(S): 5000 INJECTION INTRAVENOUS; SUBCUTANEOUS at 09:29

## 2023-06-27 RX ADMIN — Medication 5 UNIT(S): at 17:05

## 2023-06-27 RX ADMIN — INSULIN GLARGINE 15 UNIT(S): 100 INJECTION, SOLUTION SUBCUTANEOUS at 22:37

## 2023-06-27 NOTE — H&P ADULT - PROBLEM SELECTOR PLAN 3
Chronic  - Patient takes Lantus 35u qhs, Admelog 10u TID premeal, Metformin 1000mg BID, Januvia 50mg  - Hold oral hypoglycemics inpatient  - Start moderate dose ISS, Lantus ***  - Accuchecks and hypoglycemia protocol  - F/u AM A1c Chronic  - Patient takes Lantus 30u qhs, Admelog 10u TID premeal, Januvia 50mg. Stopped taking metformin 2/2 GI side effects  - Hold oral hypoglycemics inpatient  - Start low dose ISS, Lantus 15 qhs. Hold premeal while NPO. Titrate up as needed  - Accuchecks and hypoglycemia protocol  - F/u AM A1c

## 2023-06-27 NOTE — H&P ADULT - NSHPSOCIALHISTORY_GEN_ALL_CORE
Tobacco Usage:  never smoked   Alcohol Usage: denies  Substance Use: denies  Lives with: family at home  ADLs: independent  Vaccinations: COVID and flu vaccinated

## 2023-06-27 NOTE — ED PROVIDER NOTE - PROGRESS NOTE DETAILS
Rpt trop elevated  Rpt EKG unchanged.  Pt has had no chest pain while being in the ED.   ordered  AM cardio consult requested.  Pt is resting comfortably received signout from Dr. Sutton. rising trop. heparin ordered. case d/w Dr. Harmon for cardio consult. for hospitalist admission. updated patient with . (Jeane).

## 2023-06-27 NOTE — ED ADULT NURSE REASSESSMENT NOTE - NSFALLUNIVINTERV_ED_ALL_ED
Bed/Stretcher in lowest position, wheels locked, appropriate side rails in place/Call bell, personal items and telephone in reach/Instruct patient to call for assistance before getting out of bed/chair/stretcher/Non-slip footwear applied when patient is off stretcher/[place] to call system/Physically safe environment - no spills, clutter or unnecessary equipment/Purposeful proactive rounding/Room/bathroom lighting operational, light cord in reach

## 2023-06-27 NOTE — ED ADULT NURSE NOTE - CHIEF COMPLAINT QUOTE
52 yr old male c/o chest pain since today. [place] speaking only. Reports it started as neck pain at 12 noon and moved to the Chest approx pm.

## 2023-06-27 NOTE — CONSULT NOTE ADULT - ASSESSMENT
Pt is 51 yo male with PMH of T2DM, HLD, who presented for chest pain. Admitted for NSTEMI, cardiology consulted.    * Ischemia/NSTEMI   - Pt with typical chest pain last night that lasted ~1h   - EKG with NSR, non specific abnormalities in T waves. No ST abnormalities seen, no changes from initial to 2nd EKG.   - Troponin 14.3 uptrend to 310.4 after 3hs.   - Pt is at high risk for CAD due to uncontrolled DM and HLD. ROS positive for 3 months of CRAMER associated to diaphoresis and pt reports abnormal stress test ~2m ago.    - Angio-Cath indicated at this time   - Information provided to the patient at bedside in [place]. Pt agrees with procedure.      * Blood pressure  - Denies h/o HTN  - Pt is on Losartan 25mg qd at home. He was told that was started as a preventive measure in the setting of DM  - , 145 in the hospital   - Monitor BP after procedure.     * Volume   - No h/o CHF  - Echo outpatient ~4m ago "normal"   - No signs of volume overload on exam.   - No acute concerns     * Arrhythmia   - EKG with NSR  - No known h/o cardiac arrhythmia.      Pt is 53 yo male with PMH of T2DM, HLD, who presented for chest pain. Admitted for NSTEMI, cardiology consulted.    * Ischemia/NSTEMI   - Pt with typical chest pain last night that lasted ~1h   - EKG with NSR, non specific abnormalities in T waves. No ST abnormalities seen, no changes from initial to 2nd EKG.   - Troponin 14.3 uptrend to 310.4 after 3hs.   - Currently on heparin drip, continue.   - Pt is at high risk for CAD due to uncontrolled DM and HLD. ROS positive for 3 months of CRAMER associated to diaphoresis and pt reports abnormal stress test ~2m ago.    - Angio-Cath indicated at this time   - Information provided to the patient at bedside in [place]ian. Pt agrees with procedure.      * Blood pressure  - Denies h/o HTN  - Pt is on Losartan 25mg qd at home. He was told that was started as a preventive measure in the setting of DM  - , 145 in the hospital   - Monitor BP after procedure.     * Volume   - No h/o CHF  - Echo outpatient ~4m ago "normal"   - No signs of volume overload on exam.   - No acute concerns     * Arrhythmia   - EKG with NSR  - No known h/o cardiac arrhythmia.

## 2023-06-27 NOTE — ED ADULT NURSE NOTE - OBJECTIVE STATEMENT
52 yr old male came to the ED for c/o chest pain since today.  pt stated that started with a neck pain at 12 noon and moved to the Chest at night.

## 2023-06-27 NOTE — H&P ADULT - PROBLEM SELECTOR PLAN 4
- VTE ppx: pt on hep gtt Chronic  - Continue statin inpatient Chronic, on pravastatin 80mg at home  - Continue atorvastatin 40mg inpatient

## 2023-06-27 NOTE — ED ADULT NURSE REASSESSMENT NOTE - NS ED NURSE REASSESS COMMENT FT1
Pt waiting for cardiac consult. Pt denies CP at this time. Pt in no acute distress. Pt updated on plan of care.

## 2023-06-27 NOTE — CHART NOTE - NSCHARTNOTEFT_GEN_A_CORE
Department of Cardiology                                                                     Division of Interventional Cardiology                                                                  [facility]/ [address] [address]                                                                             Telephone: [phone]                                                    Post- Procedure Note: Left Heart Cardiac Catheterization       52 year old male with PMH HTN, HLD, DM presented with CP. Admitted with NSTEMI. Now s/p LHC (multivessel CAD) with MARCK to RPL (90% stenosis) and mRCA (80% stenosis). For staged PCI to LCA 6/28.    Subjective/ROS: sleepy but arousable, no c/o offered. Denies CP, palpitations, SOB, N/V, fever/chills, dizziness, weakness, numbness/tingling.      HPI:  51yo male with PMH of T2DM on insulin, HTN, HLD who presents to the ED with posterior neck pain radiating to the left chest.  used, ID# [id number]. Pt states around noon yesterday he developed posterior neck pain radiating up to the back of his head. Around 3pm he began to have left sided chest pain while sedentary, intermittent, non-radiating, resolving within a few seconds. Last night around 9pm he developed worsening left sided chest pain with radiation to his neck and left arm, along with diaphoresis and SOB, described it as a choking sensation in his chest/throat. Pt notes he has been having dyspnea on exertion for the past 3mos, went to see his cardiologist and had a stress test done 2 mos ago that he was unable to complete due to dyspnea, was told the test was abnormal but unsure of what the follow up recommendations were. He reports no history of smoking. During time of evaluation patient still reporting mild left sided chest pain, though improved.    In the ED:  Vitals: Tmax 98.1F | HR 86 | /81> 131/79 | RR 18 | SpO2 98% on RA  Labs: trop 14.3 > 310.4, glucose 368, Na 133  CXR: on wet read, grossly clear lungs  ECG: NSR, rate 83, LAD  Received ASA 324mg, heparin gtt, 6u Humulin (27 Jun 2023 08:36)      PAST MEDICAL & SURGICAL HISTORY:  Diabetes mellitus  Hypertension  Hypercholesteremia  No significant past surgical history      MEDICATIONS  (STANDING):  aspirin  chewable 81 milliGRAM(s) Oral daily  atorvastatin 80 milliGRAM(s) Oral at bedtime  gabapentin 300 milliGRAM(s) Oral three times a day  insulin glargine Injectable (LANTUS) 15 Unit(s) SubCutaneous at bedtime  insulin lispro (ADMELOG) corrective regimen sliding scale   SubCutaneous every 6 hours  losartan 25 milliGRAM(s) Oral daily  metoprolol tartrate 25 milliGRAM(s) Oral every 12 hours  metoprolol tartrate 25 milliGRAM(s) Oral once now  sodium chloride 0.9%. 500 milliLiter(s) (100 mL/Hr) IV Continuous   ticagrelor 90 milliGRAM(s) Oral every 12 hours    MEDICATIONS  (PRN):  acetaminophen     Tablet .. 650 milliGRAM(s) Oral every 6 hours PRN Temp greater or equal to 38C (100.4F), Mild Pain (1 - 3)  dextrose Oral Gel 15 Gram(s) Oral once PRN Blood Glucose LESS THAN 70 milliGRAM(s)/deciliter    No Known Allergies                          13.9   8.00  )-----------( 180      ( 27 Jun 2023 02:34 )             40.1     06-27    133<L>  |  101  |  22  ----------------------------<  368<H>  4.1   |  29  |  1.10    Ca    9.0      27 Jun 2023 02:34    TPro  6.7  /  Alb  3.3  /  TBili  0.3  /  DBili  x   /  AST  16  /  ALT  25  /  AlkPhos  88  06-27      Vital Signs Last 24 Hrs  T(C): 36.8 (27 Jun 2023 10:18), Max: 36.8 (27 Jun 2023 10:18)  T(F): 98.2 (27 Jun 2023 10:18), Max: 98.2 (27 Jun 2023 10:18)  HR: 61 (27 Jun 2023 12:15) (61 - 86), NSR  BP: 134/76 (27 Jun 2023 12:15) (127/73 - 150/81)  BP(mean): 98 (27 Jun 2023 12:15) (93 - 98)  RR: 13 (27 Jun 2023 12:15) (12 - 20)  SpO2: 97% (27 Jun 2023 12:15) (97% - 98%)    Parameters below as of 27 Jun 2023 12:05  Patient On (Oxygen Delivery Method): room air      Post PCI ECG:       Constitutional: NAD  Neuro: sleepy but arousable, A+O x 3, non-focal, speech clear and intact  HEENT: NC/AT, PERRL, EOMI, anicteric sclerae, oral mucosa pink and moist  Neck: supple, no JVD  CV: regular rate, regular rhythm, +S1S2, no murmurs or rub  Pulm/chest: lung sounds CTA and equal bilaterally, no accessory muscle use noted  Abd: soft, NT, ND, +BS  Ext: KAUR x 4, no C/C/E  Access site: R wrist C/D/I/soft without hematoma, distal motor/neuro/circ intact. R radial pulse 2+.  Skin: warm, well perfused  Psych: calm, appropriate affect      A/P: 52 year old male with PMH HTN, HLD, DM presented with CP. Admitted with NSTEMI. Now s/p LHC (multivessel CAD) with MARCK to RPL (90% stenosis) and mRCA (80% stenosis). For staged PCI to LCA 6/28.      Admit to CPU for overnight observation post NSTEMI / PCI  post cath/PCI routine VS, access site, neuro-vascular monitoring and RUE post access precautions ordered  post cath hydration as ordered  Bedrest. May get OOB 30 minutes after radial band removed if wrist and hemodynamics remain stable   EKG post cath done  f/u labs and EKG in am  loaded with ticagrelor in cath lab  continue dual anti platelet therapy with aspirin AND ticagrelor  Pt education provided/reinforced re: importance of strict adherence to uninterrupted DAPT for minimum of 3-12 months (cardiologist will determine duration)  cardiac rehab info provided/referral and communication to cardiac rehab to be sent tomorrow  statin increased to high intensity, beta-bloaker added, continue ARB  may resume prior diet  Lifestyle modifications discussed to reduce cardiovascular risk factors including weight reduction, smoking cessation (referral provided if applicable), medication compliance, and routine follow up with Cardiologist to track your BMI, cholesterol, and glucose levels.   for staged PCI to LCA  follow-up on with Dr Bunn for post PCI check end of this week/beginning of next week  follow-up in 2 weeks with outpatient/referring cardiologist  continue home medication regimen as appropriate  DM regimen and FS per medicine team and CDE consulted  remainder of plan per primary team  above d/w hospitalist by Dr Bunn Department of Cardiology                                                                     Division of Interventional Cardiology                                                                  [facility]/ [address] [address]                                                                             Telephone: [phone]                                                    Post- Procedure Note: Left Heart Cardiac Catheterization       52 year old male with PMH HTN, HLD, DM presented with CP. Admitted with NSTEMI.   Now s/p LHC (multivessel CAD, low syntax score) with MARCK to RPL (90% stenosis) and mRCA (80% stenosis). For staged PCI to LAD and Circ 6/28.      Subjective/ROS: sleepy but arousable, no c/o offered. Denies CP, palpitations, SOB, N/V, fever/chills, dizziness, weakness, numbness/tingling.      HPI:  53yo male with PMH of T2DM on insulin, HTN, HLD who presents to the ED with posterior neck pain radiating to the left chest.  used, ID# [id number]. Pt states around noon yesterday he developed posterior neck pain radiating up to the back of his head. Around 3pm he began to have left sided chest pain while sedentary, intermittent, non-radiating, resolving within a few seconds. Last night around 9pm he developed worsening left sided chest pain with radiation to his neck and left arm, along with diaphoresis and SOB, described it as a choking sensation in his chest/throat. Pt notes he has been having dyspnea on exertion for the past 3mos, went to see his cardiologist and had a stress test done 2 mos ago that he was unable to complete due to dyspnea, was told the test was abnormal but unsure of what the follow up recommendations were. He reports no history of smoking. During time of evaluation patient still reporting mild left sided chest pain, though improved.    In the ED:  Vitals: Tmax 98.1F | HR 86 | /81> 131/79 | RR 18 | SpO2 98% on RA  Labs: trop 14.3 > 310.4, glucose 368, Na 133  CXR: on wet read, grossly clear lungs  ECG: NSR, rate 83, LAD  Received ASA 324mg, heparin gtt, 6u Humulin (27 Jun 2023 08:36)      PAST MEDICAL & SURGICAL HISTORY:  Diabetes mellitus  Hypertension  Hypercholesteremia  No significant past surgical history      MEDICATIONS  (STANDING):  aspirin  chewable 81 milliGRAM(s) Oral daily  atorvastatin 80 milliGRAM(s) Oral at bedtime  gabapentin 300 milliGRAM(s) Oral three times a day  insulin glargine Injectable (LANTUS) 15 Unit(s) SubCutaneous at bedtime  insulin lispro (ADMELOG) corrective regimen sliding scale   SubCutaneous every 6 hours  losartan 25 milliGRAM(s) Oral daily  metoprolol tartrate 25 milliGRAM(s) Oral every 12 hours  metoprolol tartrate 25 milliGRAM(s) Oral once now  sodium chloride 0.9%. 500 milliLiter(s) (100 mL/Hr) IV Continuous   ticagrelor 90 milliGRAM(s) Oral every 12 hours    MEDICATIONS  (PRN):  acetaminophen     Tablet .. 650 milliGRAM(s) Oral every 6 hours PRN Temp greater or equal to 38C (100.4F), Mild Pain (1 - 3)  dextrose Oral Gel 15 Gram(s) Oral once PRN Blood Glucose LESS THAN 70 milliGRAM(s)/deciliter    No Known Allergies                          13.9   8.00  )-----------( 180      ( 27 Jun 2023 02:34 )             40.1     06-27    133<L>  |  101  |  22  ----------------------------<  368<H>  4.1   |  29  |  1.10    Ca    9.0      27 Jun 2023 02:34    TPro  6.7  /  Alb  3.3  /  TBili  0.3  /  DBili  x   /  AST  16  /  ALT  25  /  AlkPhos  88  06-27      Vital Signs Last 24 Hrs  T(C): 36.8 (27 Jun 2023 10:18), Max: 36.8 (27 Jun 2023 10:18)  T(F): 98.2 (27 Jun 2023 10:18), Max: 98.2 (27 Jun 2023 10:18)  HR: 61 (27 Jun 2023 12:15) (61 - 86), NSR  BP: 134/76 (27 Jun 2023 12:15) (127/73 - 150/81)  BP(mean): 98 (27 Jun 2023 12:15) (93 - 98)  RR: 13 (27 Jun 2023 12:15) (12 - 20)  SpO2: 97% (27 Jun 2023 12:15) (97% - 98%)    Parameters below as of 27 Jun 2023 12:05  Patient On (Oxygen Delivery Method): room air      Post PCI ECG:       Constitutional: NAD  Neuro: sleepy but arousable, A+O x 3, non-focal, speech clear and intact  HEENT: NC/AT, PERRL, EOMI, anicteric sclerae, oral mucosa pink and moist  Neck: supple, no JVD  CV: regular rate, regular rhythm, +S1S2, no murmurs or rub  Pulm/chest: lung sounds CTA and equal bilaterally, no accessory muscle use noted  Abd: soft, NT, ND, +BS  Ext: KAUR x 4, no C/C/E  Access site: R wrist C/D/I/soft without hematoma, distal motor/neuro/circ intact. R radial pulse 2+.  Skin: warm, well perfused  Psych: calm, appropriate affect      A/P: 52 year old male with PMH HTN, HLD, DM presented with CP. Admitted with NSTEMI. Now s/p LHC (multivessel CAD, low syntax score) with MARCK to RPL (90% stenosis) and mRCA (80% stenosis). For staged PCI to LAD and circ 6/28.      Admit to CPU for overnight observation post NSTEMI / PCI  post cath/PCI routine VS, access site, neuro-vascular monitoring and RUE post access precautions ordered  post cath hydration as ordered  Bedrest. May get OOB 30 minutes after radial band removed if wrist and hemodynamics remain stable   EKG post cath done  f/u labs and EKG in am  loaded with ticagrelor in cath lab  continue dual anti platelet therapy with aspirin AND ticagrelor  Pt education provided/reinforced re: importance of strict adherence to uninterrupted DAPT for minimum of 3-12 months (cardiologist will determine duration)  cardiac rehab info provided/referral and communication to cardiac rehab to be sent tomorrow  statin increased to high intensity, beta-bloaker added, continue ARB  may resume prior diet  Lifestyle modifications discussed to reduce cardiovascular risk factors including weight reduction, smoking cessation (referral provided if applicable), medication compliance, and routine follow up with Cardiologist to track your BMI, cholesterol, and glucose levels.   for staged PCI to LCA  follow-up on with Dr Bunn for post PCI check end of this week/beginning of next week  follow-up in 2 weeks with outpatient/referring cardiologist  continue home medication regimen as appropriate  DM regimen and FS per medicine team and CDE consulted  remainder of plan per primary team  above d/w hospitalist by Dr Bunn Department of Cardiology                                                                     Division of Interventional Cardiology                                                                  [facility]/ [address] [address]                                                                             Telephone: [phone]                                                    Post- Procedure Note: Left Heart Cardiac Catheterization       52 year old male with PMH HTN, HLD, DM presented with CP. Admitted with NSTEMI.   Now s/p LHC (multivessel CAD, low syntax score) with MARCK to RPL (90% stenosis) and mRCA (80% stenosis). For staged PCI to LAD and Circ 6/28.      Subjective/ROS: sleepy but arousable, no c/o offered. Denies CP, palpitations, SOB, N/V, fever/chills, dizziness, weakness, numbness/tingling.      HPI:  53yo male with PMH of T2DM on insulin, HTN, HLD who presents to the ED with posterior neck pain radiating to the left chest.  used, ID# [id number]. Pt states around noon yesterday he developed posterior neck pain radiating up to the back of his head. Around 3pm he began to have left sided chest pain while sedentary, intermittent, non-radiating, resolving within a few seconds. Last night around 9pm he developed worsening left sided chest pain with radiation to his neck and left arm, along with diaphoresis and SOB, described it as a choking sensation in his chest/throat. Pt notes he has been having dyspnea on exertion for the past 3mos, went to see his cardiologist and had a stress test done 2 mos ago that he was unable to complete due to dyspnea, was told the test was abnormal but unsure of what the follow up recommendations were. He reports no history of smoking. During time of evaluation patient still reporting mild left sided chest pain, though improved.    In the ED:  Vitals: Tmax 98.1F | HR 86 | /81> 131/79 | RR 18 | SpO2 98% on RA  Labs: trop 14.3 > 310.4, glucose 368, Na 133  CXR: on wet read, grossly clear lungs  ECG: NSR, rate 83, LAD  Received ASA 324mg, heparin gtt, 6u Humulin (27 Jun 2023 08:36)      PAST MEDICAL & SURGICAL HISTORY:  Diabetes mellitus  Hypertension  Hypercholesteremia  No significant past surgical history      MEDICATIONS  (STANDING):  aspirin  chewable 81 milliGRAM(s) Oral daily  atorvastatin 80 milliGRAM(s) Oral at bedtime  gabapentin 300 milliGRAM(s) Oral three times a day  insulin glargine Injectable (LANTUS) 15 Unit(s) SubCutaneous at bedtime  insulin lispro (ADMELOG) corrective regimen sliding scale   SubCutaneous every 6 hours  losartan 25 milliGRAM(s) Oral daily  metoprolol tartrate 25 milliGRAM(s) Oral every 12 hours  metoprolol tartrate 25 milliGRAM(s) Oral once now  sodium chloride 0.9%. 500 milliLiter(s) (100 mL/Hr) IV Continuous   ticagrelor 90 milliGRAM(s) Oral every 12 hours    MEDICATIONS  (PRN):  acetaminophen     Tablet .. 650 milliGRAM(s) Oral every 6 hours PRN Temp greater or equal to 38C (100.4F), Mild Pain (1 - 3)  dextrose Oral Gel 15 Gram(s) Oral once PRN Blood Glucose LESS THAN 70 milliGRAM(s)/deciliter    No Known Allergies                          13.9   8.00  )-----------( 180      ( 27 Jun 2023 02:34 )             40.1     06-27    133<L>  |  101  |  22  ----------------------------<  368<H>  4.1   |  29  |  1.10    Ca    9.0      27 Jun 2023 02:34    TPro  6.7  /  Alb  3.3  /  TBili  0.3  /  DBili  x   /  AST  16  /  ALT  25  /  AlkPhos  88  06-27      Vital Signs Last 24 Hrs  T(C): 36.8 (27 Jun 2023 10:18), Max: 36.8 (27 Jun 2023 10:18)  T(F): 98.2 (27 Jun 2023 10:18), Max: 98.2 (27 Jun 2023 10:18)  HR: 61 (27 Jun 2023 12:15) (61 - 86), NSR  BP: 134/76 (27 Jun 2023 12:15) (127/73 - 150/81)  BP(mean): 98 (27 Jun 2023 12:15) (93 - 98)  RR: 13 (27 Jun 2023 12:15) (12 - 20)  SpO2: 97% (27 Jun 2023 12:15) (97% - 98%)    Parameters below as of 27 Jun 2023 12:05  Patient On (Oxygen Delivery Method): room air      Post PCI ECG:       Constitutional: NAD  Neuro: sleepy but arousable, A+O x 3, non-focal, speech clear and intact  HEENT: NC/AT, PERRL, EOMI, anicteric sclerae, oral mucosa pink and moist  Neck: supple, no JVD  CV: regular rate, regular rhythm, +S1S2, no murmurs or rub  Pulm/chest: lung sounds CTA and equal bilaterally, no accessory muscle use noted  Abd: soft, NT, ND, +BS  Ext: KAUR x 4, no C/C/E  Access site: R wrist C/D/I/soft without hematoma, distal motor/neuro/circ intact. R radial pulse 2+.  Skin: warm, well perfused  Psych: calm, appropriate affect      A/P: 52 year old male with PMH HTN, HLD, DM presented with CP. Admitted with NSTEMI. Now s/p LHC (multivessel CAD, low syntax score) with MARCK to RPL (90% stenosis) and mRCA (80% stenosis). For staged PCI to LAD and circ 6/28.      Admit to CPU for overnight observation post NSTEMI / PCI  post cath/PCI routine VS, access site, neuro-vascular monitoring and RUE post access precautions ordered  post cath hydration as ordered  Bedrest. May get OOB 30 minutes after radial band removed if wrist and hemodynamics remain stable   EKG post cath done  f/u labs and EKG in am  loaded with ticagrelor in cath lab  continue dual anti platelet therapy with aspirin AND ticagrelor     *[facility] in [place] called, 90 day Rx for ticagrelor reported to have $1 copay per pharmacist  Pt education provided/reinforced re: importance of strict adherence to uninterrupted DAPT for minimum of 3-12 months (cardiologist will determine duration)  cardiac rehab info provided/referral and communication to cardiac rehab to be sent tomorrow  statin increased to high intensity, beta-blocker added, continue ARB  may resume prior diet  Lifestyle modifications discussed to reduce cardiovascular risk factors including weight reduction, smoking cessation (referral provided if applicable), medication compliance, and routine follow up with Cardiologist to track your BMI, cholesterol, and glucose levels.   for staged PCI to LCA  follow-up on with Dr Bunn for post PCI check end of this week/beginning of next week  follow-up in 2 weeks with outpatient/referring cardiologist  continue home medication regimen as appropriate  DM regimen and FS per medicine team and CDE consulted  remainder of plan per primary team  above d/w hospitalist by Dr Bunn

## 2023-06-27 NOTE — CHART NOTE - NSCHARTNOTEFT_GEN_A_CORE
52 year old male with PMH HTN, HLD, DM presented with CP. Admitted with NSTEMI. Now s/p LHC (multivessel CAD, low syntax score) with MARCK to RPL (90% stenosis) and mRCA (80% stenosis). For staged PCI to LAD and circ 6/28.     Patient with 2/10 chest burning, non radiating.  Hemodynamically stable, mentating well with saturations in the mid to high 90's.  Repeat EKG without concerning findings.  Heparin gtt to be started with serial EKGs.  Morphine 2mg x 1.  Patient also complaining of R wrist pain.  Pulses intact with good capillary refill. 52 year old male with PMH HTN, HLD, DM presented with CP. Admitted with NSTEMI. Now s/p LHC (multivessel CAD, low syntax score) with MARCK to RPL (90% stenosis) and mRCA (80% stenosis). For staged PCI to LAD and circ 6/28.     Patient with 2/10 chest burning, non radiating.  Hemodynamically stable, mentating well with saturations in the mid to high 90's.  Repeat EKG without concerning findings.  Heparin gtt to be started with serial EKGs.  Morphine 2mg x 1.  Patient also complaining of R wrist pain.  Pulses intact with good capillary refill.    ROS normal with exception of above.    Neuro: Alert and oriented x 3  Pulm: Clear breaths sounds  CV: NSR S1 S2  GI: Nondistended/nontender.  Normal bowel sounds  Psych: Calm    Plan:    - Morphine 2mg IVP x 1  - Serial EKGs  - Heparin continuous infusion  - Frequent reevaluations to determine efficacy of interventions

## 2023-06-27 NOTE — ED PROVIDER NOTE - OBJECTIVE STATEMENT
52-year-old male with history of diabetes, hyperlipidemia who is presenting for posterior neck pain that started at 12 PM yesterday and then later at around 3 PM he began to have left-sided chest pain while he was sitting.  Patient reports the pain is intermittent, nonradiating last for a few seconds each episode resolved spontaneously, describes the pain as a choking sensation in his chest.  He reports he has no pain at this time.  Denies nausea vomiting.  Denies smoking history.

## 2023-06-27 NOTE — H&P ADULT - PROBLEM SELECTOR PLAN 1
Patient presenting with neck pain radiating to left chest  - Admit to Tele  - Given ASA 324mg in ED  - Continue heparin gtt  - Troponin trend 310.4 < 14.3  - Trend troponin to peak   - Check A1c, TSH, lipid panel   - Continue ASA, statin  - Keep NPO for now while pending cardiology evaluation for determination of cath  - F/u TTE to evaluate LV function and wall motion abnormalities  - Cardiology ([facility]) consulted, f/u recs Patient presenting with neck pain radiating to left chest  - Admit to Tele  - Given ASA 324mg in ED  - Continue heparin gtt  - Troponin trend 310.4 < 14.3  - Trend troponin to peak   - Check A1c, TSH, lipid panel   - Continue ASA, statin  - NPO, per cardiology pt to go for cath later today  - F/u TTE to evaluate LV function and wall motion abnormalities  - Cardiology Dr. Harmon consulted, f/u recs Patient presenting with neck pain radiating to left chest  - Admit to Tele  - Given ASA 324mg in ED  - Continue heparin gtt  - Troponin trend 310.4 < 14.3  - Trend troponin to peak   - Check A1c, TSH, lipid panel   - Continue ASA, statin  -currently NPO  - d/w interventional team- plan for cath this am  - F/u TTE to evaluate LV function and wall motion abnormalities  - Cardiology Dr. Harmon consulted, f/u recs

## 2023-06-27 NOTE — CONSULT NOTE ADULT - SUBJECTIVE AND OBJECTIVE BOX
Patient is a 52y old  Male who presents with a chief complaint of NSTEMI (27 Jun 2023 08:36)    HPI:  51yo male with PMH of T2DM on insulin, HTN, HLD who presents to the ED with posterior neck pain radiating to the left chest.    In the ED:  Vitals: Tmax 98.1F | HR 86 | /81> 131/79 | RR 18 | SpO2 98% on RA  Labs: trop 14.3 > 310.4, glucose 368, Na 133  CXR: on wet read, grossly clear lungs  ECG: NSR, rate 83, LAD  Received ASA 324mg, heparin gtt, 6u Humulin (27 Jun 2023 08:36)    Cardiology eval: Pt is 51 yo male with PMH of T2DM, HLD, who presented for chest pain. Pt reports that            PAST MEDICAL & SURGICAL HISTORY:  Diabetes mellitus  Hypercholesteremia  No significant past surgical history       MEDICATIONS  (STANDING):  heparin   Injectable 4100 Unit(s) IV Push once  heparin  Infusion.  Unit(s)/Hr (8 mL/Hr) IV Continuous <Continuous>  insulin regular  human recombinant 6 Unit(s) IV Push Once    MEDICATIONS  (PRN):  acetaminophen     Tablet .. 650 milliGRAM(s) Oral every 6 hours PRN Temp greater or equal to 38C (100.4F), Mild Pain (1 - 3)  heparin   Injectable 4100 Unit(s) IV Push every 6 hours PRN For aPTT less than 40      FAMILY HISTORY:  FH: diabetes mellitus      Denies Family history of CAD or early MI      Constitutional: denies fever, chills  HEENT: denies blurry vision, difficulty hearing  Respiratory: denies SOB, CRAMER, cough  Cardiovascular: denies CP, palpitations, orthopnea, PND, LE edema  Gastrointestinal: denies nausea, vomiting, abdominal pain  Genitourinary: denies urinary changes  Skin: Denies rashes, itching  Neurologic: denies headache, weakness, dizziness  Hematology/Oncology: denies bleeding, easy bruising  ROS negative except as noted above      SOCIAL HISTORY:  No tobacco, Alcohol or Ddrug use    Vital Signs Last 24 Hrs  T(C): 36.4 (27 Jun 2023 07:47), Max: 36.7 (27 Jun 2023 01:19)  T(F): 97.6 (27 Jun 2023 07:47), Max: 98.1 (27 Jun 2023 01:19)  HR: 78 (27 Jun 2023 07:47) (78 - 86)  BP: 131/79 (27 Jun 2023 07:47) (131/79 - 150/81)  RR: 20 (27 Jun 2023 07:47) (18 - 20)  SpO2: 98% (27 Jun 2023 07:47) (98% - 98%)    Parameters below as of 27 Jun 2023 07:47  Patient On (Oxygen Delivery Method): room air    Physical Exam:  General: Well developed, well nourished, NAD  HEENT: NCAT, PERRLA, EOMI bl, moist mucous membranes   Neck: Supple, nontender, no mass  Neurology: A&Ox3, nonfocal, sensation intact   Respiratory: CTA B/L, No W/R/R  CV: RRR, +S1/S2, no murmurs, rubs or gallops  Abdominal: Soft, NT, ND +BSx4, no palpable masses  Extremities: No C/C/E, + peripheral pulses  MSK: Normal ROM, no joint erythema or warmth, no joint swelling   Heme: No obvious ecchymosis or petechiae   Skin: warm, dry, normal color        LABS:                        13.9   8.00  )-----------( 180      ( 27 Jun 2023 02:34 )             40.1     06-27    133<L>  |  101  |  22  ----------------------------<  368<H>  4.1   |  29  |  1.10    Ca    9.0      27 Jun 2023 02:34    TPro  6.7  /  Alb  3.3  /  TBili  0.3  /  DBili  x   /  AST  16  /  ALT  25  /  AlkPhos  88  06-27        Urinalysis Basic - ( 27 Jun 2023 02:34 )  Color: x / Appearance: x / SG: x / pH: x  Gluc: 368 mg/dL / Ketone: x  / Bili: x / Urobili: x   Blood: x / Protein: x / Nitrite: x   Leuk Esterase: x / RBC: x / WBC x   Sq Epi: x / Non Sq Epi: x / Bacteria: x       Patient is a 52y old  Male who presents with a chief complaint of NSTEMI (27 Jun 2023 08:36)    HPI:  51yo male with PMH of T2DM on insulin, HTN, HLD who presents to the ED with posterior neck pain radiating to the left chest.    In the ED:  Vitals: Tmax 98.1F | HR 86 | /81> 131/79 | RR 18 | SpO2 98% on RA  Labs: trop 14.3 > 310.4, glucose 368, Na 133  CXR: on wet read, grossly clear lungs  ECG: NSR, rate 83, LAD  Received ASA 324mg, heparin gtt, 6u Humulin (27 Jun 2023 08:36)    * Cardiology eval: Pt is 53 yo male with PMH of T2DM, HLD, who presented for chest pain. Pt reports that yesterday at 3 pm started to have episodes of chest pain, intensity 8/10, non radiated, on/off that lasted 1 to few minutes and resolved, non associated to other symptoms.  At 9 pm he presented an episode of severe chest pain at rest, intensity 10/10, radiated to the neck and LUE, associated to SOB and diaphoresis and lasted 1 hour. He came to the hospital and received ASA 324mg x1 and 6U of insulin for hyperglycemia over 300. The pain has improved being 3-5/10 in the last hours, non radiated and non associated to other symptoms.     Pt admits 3 months of CRAMER associated to diaphoresis.  He reports that echo was done about 4 months ago with no abnormalities reported and had and stress test about 2 months ago. He does not have report of the stress test, but was told that was abnormal and may need hospital procedure. For unclear reason he was sent to a different cardio eval and has appointment in 2 months.        PAST MEDICAL & SURGICAL HISTORY:  Diabetes mellitus  Hypercholesteremia  No significant past surgical history       MEDICATIONS  (STANDING):  heparin   Injectable 4100 Unit(s) IV Push once  heparin  Infusion.  Unit(s)/Hr (8 mL/Hr) IV Continuous <Continuous>  insulin regular  human recombinant 6 Unit(s) IV Push Once    MEDICATIONS  (PRN):  acetaminophen     Tablet .. 650 milliGRAM(s) Oral every 6 hours PRN Temp greater or equal to 38C (100.4F), Mild Pain (1 - 3)  heparin   Injectable 4100 Unit(s) IV Push every 6 hours PRN For aPTT less than 40      FAMILY HISTORY:  FH: diabetes mellitus      Denies Family history of CAD or early MI      Constitutional: denies fever, chills  HEENT: denies blurry vision, difficulty hearing  Respiratory: denies SOB.  + cough  Cardiovascular: denies palpitations, orthopnea, PND, LE edema.  + CRAMER as mentioned in HPI  Gastrointestinal: denies nausea, vomiting, abdominal pain  Genitourinary: denies urinary changes  Skin: Denies rashes, itching  Neurologic: denies headache, weakness, dizziness.  + feet pain   Hematology/Oncology: denies bleeding, easy bruising  ROS negative except as noted above      SOCIAL HISTORY:  No tobacco, Alcohol or Ddrug use    Vital Signs Last 24 Hrs  T(C): 36.4 (27 Jun 2023 07:47), Max: 36.7 (27 Jun 2023 01:19)  T(F): 97.6 (27 Jun 2023 07:47), Max: 98.1 (27 Jun 2023 01:19)  HR: 78 (27 Jun 2023 07:47) (78 - 86)  BP: 131/79 (27 Jun 2023 07:47) (131/79 - 150/81)  RR: 20 (27 Jun 2023 07:47) (18 - 20)  SpO2: 98% (27 Jun 2023 07:47) (98% - 98%)    Parameters below as of 27 Jun 2023 07:47  Patient On (Oxygen Delivery Method): room air    Physical Exam:  General: Well developed, well nourished, NAD  HEENT: NCAT, PERRLA, EOMI bl, moist mucous membranes   Neck: Supple, nontender, no mass  Neurology: A&Ox3, nonfocal  Respiratory: CTA B/L  CV: RRR, +S1/S2, no murmurs, rubs or gallops  Abdominal: Soft, NT, ND +BSx4, no palpable masses  Extremities: No LE edema, + peripheral pulses  MSK: Normal ROM, no joint erythema or warmth, no joint swelling   Heme: No obvious ecchymosis or petechiae   Skin: warm, dry      LABS:                        13.9   8.00  )-----------( 180      ( 27 Jun 2023 02:34 )             40.1     06-27    133<L>  |  101  |  22  ----------------------------<  368<H>  4.1   |  29  |  1.10    Ca    9.0      27 Jun 2023 02:34    TPro  6.7  /  Alb  3.3  /  TBili  0.3  /  DBili  x   /  AST  16  /  ALT  25  /  AlkPhos  88  06-27      Urinalysis Basic - ( 27 Jun 2023 02:34 )  Color: x / Appearance: x / SG: x / pH: x  Gluc: 368 mg/dL / Ketone: x  / Bili: x / Urobili: x   Blood: x / Protein: x / Nitrite: x   Leuk Esterase: x / RBC: x / WBC x   Sq Epi: x / Non Sq Epi: x / Bacteria: x       Patient is a 52y old  Male who presents with a chief complaint of NSTEMI (27 Jun 2023 08:36)    HPI:  53yo male with PMH of T2DM on insulin, HTN, HLD who presents to the ED with posterior neck pain radiating to the left chest.    In the ED:  Vitals: Tmax 98.1F | HR 86 | /81> 131/79 | RR 18 | SpO2 98% on RA  Labs: trop 14.3 > 310.4, glucose 368, Na 133  CXR: on wet read, grossly clear lungs  ECG: NSR, rate 83, LAD  Received ASA 324mg, heparin gtt, 6u Humulin (27 Jun 2023 08:36)    * Cardiology eval: Pt is 53 yo male with PMH of T2DM, HLD, who presented for chest pain. Pt reports that yesterday at 3 pm started to have episodes of chest pain, intensity 8/10, non radiated, on/off that lasted 1 to few minutes and resolved, non associated to other symptoms.  At 9 pm he presented an episode of severe chest pain at rest, intensity 10/10, radiated to the neck and LUE, associated to SOB and diaphoresis and lasted 1 hour. He came to the hospital and received ASA 324mg x1 and 6U of insulin for hyperglycemia over 300. The pain has improved being 3-5/10 in the last hours, non radiated and non associated to other symptoms.     Pt admits 3 months of CRAMER associated to diaphoresis.  He reports that echo was done about 4 months ago with no abnormalities reported and had and stress test about 2 months ago with Dr. Vidal Santiago. He does not have report of the stress test, but was told that was abnormal and may need hospital procedure. For unclear reason he was sent to a different cardio eval and has appointment in 2 months.    Evaluation done in Slovenian by resident, and translation done by resident for attending-Cardiologist.     PAST MEDICAL & SURGICAL HISTORY:  Diabetes mellitus  Hypercholesteremia  No significant past surgical history       MEDICATIONS  (STANDING):  heparin   Injectable 4100 Unit(s) IV Push once  heparin  Infusion.  Unit(s)/Hr (8 mL/Hr) IV Continuous <Continuous>  insulin regular  human recombinant 6 Unit(s) IV Push Once    MEDICATIONS  (PRN):  acetaminophen     Tablet .. 650 milliGRAM(s) Oral every 6 hours PRN Temp greater or equal to 38C (100.4F), Mild Pain (1 - 3)  heparin   Injectable 4100 Unit(s) IV Push every 6 hours PRN For aPTT less than 40      FAMILY HISTORY:  FH: diabetes mellitus      Denies Family history of CAD or early MI      Constitutional: denies fever, chills  HEENT: denies blurry vision, difficulty hearing  Respiratory: denies SOB.  + cough  Cardiovascular: denies palpitations, orthopnea, PND, LE edema.  + CRAMER as mentioned in HPI  Gastrointestinal: denies nausea, vomiting, abdominal pain  Genitourinary: denies urinary changes  Skin: Denies rashes, itching  Neurologic: denies headache, weakness, dizziness.  + feet pain   Hematology/Oncology: denies bleeding, easy bruising  ROS negative except as noted above      SOCIAL HISTORY:  No tobacco, Alcohol or Ddrug use    Vital Signs Last 24 Hrs  T(C): 36.4 (27 Jun 2023 07:47), Max: 36.7 (27 Jun 2023 01:19)  T(F): 97.6 (27 Jun 2023 07:47), Max: 98.1 (27 Jun 2023 01:19)  HR: 78 (27 Jun 2023 07:47) (78 - 86)  BP: 131/79 (27 Jun 2023 07:47) (131/79 - 150/81)  RR: 20 (27 Jun 2023 07:47) (18 - 20)  SpO2: 98% (27 Jun 2023 07:47) (98% - 98%)    Parameters below as of 27 Jun 2023 07:47  Patient On (Oxygen Delivery Method): room air    Physical Exam:  General: Well developed, well nourished, NAD  HEENT: NCAT, PERRLA, EOMI bl, moist mucous membranes   Neck: Supple, nontender, no mass  Neurology: A&Ox3, nonfocal  Respiratory: CTA B/L  CV: RRR, +S1/S2, no murmurs, rubs or gallops  Abdominal: Soft, NT, ND +BSx4, no palpable masses  Extremities: No LE edema, + peripheral pulses  MSK: Normal ROM, no joint erythema or warmth, no joint swelling   Heme: No obvious ecchymosis or petechiae   Skin: warm, dry      LABS:                        13.9   8.00  )-----------( 180      ( 27 Jun 2023 02:34 )             40.1     06-27    133<L>  |  101  |  22  ----------------------------<  368<H>  4.1   |  29  |  1.10    Ca    9.0      27 Jun 2023 02:34    TPro  6.7  /  Alb  3.3  /  TBili  0.3  /  DBili  x   /  AST  16  /  ALT  25  /  AlkPhos  88  06-27      Urinalysis Basic - ( 27 Jun 2023 02:34 )  Color: x / Appearance: x / SG: x / pH: x  Gluc: 368 mg/dL / Ketone: x  / Bili: x / Urobili: x   Blood: x / Protein: x / Nitrite: x   Leuk Esterase: x / RBC: x / WBC x   Sq Epi: x / Non Sq Epi: x / Bacteria: x

## 2023-06-27 NOTE — CHART NOTE - NSCHARTNOTEFT_GEN_A_CORE
Radial band removed per guidelines. Uneventful. R wrist C/D/I/soft/no hematoma. Motor, neuro, circ intact proximal and distal. R radial pulse 2+. Post band removal VS/monitoring ordered.

## 2023-06-27 NOTE — H&P ADULT - ASSESSMENT
53 yo m pmh insulin dependent DM, HLD, presenting with posterior neck pain which then began radiating to left chest, admitted with NSTEMI   51yo male with PMH of T2DM on insulin, HTN, HLD who presents to the ED with posterior neck pain radiating to the left chest, admitted for NSTEMI.

## 2023-06-27 NOTE — PATIENT PROFILE ADULT - FALL HARM RISK - HARM RISK INTERVENTIONS

## 2023-06-27 NOTE — ED ADULT TRIAGE NOTE - CHIEF COMPLAINT QUOTE
52 yr old male c/o chest pain since today. Malay speaking only. Reports it started as neck pain at 12 noon and moved to the Chest approx pm.

## 2023-06-27 NOTE — CONSULT NOTE ADULT - SUBJECTIVE AND OBJECTIVE BOX
Department of Cardiology                                                               Division of Interventional Cardiology                                                               [facility]/[place] [address]                                                                                 [phone]                                                                                                                               Interventional Cardiology Consult / Pre-Procedure Note      Recent/pertinent 24 hour events: Information obtained via Dr doyle who interviewed and consented pt in Bulgarian. HPI as noted below, no significant changes. Pt still with CP, now 2-3 (was 8/10). Denies palpitations, N/V, fever/chills, abd pain, numbness/tingling/weakness, other c/o at this time.   ROS negative x 10 systems except as documented as above.      HPI:  53yo male with PMH of T2DM on insulin, HTN, HLD who presents to the ED with posterior neck pain radiating to the left chest.    In the ED:  Vitals: Tmax 98.1F | HR 86 | /81> 131/79 | RR 18 | SpO2 98% on RA  Labs: trop 14.3 > 310.4, glucose 368, Na 133  CXR: on wet read, grossly clear lungs  ECG: NSR, rate 83, LAD  Received ASA 324mg, heparin gtt, 6u Humulin (27 Jun 2023 08:36)      PAST MEDICAL & SURGICAL HISTORY:  Diabetes mellitus  Hypertension  Hypercholesteremia  No significant past surgical history    FAMILY HISTORY:  FH: diabetes mellitus    SOCIAL HISTORY:  Marital status:   Lives with: wife and 2 daughters  Occupation:    ADLs: independent  Assistive Devices: none    Tobacco use: denies/never  Alcohol use: denies  Illicit drug use: denies    Code status: full code  HCP/surrogate: Pooja (wife), [id number]           Prior Cardiac Testing/Interventions: none         Associated Risk Factors:        Frailty Screening: (N/A, mild, mod, severe)       Cerebrovascular Disease: N/A       Chronic Lung Disease: N/A       Peripheral Arterial Disease: N/A       Chronic Kidney Disease (if yes, what is GFR): N/A       Uncontrolled Diabetes (if yes, what is HgbA1C or FBS): N/A       Poorly Controlled Hypertension (if yes, what is SBP): N/A       Morbid Obesity (if yes, what is BMI): N/A       History of Recent Ventricular Arrhythmia: N/A       Inability to Ambulate Safely: N/A       Need for Therapeutic Anticoagulation: N/A       Antiplatelet or Contrast Allergy: N/A      MEDICATIONS  (STANDING):  aspirin  chewable 81 milliGRAM(s) Oral daily  atorvastatin 40 milliGRAM(s) Oral at bedtime  gabapentin 300 milliGRAM(s) Oral three times a day  glucagon  Injectable 1 milliGRAM(s) IntraMuscular once  insulin glargine Injectable (LANTUS) 15 Unit(s) SubCutaneous at bedtime  insulin lispro (ADMELOG) corrective regimen sliding scale   SubCutaneous every 6 hours  losartan 25 milliGRAM(s) Oral daily  sodium chloride 0.9% Bolus 250 milliLiter(s) IV Bolus once  sodium chloride 0.9%. 500 milliLiter(s) (75 mL/Hr) IV Continuous     MEDICATIONS  (PRN):  acetaminophen     Tablet .. 650 milliGRAM(s) Oral every 6 hours PRN Temp greater or equal to 38C (100.4F), Mild Pain (1 - 3)  dextrose Oral Gel 15 Gram(s) Oral once PRN Blood Glucose LESS THAN 70 milliGRAM(s)/deciliter    No Known Allergies      ECG 6/27/23 @ 1027 hrs (my read): NSR @ 79, LAD, TWI III, aVF      T(C): 36.8 (06-27-23 @ 10:18), Max: 36.8 (06-27-23 @ 10:18)  HR: 78 (06-27-23 @ 10:18) (78 - 86), NSR  BP: 143/73 (06-27-23 @ 10:18) (131/79 - 150/81)  RR: 16 (06-27-23 @ 10:18) (16 - 20)  SpO2: 98% (06-27-23 @ 07:47) (98% - 98%)      LABS:	 	                        13.9   8.00  )-----------( 180      ( 27 Jun 2023 02:34 )             40.1     06-27    133<L>  |  101  |  22  ----------------------------<  368<H>  4.1   |  29  |  1.10    Ca    9.0      27 Jun 2023 02:34    TPro  6.7  /  Alb  3.3  /  TBili  0.3  /  DBili  x   /  AST  16  /  ALT  25  /  AlkPhos  88  06-27      Constitutional: NAD  Neuro: A+O x 3, non-focal, speech clear and intact  HEENT: NC/AT, PERRL, EOMI, anicteric sclerae, oral mucosa pink and moist  Neck: supple, no JVD  CV: regular rate, regular rhythm, +S1S2, no murmurs or rub  Pulm/chest: lung sounds CTA and equal bilaterally, no accessory muscle use noted  Abd: soft, NT, ND, +BS  Ext: KAUR x 4, no C/C/E  Pulses: R radial 2+, R femoral 2+, bilat DP 2+  Skin: warm, well perfused  Psych: calm, appropriate affect

## 2023-06-27 NOTE — CONSULT NOTE ADULT - ASSESSMENT
52 year old male with PMH HTN, DM, HLD. Pt p/w CP, admitted with NSTEMI.    Plan:  to cath lab for left heart catheterization   pt given aspirin 324 x 1 and started on heparin infusion after bolus in ED  remainder pf plan pending cath findings

## 2023-06-27 NOTE — ASU PATIENT PROFILE, ADULT - FALL HARM RISK - UNIVERSAL INTERVENTIONS
Bed in lowest position, wheels locked, appropriate side rails in place/Call bell, personal items and telephone in reach/Instruct patient to call for assistance before getting out of bed or chair/Non-slip footwear when patient is out of bed/[place] to call system/Physically safe environment - no spills, clutter or unnecessary equipment/Purposeful Proactive Rounding/Room/bathroom lighting operational, light cord in reach

## 2023-06-27 NOTE — H&P ADULT - NSHPREVIEWOFSYSTEMS_GEN_ALL_CORE
REVIEW OF SYSTEMS:    CONSTITUTIONAL: No weakness, fevers or chills. +Fatigue  HEENT:  No headache, no visual changes, no sore throat, neck supple  RESPIRATORY: +cough, +CRAMER, no wheezing, hemoptysis  CARDIOVASCULAR: +left sided chest pain, no palpitations  GASTROINTESTINAL: No abdominal pain, no nausea, vomiting, or hematemesis; No diarrhea or constipation. No melena or hematochezia.  GENITOURINARY: No dysuria, frequency or hematuria  NEUROLOGICAL: No focal weakness or dizziness   MSK: No myalgias  SKIN: No itching, burning, rashes, or lesions

## 2023-06-27 NOTE — H&P ADULT - PROBLEM SELECTOR PLAN 2
Chronic  - BP elevated on presentation to the ED, now improved  - On Losartan 25mg daily, continue inpatient with hold parameters  - Monitor routine hemodynamics

## 2023-06-27 NOTE — ED PROVIDER NOTE - CLINICAL SUMMARY MEDICAL DECISION MAKING FREE TEXT BOX
This 52-year-old gentleman with history of diabetes, hyperlipidemia presenting with left-sided chest pain that started approximately 3 PM nonexertional intermittent and resolving spontaneously, no chest pain during my evaluation.  Will evaluate for ACS, PE, pneumothorax, musculoskeletal etiology.  Will check labs, EKG, chest x-ray.  Will reassess.

## 2023-06-27 NOTE — H&P ADULT - TIME BILLING
Pt seen + examined. Case discussed with resident. Agree with assessment and plan above (edited by me in detail):  Time spent: 80min. More than 50% of the visit was spent counseling the patient on medical condition and coordination of care, excluding teaching time.

## 2023-06-27 NOTE — CONSULT NOTE ADULT - CRITICAL CARE ATTENDING COMMENT
dm and hypercholesterolemia  sxs consistent with unstable angina, small trop bump  would pursue cardiac cath with plan for catheter or surgical intervention as appropriate for presumed severe cad    Upon my evaluation, this patient is at high risk for imminent or life threatening deterioration due to ischemia,  and other active medical issues which require my direct attention, intervention, and personal management.  I have personally spent >35 minutes  of critical care time exclusive of time spent on separate billing procedures. This includes review of laboratory data, radiology results, discussion with primary team\patient, and monitoring for potential decompensation Interventions were performed as documented above.

## 2023-06-27 NOTE — H&P ADULT - HISTORY OF PRESENT ILLNESS
51 yo m pmh insulin dependent DM, HLD, presenting with posterior neck pain which then began radiating to left chest.     In the ED, cbc, cmp, ddimer, toponin, ekg were obtained. significant for troponin 14.3 ->310.4, Na 133, glucose 368.   EKG:  VSS    Pt was given ASA 324mg, 6u humulin and started on a heparin gtt.  51yo male with PMH of T2DM on insulin, HTN, HLD who presents to the ED with posterior neck pain radiating to the left chest.    In the ED:  Vitals: Tmax 98.1F | HR 86 | /81> 131/79 | RR 18 | SpO2 98% on RA  Labs: trop 14.3 > 310.4, glucose 368, Na 133  CXR: on wet read, grossly clear lungs  ECG: NSR, rate 83, LAD  Received ASA 324mg, heparin gtt, 6u Humulin 53yo male with PMH of T2DM on insulin, HTN, HLD who presents to the ED with posterior neck pain radiating to the left chest.  used, ID# [id number]. Pt states around noon yesterday he developed posterior neck pain radiating up to the back of his head. Around 3pm he began to have left sided chest pain while sedentary, intermittent, non-radiating, resolving within a few seconds. Last night around 9pm he developed worsening left sided chest pain with radiation to his neck and left arm, along with diaphoresis and SOB, described it as a choking sensation in his chest/throat. Pt notes he has been having dyspnea on exertion for the past 3mos, went to see his cardiologist and had a stress test done 2 mos ago that he was unable to complete due to dyspnea, was told the test was abnormal but unsure of what the follow up recommendations were. He reports no history of smoking. During time of evaluation patient still reporting mild left sided chest pain, though improved.    In the ED:  Vitals: Tmax 98.1F | HR 86 | /81> 131/79 | RR 18 | SpO2 98% on RA  Labs: trop 14.3 > 310.4, glucose 368, Na 133  CXR: on wet read, grossly clear lungs  ECG: NSR, rate 83, LAD  Received ASA 324mg, heparin gtt, 6u Humulin

## 2023-06-27 NOTE — H&P ADULT - NSHPPHYSICALEXAM_GEN_ALL_CORE
Vital Signs Last 24 Hrs  T(C): 36.4 (27 Jun 2023 07:47), Max: 36.7 (27 Jun 2023 01:19)  T(F): 97.6 (27 Jun 2023 07:47), Max: 98.1 (27 Jun 2023 01:19)  HR: 78 (27 Jun 2023 07:47) (78 - 86)  BP: 131/79 (27 Jun 2023 07:47) (131/79 - 150/81)  BP(mean): --  RR: 20 (27 Jun 2023 07:47) (18 - 20)  SpO2: 98% (27 Jun 2023 07:47) (98% - 98%)    Parameters below as of 27 Jun 2023 07:47  Patient On (Oxygen Delivery Method): room air Vital Signs Last 24 Hrs  T(C): 36.4 (27 Jun 2023 07:47), Max: 36.7 (27 Jun 2023 01:19)  T(F): 97.6 (27 Jun 2023 07:47), Max: 98.1 (27 Jun 2023 01:19)  HR: 78 (27 Jun 2023 07:47) (78 - 86)  BP: 131/79 (27 Jun 2023 07:47) (131/79 - 150/81)  BP(mean): --  RR: 20 (27 Jun 2023 07:47) (18 - 20)  SpO2: 98% (27 Jun 2023 07:47) (98% - 98%)    Parameters below as of 27 Jun 2023 07:47  Patient On (Oxygen Delivery Method): room air    Physical Exam:   GENERAL: well-groomed, well-developed, NAD  HEENT: head NC/AT; EOMI, PERRL, conjunctiva & sclera clear; hearing grossly intact, moist mucous membranes  NECK: supple, no JVD  RESPIRATORY: CTA B/L, no wheezing, rales, rhonchi or rubs  CARDIOVASCULAR: S1&S2, RRR, +RUSB systolic murmur  ABDOMEN: soft, non-tender, non-distended, + Bowel sounds x4 quadrants, no guarding, rebound or rigidity  MUSCULOSKELETAL:  no clubbing, cyanosis or edema of all 4 extremities  SKIN: warm and dry, color normal  NEUROLOGIC: AA&O X3, no focal deficits, no sensory loss  Psych: Normal mood and affect, normal behavior

## 2023-06-27 NOTE — H&P ADULT - NSHPOUTPATIENTPROVIDERS_GEN_ALL_CORE
PCP Ayden PMD: Dr. Chong Hensley  Endocrinology: Dr. Kenneth Toth PMD: Dr. Chong Hensley  Endocrinology: Dr. Kenneth Toth  Cardio: Dr. Vidal Santiago

## 2023-06-28 ENCOUNTER — TRANSCRIPTION ENCOUNTER (OUTPATIENT)
Age: 53
End: 2023-06-28

## 2023-06-28 LAB
A1C WITH ESTIMATED AVERAGE GLUCOSE RESULT: 11.2 % — HIGH (ref 4–5.6)
ALBUMIN SERPL ELPH-MCNC: 3.2 G/DL — LOW (ref 3.3–5)
ALP SERPL-CCNC: 70 U/L — SIGNIFICANT CHANGE UP (ref 40–120)
ALT FLD-CCNC: 25 U/L — SIGNIFICANT CHANGE UP (ref 12–78)
ANION GAP SERPL CALC-SCNC: 4 MMOL/L — LOW (ref 5–17)
APTT BLD: 29.6 SEC — SIGNIFICANT CHANGE UP (ref 27.5–35.5)
APTT BLD: 45.8 SEC — HIGH (ref 27.5–35.5)
AST SERPL-CCNC: 29 U/L — SIGNIFICANT CHANGE UP (ref 15–37)
BASOPHILS # BLD AUTO: 0.03 K/UL — SIGNIFICANT CHANGE UP (ref 0–0.2)
BASOPHILS NFR BLD AUTO: 0.3 % — SIGNIFICANT CHANGE UP (ref 0–2)
BILIRUB SERPL-MCNC: 0.5 MG/DL — SIGNIFICANT CHANGE UP (ref 0.2–1.2)
BUN SERPL-MCNC: 16 MG/DL — SIGNIFICANT CHANGE UP (ref 7–23)
CALCIUM SERPL-MCNC: 8.6 MG/DL — SIGNIFICANT CHANGE UP (ref 8.5–10.1)
CHLORIDE SERPL-SCNC: 106 MMOL/L — SIGNIFICANT CHANGE UP (ref 96–108)
CHOLEST SERPL-MCNC: 215 MG/DL — HIGH
CK MB BLD-MCNC: 6.6 % — HIGH (ref 0–3.5)
CK MB CFR SERPL CALC: 13.4 NG/ML — HIGH (ref 0–3.6)
CK SERPL-CCNC: 203 U/L — SIGNIFICANT CHANGE UP (ref 26–308)
CO2 SERPL-SCNC: 26 MMOL/L — SIGNIFICANT CHANGE UP (ref 22–31)
CREAT SERPL-MCNC: 0.8 MG/DL — SIGNIFICANT CHANGE UP (ref 0.5–1.3)
EGFR: 108 ML/MIN/1.73M2 — SIGNIFICANT CHANGE UP
EOSINOPHIL # BLD AUTO: 0.27 K/UL — SIGNIFICANT CHANGE UP (ref 0–0.5)
EOSINOPHIL NFR BLD AUTO: 2.9 % — SIGNIFICANT CHANGE UP (ref 0–6)
ESTIMATED AVERAGE GLUCOSE: 275 MG/DL — HIGH (ref 68–114)
GLUCOSE SERPL-MCNC: 233 MG/DL — HIGH (ref 70–99)
HCT VFR BLD CALC: 42.2 % — SIGNIFICANT CHANGE UP (ref 39–50)
HDLC SERPL-MCNC: 38 MG/DL — LOW
HGB BLD-MCNC: 14.5 G/DL — SIGNIFICANT CHANGE UP (ref 13–17)
IMM GRANULOCYTES NFR BLD AUTO: 0.9 % — SIGNIFICANT CHANGE UP (ref 0–0.9)
LIPID PNL WITH DIRECT LDL SERPL: SIGNIFICANT CHANGE UP MG/DL
LYMPHOCYTES # BLD AUTO: 2.71 K/UL — SIGNIFICANT CHANGE UP (ref 1–3.3)
LYMPHOCYTES # BLD AUTO: 29 % — SIGNIFICANT CHANGE UP (ref 13–44)
MCHC RBC-ENTMCNC: 29.2 PG — SIGNIFICANT CHANGE UP (ref 27–34)
MCHC RBC-ENTMCNC: 34.4 GM/DL — SIGNIFICANT CHANGE UP (ref 32–36)
MCV RBC AUTO: 85.1 FL — SIGNIFICANT CHANGE UP (ref 80–100)
MONOCYTES # BLD AUTO: 0.68 K/UL — SIGNIFICANT CHANGE UP (ref 0–0.9)
MONOCYTES NFR BLD AUTO: 7.3 % — SIGNIFICANT CHANGE UP (ref 2–14)
NEUTROPHILS # BLD AUTO: 5.56 K/UL — SIGNIFICANT CHANGE UP (ref 1.8–7.4)
NEUTROPHILS NFR BLD AUTO: 59.6 % — SIGNIFICANT CHANGE UP (ref 43–77)
NON HDL CHOLESTEROL: 177 MG/DL — HIGH
NRBC # BLD: 0 /100 WBCS — SIGNIFICANT CHANGE UP (ref 0–0)
PLATELET # BLD AUTO: 180 K/UL — SIGNIFICANT CHANGE UP (ref 150–400)
POTASSIUM SERPL-MCNC: 4.1 MMOL/L — SIGNIFICANT CHANGE UP (ref 3.5–5.3)
POTASSIUM SERPL-SCNC: 4.1 MMOL/L — SIGNIFICANT CHANGE UP (ref 3.5–5.3)
PROT SERPL-MCNC: 6.7 G/DL — SIGNIFICANT CHANGE UP (ref 6–8.3)
RBC # BLD: 4.96 M/UL — SIGNIFICANT CHANGE UP (ref 4.2–5.8)
RBC # FLD: 12.7 % — SIGNIFICANT CHANGE UP (ref 10.3–14.5)
SODIUM SERPL-SCNC: 136 MMOL/L — SIGNIFICANT CHANGE UP (ref 135–145)
TRIGL SERPL-MCNC: 494 MG/DL — HIGH
TROPONIN I, HIGH SENSITIVITY RESULT: 2275.4 NG/L — HIGH
TSH SERPL-MCNC: 2.05 UIU/ML — SIGNIFICANT CHANGE UP (ref 0.36–3.74)
WBC # BLD: 9.33 K/UL — SIGNIFICANT CHANGE UP (ref 3.8–10.5)
WBC # FLD AUTO: 9.33 K/UL — SIGNIFICANT CHANGE UP (ref 3.8–10.5)

## 2023-06-28 PROCEDURE — 99254 IP/OBS CNSLTJ NEW/EST MOD 60: CPT

## 2023-06-28 PROCEDURE — 92979 ENDOLUMINL IVUS OCT C EA: CPT | Mod: 26,LC

## 2023-06-28 PROCEDURE — 92928 PRQ TCAT PLMT NTRAC ST 1 LES: CPT | Mod: LD

## 2023-06-28 PROCEDURE — 92978 ENDOLUMINL IVUS OCT C 1ST: CPT | Mod: 26,LD

## 2023-06-28 PROCEDURE — 99233 SBSQ HOSP IP/OBS HIGH 50: CPT

## 2023-06-28 PROCEDURE — 93306 TTE W/DOPPLER COMPLETE: CPT | Mod: 26

## 2023-06-28 PROCEDURE — 93010 ELECTROCARDIOGRAM REPORT: CPT

## 2023-06-28 PROCEDURE — 99152 MOD SED SAME PHYS/QHP 5/>YRS: CPT

## 2023-06-28 RX ORDER — SODIUM CHLORIDE 9 MG/ML
250 INJECTION INTRAMUSCULAR; INTRAVENOUS; SUBCUTANEOUS ONCE
Refills: 0 | Status: COMPLETED | OUTPATIENT
Start: 2023-06-28 | End: 2023-06-28

## 2023-06-28 RX ORDER — ASPIRIN/CALCIUM CARB/MAGNESIUM 324 MG
81 TABLET ORAL ONCE
Refills: 0 | Status: COMPLETED | OUTPATIENT
Start: 2023-06-28 | End: 2023-06-28

## 2023-06-28 RX ORDER — TICAGRELOR 90 MG/1
1 TABLET ORAL
Qty: 60 | Refills: 0
Start: 2023-06-28 | End: 2023-07-27

## 2023-06-28 RX ORDER — TICAGRELOR 90 MG/1
1 TABLET ORAL
Qty: 180 | Refills: 3
Start: 2023-06-28 | End: 2024-06-21

## 2023-06-28 RX ORDER — ASPIRIN/CALCIUM CARB/MAGNESIUM 324 MG
1 TABLET ORAL
Qty: 90 | Refills: 3
Start: 2023-06-28 | End: 2024-06-21

## 2023-06-28 RX ORDER — LIDOCAINE HYDROCHLORIDE AND EPINEPHRINE 10; 10 MG/ML; UG/ML
1 INJECTION, SOLUTION INFILTRATION; PERINEURAL ONCE
Refills: 0 | Status: COMPLETED | OUTPATIENT
Start: 2023-06-28 | End: 2023-06-28

## 2023-06-28 RX ORDER — ASPIRIN/CALCIUM CARB/MAGNESIUM 324 MG
1 TABLET ORAL
Qty: 30 | Refills: 0
Start: 2023-06-28 | End: 2023-07-27

## 2023-06-28 RX ORDER — SODIUM CHLORIDE 9 MG/ML
500 INJECTION INTRAMUSCULAR; INTRAVENOUS; SUBCUTANEOUS
Refills: 0 | Status: DISCONTINUED | OUTPATIENT
Start: 2023-06-28 | End: 2023-06-30

## 2023-06-28 RX ORDER — MORPHINE SULFATE 50 MG/1
2 CAPSULE, EXTENDED RELEASE ORAL ONCE
Refills: 0 | Status: DISCONTINUED | OUTPATIENT
Start: 2023-06-28 | End: 2023-06-28

## 2023-06-28 RX ORDER — ACETAMINOPHEN 500 MG
1000 TABLET ORAL ONCE
Refills: 0 | Status: COMPLETED | OUTPATIENT
Start: 2023-06-28 | End: 2023-06-28

## 2023-06-28 RX ORDER — ASPIRIN/CALCIUM CARB/MAGNESIUM 324 MG
81 TABLET ORAL DAILY
Refills: 0 | Status: DISCONTINUED | OUTPATIENT
Start: 2023-06-29 | End: 2023-06-30

## 2023-06-28 RX ORDER — SODIUM CHLORIDE 9 MG/ML
500 INJECTION INTRAMUSCULAR; INTRAVENOUS; SUBCUTANEOUS
Refills: 0 | Status: DISCONTINUED | OUTPATIENT
Start: 2023-06-28 | End: 2023-06-28

## 2023-06-28 RX ORDER — FENTANYL CITRATE 50 UG/ML
50 INJECTION INTRAVENOUS ONCE
Refills: 0 | Status: DISCONTINUED | OUTPATIENT
Start: 2023-06-28 | End: 2023-06-28

## 2023-06-28 RX ORDER — NITROGLYCERIN 6.5 MG
0.4 CAPSULE, EXTENDED RELEASE ORAL ONCE
Refills: 0 | Status: COMPLETED | OUTPATIENT
Start: 2023-06-28 | End: 2023-06-28

## 2023-06-28 RX ORDER — ONDANSETRON 8 MG/1
4 TABLET, FILM COATED ORAL ONCE
Refills: 0 | Status: DISCONTINUED | OUTPATIENT
Start: 2023-06-28 | End: 2023-06-28

## 2023-06-28 RX ADMIN — Medication 25 MILLIGRAM(S): at 17:44

## 2023-06-28 RX ADMIN — INSULIN GLARGINE 15 UNIT(S): 100 INJECTION, SOLUTION SUBCUTANEOUS at 21:37

## 2023-06-28 RX ADMIN — HEPARIN SODIUM 950 UNIT(S)/HR: 5000 INJECTION INTRAVENOUS; SUBCUTANEOUS at 07:05

## 2023-06-28 RX ADMIN — TICAGRELOR 90 MILLIGRAM(S): 90 TABLET ORAL at 17:44

## 2023-06-28 RX ADMIN — LOSARTAN POTASSIUM 25 MILLIGRAM(S): 100 TABLET, FILM COATED ORAL at 06:17

## 2023-06-28 RX ADMIN — Medication 6: at 11:30

## 2023-06-28 RX ADMIN — TICAGRELOR 90 MILLIGRAM(S): 90 TABLET ORAL at 06:17

## 2023-06-28 RX ADMIN — Medication 1000 MILLIGRAM(S): at 16:00

## 2023-06-28 RX ADMIN — FENTANYL CITRATE 50 MICROGRAM(S): 50 INJECTION INTRAVENOUS at 16:49

## 2023-06-28 RX ADMIN — GABAPENTIN 300 MILLIGRAM(S): 400 CAPSULE ORAL at 21:09

## 2023-06-28 RX ADMIN — SODIUM CHLORIDE 500 MILLILITER(S): 9 INJECTION INTRAMUSCULAR; INTRAVENOUS; SUBCUTANEOUS at 12:26

## 2023-06-28 RX ADMIN — Medication 0.4 MILLIGRAM(S): at 23:38

## 2023-06-28 RX ADMIN — LIDOCAINE HYDROCHLORIDE AND EPINEPHRINE 1 MILLILITER(S): 10; 10 INJECTION, SOLUTION INFILTRATION; PERINEURAL at 17:40

## 2023-06-28 RX ADMIN — Medication 5 UNIT(S): at 07:53

## 2023-06-28 RX ADMIN — SODIUM CHLORIDE 75 MILLILITER(S): 9 INJECTION INTRAMUSCULAR; INTRAVENOUS; SUBCUTANEOUS at 12:27

## 2023-06-28 RX ADMIN — ATORVASTATIN CALCIUM 80 MILLIGRAM(S): 80 TABLET, FILM COATED ORAL at 21:09

## 2023-06-28 RX ADMIN — Medication 25 MILLIGRAM(S): at 06:17

## 2023-06-28 RX ADMIN — Medication 81 MILLIGRAM(S): at 08:02

## 2023-06-28 RX ADMIN — Medication 0: at 21:09

## 2023-06-28 RX ADMIN — GABAPENTIN 300 MILLIGRAM(S): 400 CAPSULE ORAL at 06:17

## 2023-06-28 RX ADMIN — HEPARIN SODIUM 950 UNIT(S)/HR: 5000 INJECTION INTRAVENOUS; SUBCUTANEOUS at 07:07

## 2023-06-28 RX ADMIN — Medication 400 MILLIGRAM(S): at 15:19

## 2023-06-28 RX ADMIN — SODIUM CHLORIDE 100 MILLILITER(S): 9 INJECTION INTRAMUSCULAR; INTRAVENOUS; SUBCUTANEOUS at 17:06

## 2023-06-28 RX ADMIN — Medication 4: at 07:54

## 2023-06-28 NOTE — DISCHARGE NOTE PROVIDER - CARE PROVIDER_API CALL
Luiza Bunn  [address] [place] [address]  Phone: [phone]  Fax: (   )    -  Scheduled Appointment: 07/07/2023 12:00 AM    Faustino Heck  Cardiovascular Disease  [address] [place], [place] [id number]  Phone: [phone]  Fax: [phone]  Follow Up Time: 2 weeks   Faustino Heck  Cardiovascular Disease  [address] [place], [place] [id number]  Phone: [phone]  Fax: [phone]  Follow Up Time: 2 weeks    Vidal Santiago  Cardiovascular Disease  [address] [place] [address]  Phone: [phone]  Fax: [phone]  Follow Up Time: 1 week    Kenneth Toth  Endocrinology/Metab/Diabetes  [address] [place] [address]  Phone: [phone]  Fax: [phone]  Follow Up Time: 1 week    [facility] Hensley [facility]  Family Medicine  [address] [place] [address]  Phone: [phone]  Fax: [phone]  Follow Up Time: 1 week    Luiza Bunn  [address] [place] [address]  Phone: [phone]  Fax: (   )    -  Scheduled Appointment: 07/07/2023 12:00 AM    Perlman, Craig Douglas  Internal Medicine  [address] [place] [address], [address] [place] [address]  Phone: [phone]  Fax: [phone]  Follow Up Time: 1 week

## 2023-06-28 NOTE — PROGRESS NOTE ADULT - SUBJECTIVE AND OBJECTIVE BOX
[facility] -- Eden Ferris Pannella, Patel, Savella, Goodger  Office # [phone]    Follow Up:  NSTEMI    Subjective/Observations: c/o left sided CP overnight.  No further complaints today.  Comfortable on RA.  NSR on tele with no arrhythmias overrnight    REVIEW OF SYSTEMS: All other review of systems is negative unless indicated above  PAST MEDICAL & SURGICAL HISTORY:  Diabetes mellitus  Hypercholesteremia  No significant past surgical history    MEDICATIONS  (STANDING):  atorvastatin 80 milliGRAM(s) Oral at bedtime  dextrose 5%. 1000 milliLiter(s) (100 mL/Hr) IV Continuous <Continuous>  dextrose 5%. 1000 milliLiter(s) (50 mL/Hr) IV Continuous <Continuous>  dextrose 50% Injectable 25 Gram(s) IV Push once  dextrose 50% Injectable 12.5 Gram(s) IV Push once  dextrose 50% Injectable 25 Gram(s) IV Push once  gabapentin 300 milliGRAM(s) Oral three times a day  glucagon  Injectable 1 milliGRAM(s) IntraMuscular once  heparin  Infusion.  Unit(s)/Hr (8 mL/Hr) IV Continuous <Continuous>  insulin glargine Injectable (LANTUS) 15 Unit(s) SubCutaneous at bedtime  insulin lispro (ADMELOG) corrective regimen sliding scale   SubCutaneous three times a day before meals  insulin lispro (ADMELOG) corrective regimen sliding scale   SubCutaneous at bedtime  insulin lispro Injectable (ADMELOG) 5 Unit(s) SubCutaneous three times a day before meals  losartan 25 milliGRAM(s) Oral daily  metoprolol tartrate 25 milliGRAM(s) Oral every 12 hours  sodium chloride 0.9%. 500 milliLiter(s) (100 mL/Hr) IV Continuous <Continuous>  ticagrelor 90 milliGRAM(s) Oral every 12 hours    MEDICATIONS  (PRN):  acetaminophen     Tablet .. 650 milliGRAM(s) Oral every 6 hours PRN Temp greater or equal to 38C (100.4F), Mild Pain (1 - 3)  dextrose Oral Gel 15 Gram(s) Oral once PRN Blood Glucose LESS THAN 70 milliGRAM(s)/deciliter  heparin   Injectable 4100 Unit(s) IV Push every 6 hours PRN For aPTT less than 40    Allergies    No Known Allergies    Intolerances    Vital Signs Last 24 Hrs  T(C): 36.7 (28 Jun 2023 04:00), Max: 36.8 (27 Jun 2023 10:18)  T(F): 98.1 (28 Jun 2023 04:00), Max: 98.2 (27 Jun 2023 10:18)  HR: 72 (28 Jun 2023 07:00) (60 - 78)  BP: 147/79 (28 Jun 2023 07:00) (97/55 - 149/79)  BP(mean): 107 (28 Jun 2023 07:00) (70 - 109)  RR: 11 (28 Jun 2023 07:00) (9 - 24)  SpO2: 98% (28 Jun 2023 07:00) (93% - 99%)    Parameters below as of 28 Jun 2023 06:00  Patient On (Oxygen Delivery Method): room air    I&O's Summary    27 Jun 2023 07:01  -  28 Jun 2023 07:00  --------------------------------------------------------  IN: 945.5 mL / OUT: 0 mL / NET: 945.5 mL      Weight (kg): 66.9 (06-27 @ 14:25)  PHYSICAL EXAM:  TELE: NSR  Constitutional: NAD, awake and alert, well-developed  HEENT: Moist Mucous Membranes, Anicteric  Pulmonary: Non-labored, breath sounds are clear bilaterally, No wheezing, rales or rhonchi  Cardiovascular: Regular, S1 and S2, No murmurs, rubs, gallops or clicks  Gastrointestinal: Bowel Sounds present, soft, nontender.   Lymph: No peripheral edema. No lymphadenopathy.  Skin: No visible rashes or ulcers.  Psych:  Mood & affect appropriate  LABS: All Labs Reviewed:                        14.5   9.33  )-----------( 180      ( 28 Jun 2023 06:07 )             42.2                         13.9   8.00  )-----------( 180      ( 27 Jun 2023 02:34 )             40.1     28 Jun 2023 06:07    136    |  106    |  16     ----------------------------<  233    4.1     |  26     |  0.80   27 Jun 2023 02:34    133    |  101    |  22     ----------------------------<  368    4.1     |  29     |  1.10     Ca    8.6        28 Jun 2023 06:07  Ca    9.0        27 Jun 2023 02:34    TPro  6.7    /  Alb  3.2    /  TBili  0.5    /  DBili  x      /  AST  29     /  ALT  25     /  AlkPhos  70     28 Jun 2023 06:07  TPro  6.7    /  Alb  3.3    /  TBili  0.3    /  DBili  x      /  AST  16     /  ALT  25     /  AlkPhos  88     27 Jun 2023 02:34    PTT - ( 28 Jun 2023 06:07 )  PTT:45.8 sec  CARDIAC MARKERS ( 28 Jun 2023 06:07 )  x     / x     / 203 U/L / x     / 13.4 ng/mL  CARDIAC MARKERS ( 27 Jun 2023 16:57 )  x     / x     / 138 U/L / x     / 3.8 ng/mL    Ventricular Rate 61 BPM    Atrial Rate 61 BPM    P-R Interval 168 ms    QRS Duration 102 ms    Q-T Interval 410 ms    QTC Calculation(Bazett) 412 ms    P Axis 39 degrees    R Axis -45 degrees    T Axis -15 degrees    Diagnosis Line Normal sinus rhythm  Left axis deviation  Possible Anterior infarct , age undetermined  Abnormal ECG  When compared with ECG of 27-JUN-2023 10:27, (Unconfirmed)  No significant change was found  Confirmed by Ayo Harmon MD ([id number]) on 6/27/2023 3:14:37 PM

## 2023-06-28 NOTE — CONSULT NOTE ADULT - SUBJECTIVE AND OBJECTIVE BOX
Patient is a 52y old  Male who presents with a chief complaint of NSTEMI (28 Jun 2023 08:34)    At beside with : Type:2 DX 1999 year, after dx with DFU. No previous known complications. Endocrine: Dr Bedoya in [place].  Rx home: insulin since 2005- Basaglar pen 30 units HS; admelog pen ISS- about 9-10 units AC  x3d; and januvia 50 mg/d. States does not always take scheduled prescribed doses of insulin. Has glucometer and christiano sensor ("comes off in shower") states has all supplies for testing/administration of insulin.   diabetes education provided verbally, plan to return to video , [employer] handouts and family if available.       HPI:  51yo male with PMH of T2DM on insulin, HTN, HLD who presents to the ED with posterior neck pain radiating to the left chest.  used, ID# [id number]. Pt states around noon yesterday he developed posterior neck pain radiating up to the back of his head. Around 3pm he began to have left sided chest pain while sedentary, intermittent, non-radiating, resolving within a few seconds. Last night around 9pm he developed worsening left sided chest pain with radiation to his neck and left arm, along with diaphoresis and SOB, described it as a choking sensation in his chest/throat. Pt notes he has been having dyspnea on exertion for the past 3mos, went to see his cardiologist and had a stress test done 2 mos ago that he was unable to complete due to dyspnea, was told the test was abnormal but unsure of what the follow up recommendations were. He reports no history of smoking. During time of evaluation patient still reporting mild left sided chest pain, though improved.    In the ED:  Vitals: Tmax 98.1F | HR 86 | /81> 131/79 | RR 18 | SpO2 98% on RA  Labs: trop 14.3 > 310.4, glucose 368, Na 133  CXR: on wet read, grossly clear lungs  ECG: NSR, rate 83, LAD  Received ASA 324mg, heparin gtt, 6u Humulin (27 Jun 2023 08:36)      PAST MEDICAL & SURGICAL HISTORY:  Diabetes mellitus      Hypercholesteremia      No significant past surgical history          Allergies    No Known Allergies    Intolerances        MEDICATIONS  (STANDING):  atorvastatin 80 milliGRAM(s) Oral at bedtime  dextrose 5%. 1000 milliLiter(s) (100 mL/Hr) IV Continuous <Continuous>  dextrose 5%. 1000 milliLiter(s) (50 mL/Hr) IV Continuous <Continuous>  dextrose 50% Injectable 25 Gram(s) IV Push once  dextrose 50% Injectable 12.5 Gram(s) IV Push once  dextrose 50% Injectable 25 Gram(s) IV Push once  gabapentin 300 milliGRAM(s) Oral three times a day  glucagon  Injectable 1 milliGRAM(s) IntraMuscular once  heparin  Infusion.  Unit(s)/Hr (8 mL/Hr) IV Continuous <Continuous>  insulin glargine Injectable (LANTUS) 15 Unit(s) SubCutaneous at bedtime  insulin lispro (ADMELOG) corrective regimen sliding scale   SubCutaneous three times a day before meals  insulin lispro (ADMELOG) corrective regimen sliding scale   SubCutaneous at bedtime  insulin lispro Injectable (ADMELOG) 5 Unit(s) SubCutaneous three times a day before meals  losartan 25 milliGRAM(s) Oral daily  metoprolol tartrate 25 milliGRAM(s) Oral every 12 hours  sodium chloride 0.9%. 500 milliLiter(s) (100 mL/Hr) IV Continuous <Continuous>  ticagrelor 90 milliGRAM(s) Oral every 12 hours

## 2023-06-28 NOTE — PROVIDER CONTACT NOTE (CRITICAL VALUE NOTIFICATION) - DATE AND TIME:
28-Jun-2023 07:40 28-Jun-2023 07:50 decreased balance during turns/losing balance/decreased step length/decreased weight-shifting ability

## 2023-06-28 NOTE — DISCHARGE NOTE PROVIDER - NSDCFUADDINST_GEN_ALL_CORE_FT
Wound Care:   the day AFTER your procedure...     Remove the bandage from the site and gently clean with soap and water then pat dry; leave open to air.     You may take a brief shower     Do NOT apply lotions, creams, powders, ointments, or perfumes to your incision site unless prescribed by your physician     Do NOT soak your procedure site for 1 week (no baths, no pools, no tubs, etc...)     Check  your groin and /or wrist daily. A small amount of bruising, and soreness are normal    ACTIVITY: for 24 hours      - DO NOT DRIVE     - DO NOT make any important decisions or sign legal documents      - DO NOT operate heavy machinery      - you may resume sexual activity in 48 hours, unless otherwise instructed by your cardiologist          If your procedure was done through the WRIST: for the NEXT 3 DAYS:          - avoid pushing, pulling, with that affected wrist (such as pushing up from a seated position)          - avoid repeated movement of that hand and wrist (such as typing or hammering)          - DO NOT LIFT anything more than 5 pounds         If your procedure was done through the GROIN: for the NEXT 5 DAYS          - Limit climbing stairs, DO NOT soak in bathtub or pool          - no strenous activities, pushing, pulling, straining          - Do not lift anything more than 10 pounds     MEDICATION:      Please take your medications as explained to you (found on your discharge paperwork)      If you received a stent, you will be taking medication to KEEP YOUR STENT OPEN. Refer to the "keep your stent open sheet"           You MUST start taking this medication immediately.           Take this medication as prescribed and uniterrupted.            DO NOT STOP taking them for any reason without consulting with your cardiologist first.      **if you have diabetes and take metformin please do not take this medication for 2 days after the procedure. Restart and take as usual starting day 3.    Follow the heart healthy diet recommended by your doctor.   Drink plenty of water for the next 24 hours unless otherwise instructed.  Do not drink any alcoholic beverages for 24 hours (beer, wine, liquor, etc).    If you smoke: STOP SMOKING. Call the [facility] at [phone] for assistance.    CALL your cardiologist/primary care doctor to make a follow-up appointment in 2 WEEKS     **CALL YOUR DOCTOR if you experience     fever, chills, body aches, or severe pain, swelling, redness, heat or yellow discharge at incision site     bleeding or excruciating pain at the procedural site, swelling (golf ball size) at your procedural site     CHEST PAIN     numbness, tingling, temperature change (of your procedural site)     Pain  -you may have pain after your surgery or procedure at the puncture site or in the artery/vein that has been treated.  -take pain medication as directed by your doctor.  call your doctor if your pain is not getting better within 5 days or if it gets worse  -prescription pain medication should be taken with food, and can cause constipation, an over-the-counter softener may be helpful    Nausea  -anesthesia/sedation can upset your stomach  -eat bland foods (Jell-o, crackers, toast) and drink ginger ale if you are nauseated  -drink plenty of fluids such as water or ginger ale (unless instructed otherwise by your doctor)  -if you have nausea or vomiting the day after your procedure, call your doctor    Bleeding  -you may have a small amount of oozing from your surgical or procedural site  -bleeding as the site can be dangerous and should prompt immediate medical attention    Infection  -if you have any of the following signs of infection, call your doctor:       redness, swelling, fever over 101 degrees, thick yellow/white drainage    If you are unable to reach your doctor, you may contact:   Dr Luiza Bunn @ [phone]    **Call 911 immediately if:     - your hand or leg becomes blue, feels cold to touch, or if you have numbness or tingling     - bleeding or swelling from your wrist or groin site cannot be controlled or if area becomes very red or hot to touch     - you have pain, pressure, tightness or burning in your chest, arms, jaw or stomach; shortness of breath; nausea or excessive sweating; lightheadedness; dizziness or a fainting spell; or if you have sudden back or stomach pain     -you have rapid heartbeat or palpitations     - you have bright red blood in large amounts, severe pain at access site (wrist or groin) or significant new swelling at the puncture site

## 2023-06-28 NOTE — CONSULT NOTE ADULT - ASSESSMENT
Physical Exam:   Vital Signs Last 24 Hrs  T(C): 36.5 (28 Jun 2023 08:00), Max: 36.8 (27 Jun 2023 10:18)  T(F): 97.7 (28 Jun 2023 08:00), Max: 98.2 (27 Jun 2023 10:18)  HR: 65 (28 Jun 2023 09:00) (60 - 78)  BP: 101/53 (28 Jun 2023 09:00) (97/55 - 149/79)  BP(mean): 65 (28 Jun 2023 09:00) (65 - 109)  RR: 15 (28 Jun 2023 09:00) (9 - 24)  SpO2: 97% (28 Jun 2023 09:00) (93% - 99%)    Parameters below as of 28 Jun 2023 09:00  Patient On (Oxygen Delivery Method): room air             CAPILLARY BLOOD GLUCOSE      POCT Blood Glucose.: 220 mg/dL (28 Jun 2023 07:37)  POCT Blood Glucose.: 207 mg/dL (27 Jun 2023 21:55)  POCT Blood Glucose.: 248 mg/dL (27 Jun 2023 16:58)  POCT Blood Glucose.: 219 mg/dL (27 Jun 2023 13:12)      Cholesterol, Serum: 113 mg/dL (05.19.21 @ 08:36)     HDL Cholesterol, Serum: 22 mg/dL (05.19.21 @ 08:36)     LDL Cholesterol Calculated: 66 mg/dL (05.19.21 @ 08:36)     DIET: CC  >50%

## 2023-06-28 NOTE — PROGRESS NOTE ADULT - SUBJECTIVE AND OBJECTIVE BOX
INTERVAL HPI/OVERNIGHT EVENTS:  Patient seen and examined at bedside. States he is feeling better today. Patient underwent cardiac cath yesterday with MARCK x2, plan for staged PCI today. Patient does report he had some chest comfort overnight, which has since resolved.    MEDICATIONS  (STANDING):  atorvastatin 80 milliGRAM(s) Oral at bedtime  dextrose 5%. 1000 milliLiter(s) (100 mL/Hr) IV Continuous <Continuous>  dextrose 5%. 1000 milliLiter(s) (50 mL/Hr) IV Continuous <Continuous>  dextrose 50% Injectable 25 Gram(s) IV Push once  dextrose 50% Injectable 12.5 Gram(s) IV Push once  dextrose 50% Injectable 25 Gram(s) IV Push once  gabapentin 300 milliGRAM(s) Oral three times a day  glucagon  Injectable 1 milliGRAM(s) IntraMuscular once  insulin glargine Injectable (LANTUS) 15 Unit(s) SubCutaneous at bedtime  insulin lispro (ADMELOG) corrective regimen sliding scale   SubCutaneous three times a day before meals  insulin lispro (ADMELOG) corrective regimen sliding scale   SubCutaneous at bedtime  insulin lispro Injectable (ADMELOG) 5 Unit(s) SubCutaneous three times a day before meals  losartan 25 milliGRAM(s) Oral daily  metoprolol tartrate 25 milliGRAM(s) Oral every 12 hours  sodium chloride 0.9%. 500 milliLiter(s) (100 mL/Hr) IV Continuous <Continuous>  ticagrelor 90 milliGRAM(s) Oral every 12 hours    MEDICATIONS  (PRN):  acetaminophen     Tablet .. 650 milliGRAM(s) Oral every 6 hours PRN Temp greater or equal to 38C (100.4F), Mild Pain (1 - 3)  dextrose Oral Gel 15 Gram(s) Oral once PRN Blood Glucose LESS THAN 70 milliGRAM(s)/deciliter      Allergies    No Known Allergies    Intolerances      ROS:   CONSTITUTIONAL: No weakness, fevers or chills  EYES/ENT: No visual changes;  No vertigo or throat pain   NECK: No pain or stiffness  RESPIRATORY: No cough, wheezing, hemoptysis; No shortness of breath  CARDIOVASCULAR: No chest pain or palpitations  GASTROINTESTINAL: No abdominal or epigastric pain. No nausea, vomiting, or hematemesis  GENITOURINARY: No dysuria, frequency or hematuria  NEUROLOGICAL: No numbness or weakness  SKIN: No itching, burning, rashes, or lesions   All other review of systems is negative unless indicated above.    Vital Signs Last 24 Hrs  T(C): 36.9 (28 Jun 2023 20:15), Max: 36.9 (28 Jun 2023 20:15)  T(F): 98.5 (28 Jun 2023 20:15), Max: 98.5 (28 Jun 2023 20:15)  HR: 63 (28 Jun 2023 22:00) (63 - 81)  BP: 136/69 (28 Jun 2023 22:00) (97/55 - 156/84)  BP(mean): 96 (28 Jun 2023 22:00) (65 - 107)  RR: 17 (28 Jun 2023 22:00) (11 - 20)  SpO2: 99% (28 Jun 2023 22:00) (93% - 99%)    Parameters below as of 28 Jun 2023 21:00  Patient On (Oxygen Delivery Method): room air        06-27 @ 07:01  -  06-28 @ 07:00  --------------------------------------------------------  IN: 945.5 mL / OUT: 0 mL / NET: 945.5 mL    06-28 @ 07:01  -  06-28 @ 22:20  --------------------------------------------------------  IN: 1268.5 mL / OUT: 800 mL / NET: 468.5 mL      Physical Exam:  General: laying in bed comfortably, NAD  Neurology: A&Ox3, nonfocal, KAUR x 4  Respiratory: CTA B/L  CV: RRR, S1S2, no murmurs, rubs or gallops  Abdominal: Soft, NT, ND +BS  Extremities: No edema, + peripheral pulses      LABS:                        14.5   9.33  )-----------( 180      ( 28 Jun 2023 06:07 )             42.2     06-28    136  |  106  |  16  ----------------------------<  233<H>  4.1   |  26  |  0.80    Ca    8.6      28 Jun 2023 06:07    TPro  6.7  /  Alb  3.2<L>  /  TBili  0.5  /  DBili  x   /  AST  29  /  ALT  25  /  AlkPhos  70  06-28    PTT - ( 28 Jun 2023 06:07 )  PTT:45.8 sec  Urinalysis Basic - ( 28 Jun 2023 06:07 )    Color: x / Appearance: x / SG: x / pH: x  Gluc: 233 mg/dL / Ketone: x  / Bili: x / Urobili: x   Blood: x / Protein: x / Nitrite: x   Leuk Esterase: x / RBC: x / WBC x   Sq Epi: x / Non Sq Epi: x / Bacteria: x        RADIOLOGY & ADDITIONAL TESTS:

## 2023-06-28 NOTE — DISCHARGE NOTE PROVIDER - HOSPITAL COURSE
ADMISSION DATE:  06-27-23    ---  FROM ADMISSION H+P:   HPI:  53yo male with PMH of T2DM on insulin, HTN, HLD who presents to the ED with posterior neck pain radiating to the left chest.  used, ID# [id number]. Pt states around noon yesterday he developed posterior neck pain radiating up to the back of his head. Around 3pm he began to have left sided chest pain while sedentary, intermittent, non-radiating, resolving within a few seconds. Last night around 9pm he developed worsening left sided chest pain with radiation to his neck and left arm, along with diaphoresis and SOB, described it as a choking sensation in his chest/throat. Pt notes he has been having dyspnea on exertion for the past 3mos, went to see his cardiologist and had a stress test done 2 mos ago that he was unable to complete due to dyspnea, was told the test was abnormal but unsure of what the follow up recommendations were. He reports no history of smoking. During time of evaluation patient still reporting mild left sided chest pain, though improved.    In the ED:  Vitals: Tmax 98.1F | HR 86 | /81> 131/79 | RR 18 | SpO2 98% on RA  Labs: trop 14.3 > 310.4, glucose 368, Na 133  CXR: on wet read, grossly clear lungs  ECG: NSR, rate 83, LAD  Received ASA 324mg, heparin gtt, 6u Humulin (27 Jun 2023 08:36)      ---  HOSPITAL COURSE/PERTINENT LABS/PROCEDURES PERFORMED/PENDING TESTS:   53yo male with PMH of T2DM on insulin, HTN, HLD who presents to the ED with posterior neck pain radiating to the left chest, admitted for NSTEMI.  S/p cath, found to have triple vessel disease.  S/p MARCK to mRCA and RPL via right radial artery on 6/27. S/p staged PCI to proximal and medial LAD and pLCx on 6/28. Troponin elevated on admission. Trended to peak. TTE showed EF 53% with mild hypokinesis of basal to mid anterolateral walls, grade 1 DD, no significant valvular disease. Continue DAPT, statin, B-blocker, ARB. A1c- 11.7, endo consulted, DM educator consulted. Pt started on Lantus 25 units. Admelog 7 units premeal.     Patient is stable for discharge as per primary medical team and consultants.    Patient showed improvement throughout hospitalization. Patient was seen and examined on day of discharge. Patient was medically optimized for discharge with close outpatient follow up.      ---  PATIENT CONDITION:  - stable    --  VITALS:   T(C): 36.8 (06-30-23 @ 12:00), Max: 37 (06-30-23 @ 04:00)  HR: 76 (06-30-23 @ 12:00) (65 - 82)  BP: 93/59 (06-30-23 @ 12:00) (92/55 - 125/60)  RR: 17 (06-30-23 @ 12:00) (17 - 27)  SpO2: 98% (06-30-23 @ 12:00) (96% - 98%)    PHYSICAL EXAM ON DAY OF DISCHARGE:  General: laying in bed comfortably, NAD  Neurology: A&Ox3, nonfocal  Respiratory: CTA B/L  CV: RRR, S1S2, no murmurs, rubs or gallops  Abdominal: Soft, NT, ND +BS  Extremities: No edema, + peripheral pulses    ---  CONSULTANTS:   Cardio: Dr. Harmon  Interventional Cardio: Dr. Bunn  Endocrine: Dr. Perlman     ---  ADVANCED CARE PLANNING:  - Code status:      - MOLST completed:      [  ] NO     [  ] YES    ---  TIME SPENT:  I, the attending physician, was physically present for the key portions of the evaluation and management (E/M) service provided. The total amount of time spent reviewing the hospital notes, laboratory values, imaging findings, assessing/counseling the patient, discussing with consultant physicians, social work, nursing staff was -- minutes      
no

## 2023-06-28 NOTE — DISCHARGE NOTE PROVIDER - NSDCMRMEDTOKEN_GEN_ALL_CORE_FT
Admelog 100 units/mL injectable solution: 10 unit(s) injectable 3 times a day  aspirin 81 mg oral tablet: 1 tab(s) orally once a day  Aspirin Enteric Coated 81 mg oral delayed release tablet: 1 tab(s) orally once a day  Aspirin Enteric Coated 81 mg oral delayed release tablet: 1 tab(s) orally once a day  gabapentin 300 mg oral tablet: 1 tab(s) orally 3 times a day  Januvia 50 mg oral tablet: 1 tab(s) orally once a day  Lantus 100 units/mL subcutaneous solution: 30 unit(s) subcutaneous once a day (at bedtime)  losartan 25 mg oral tablet: 1 tab(s) orally once a day  pravastatin 80 mg oral tablet: 1 tab(s) orally once a day (at bedtime)  ticagrelor 90 mg oral tablet: 1 tab(s) orally every 12 hours  ticagrelor 90 mg oral tablet: 1 tab(s) orally every 12 hours   Admelog 100 units/mL injectable solution: 7 unit(s) injectable 3 times a day  alcohol swabs: Apply topically to affected area 4 times a day  aspirin 81 mg oral delayed release tablet: 1 tab(s) orally once a day  atorvastatin 80 mg oral tablet: 1 tab(s) orally once a day (at bedtime)  Freestyle Jett 2 [place]: Dispense 1 [place]  Freestyle Jett 2 sensors: Apply 1 sensor every 14 days  Freestyle Precision Souleymane Strips: Test blood sugar four times a day when you see check blood glucose symbol or when symptoms don&#x27;t match Jett reading.  gabapentin 300 mg oral tablet: 1 tab(s) orally 3 times a day  glucose tablets: Follow instructions on bottle when sugar is low.  lancets: 1 application subcutaneously 4 times a day  losartan 25 mg oral tablet: 1 tab(s) orally once a day  metFORMIN 500 mg oral tablet: 1 tab(s) orally 2 times a day  metoprolol tartrate 25 mg oral tablet: 1 tab(s) orally every 12 hours  test strips (per patient&#x27;s insurance): 1 application subcutaneously 4 times a day. ** Compatible with patient&#x27;s glucometer **  ticagrelor 90 mg oral tablet: 1 tab(s) orally every 12 hours   alcohol swabs: Apply topically to affected area 4 times a day  aspirin 81 mg oral delayed release tablet: 1 tab(s) orally once a day  atorvastatin 80 mg oral tablet: 1 tab(s) orally once a day (at bedtime)  Freestyle Jett 2 [place]: Dispense 1 [place]  Freestyle Jett 2 sensors: Apply 1 sensor every 14 days  Freestyle Precision Souleymane Strips: Test blood sugar four times a day when you see check blood glucose symbol or when symptoms don&#x27;t match Jett reading.  gabapentin 300 mg oral tablet: 1 tab(s) orally 3 times a day  glucometer (per patient&#x27;s insurance): Test blood sugars four times a day. Dispense #1 glucometer.  glucose tablets: Follow instructions on bottle when sugar is low.  HumaLOG KwikPen 100 units/mL injectable solution: 7 unit(s) subcutaneous 3 times a day (with meals) unit(s) subcutaneous 3 times a day (with meals)  Insulin Pen Needles, 4mm: 1 application subcutaneously 4 times a day. ** Use with insulin pen **  lancets: 1 application subcutaneously 4 times a day  Lantus Solostar Pen 100 units/mL subcutaneous solution: 30 unit(s) subcutaneous once a day (at bedtime) unit(s) subcutaneous once a day  losartan 25 mg oral tablet: 1 tab(s) orally once a day  metFORMIN 500 mg oral tablet: 1 tab(s) orally 2 times a day  metoprolol tartrate 25 mg oral tablet: 1 tab(s) orally every 12 hours  test strips (per patient&#x27;s insurance): 1 application subcutaneously 4 times a day. ** Compatible with patient&#x27;s glucometer **  ticagrelor 90 mg oral tablet: 1 tab(s) orally every 12 hours

## 2023-06-28 NOTE — PROGRESS NOTE ADULT - SUBJECTIVE AND OBJECTIVE BOX
Department of Cardiology                                                                     Division of Interventional Cardiology                                                                  [facility]/ [place] [address]                                                                             Telephone: [phone]                                                    Post- Procedure Note: Left Heart Cardiac Catheterization       52y  Male p/w CP on 6/27, admitted with NSTEMI. Brought to cath lab.  < from: Cardiac Catheterization (06.27.23 @ 10:48) >  Cath Lab Report    Diagnostic Cardiologist:       Luiza Bunn MD   Interventional Cardiologist:   Luiza Bunn MD   Referring Physician:           Ayo Harmon MD   Procedures Performed   1.    Arterial Access - Right Radial   2.    Left Heart Cath   3.    Diagnostic Coronary Angiography   4.    PCI: MARCK   5.    PCI: MARCK   6.    IVUS   Indications:               Myocardial infarction without ST elevation (NSTEMI)  Conclusions:   Multivessel CAD, low syntax score. Culprit lesion likely the RPL/RCA.  Additional severe disease noted in the Lcx and severe  ulcerated lesion in the pLAD. - s/p 1 MARCK to the mRCA and 1 MARCK to the RPL. EF 50%.   Plan for staged PCI of the Lcx and LAD in  24-48 hours.   Recommendations:   Aspirin and Brilinta.   Aggressive lipid therapy.   F/u with Dr. Harmon   Acute complication:    No complications   < end of copied text >      Overnight Events: pt with 2/10 CP. ECG unchanged. Started on heparin infusion.      Subjective/ROS: pt resting comfortably now, no c/o offered. Denies CP, palpitations, SOB, N/V, fever/chills, dizziness, weakness, numbness/tingling.      HPI:  51yo male with PMH of T2DM on insulin, HTN, HLD who presents to the ED with posterior neck pain radiating to the left chest.  used, ID# [id number]. Pt states around noon yesterday he developed posterior neck pain radiating up to the back of his head. Around 3pm he began to have left sided chest pain while sedentary, intermittent, non-radiating, resolving within a few seconds. Last night around 9pm he developed worsening left sided chest pain with radiation to his neck and left arm, along with diaphoresis and SOB, described it as a choking sensation in his chest/throat. Pt notes he has been having dyspnea on exertion for the past 3mos, went to see his cardiologist and had a stress test done 2 mos ago that he was unable to complete due to dyspnea, was told the test was abnormal but unsure of what the follow up recommendations were. He reports no history of smoking. During time of evaluation patient still reporting mild left sided chest pain, though improved.    In the ED:  Vitals: Tmax 98.1F | HR 86 | /81> 131/79 | RR 18 | SpO2 98% on RA  Labs: trop 14.3 > 310.4, glucose 368, Na 133  CXR: on wet read, grossly clear lungs  ECG: NSR, rate 83, LAD  Received ASA 324mg, heparin gtt, 6u Humulin (27 Jun 2023 08:36)      PAST MEDICAL & SURGICAL HISTORY:  Diabetes mellitus  HTN  Hypercholesteremia  No significant past surgical history      MEDICATIONS  (STANDING):  atorvastatin 80 milliGRAM(s) Oral at bedtime  gabapentin 300 milliGRAM(s) Oral three times a day  glucagon  Injectable 1 milliGRAM(s) IntraMuscular once  heparin  Infusion.  Unit(s)/Hr (8 mL/Hr) IV Continuous <Continuous>  insulin glargine Injectable (LANTUS) 15 Unit(s) SubCutaneous at bedtime  insulin lispro (ADMELOG) corrective regimen sliding scale   SubCutaneous three times a day before meals  insulin lispro (ADMELOG) corrective regimen sliding scale   SubCutaneous at bedtime  insulin lispro Injectable (ADMELOG) 5 Unit(s) SubCutaneous three times a day before meals  losartan 25 milliGRAM(s) Oral daily  metoprolol tartrate 25 milliGRAM(s) Oral every 12 hours  sodium chloride 0.9%. 500 milliLiter(s) (100 mL/Hr) IV Continuous   ticagrelor 90 milliGRAM(s) Oral every 12 hours    MEDICATIONS  (PRN):  acetaminophen     Tablet .. 650 milliGRAM(s) Oral every 6 hours PRN Temp greater or equal to 38C (100.4F), Mild Pain (1 - 3)  dextrose Oral Gel 15 Gram(s) Oral once PRN Blood Glucose LESS THAN 70 milliGRAM(s)/deciliter  heparin   Injectable 4100 Unit(s) IV Push every 6 hours PRN For aPTT less than 40    No Known Allergies                 14.5   9.33  )-----------( 180      ( 28 Jun 2023 06:07 )             42.2     06-28    136  |  106  |  16  ----------------------------<  233<H>  4.1   |  26  |  0.80    Ca    8.6      28 Jun 2023 06:07    TPro  6.7  /  Alb  3.2<L>  /  TBili  0.5  /  DBili  x   /  AST  29  /  ALT  25  /  AlkPhos  70  06-28    PTT - ( 28 Jun 2023 06:07 )  PTT:45.8 sec      < from: 12 Lead ECG (06.27.23 @ 12:48) >  Ventricular Rate 61 BPM  Atrial Rate 61 BPM  P-R Interval 168 ms  QRS Duration 102 ms  Q-T Interval 410 ms  QTC Calculation(Bazett) 412 ms  P Axis 39 degrees  R Axis -45 degrees  T Axis -15 degrees  Diagnosis Line Normal sinus rhythm  Left axis deviation  Possible Anterior infarct , age undetermined  Abnormal ECG  When compared with ECG of 27-JUN-2023 10:27, (Unconfirmed)  No significant change was found  < end of copied text >      < from: Xray Chest 1 View- PORTABLE-Urgent (06.27.23 @ 02:56) >  IMPRESSION: No active pulmonary disease.  < end of copied text >      Vital Signs Last 24 Hrs  T(C): 36.7 (28 Jun 2023 04:00), Max: 36.8 (27 Jun 2023 10:18)  T(F): 98.1 (28 Jun 2023 04:00), Max: 98.2 (27 Jun 2023 10:18)  HR: 72 (28 Jun 2023 07:00) (60 - 78)  Tele: NSR  BP: 147/79 (28 Jun 2023 07:00) (97/55 - 149/79)  BP(mean): 107 (28 Jun 2023 07:00) (70 - 109)  RR: 11 (28 Jun 2023 07:00) (9 - 24)  SpO2: 98% (28 Jun 2023 07:00) (93% - 99%)    Parameters below as of 28 Jun 2023 06:00  Patient On (Oxygen Delivery Method): room air      Constitutional: NAD  Neuro: A+O x 3, non-focal, speech clear and intact  HEENT: NC/AT, PERRL, EOMI, anicteric sclerae, oral mucosa pink and moist  Neck: supple, no JVD  CV: regular rate, regular rhythm, +S1S2, no murmurs or rub  Pulm/chest: lung sounds CTA and equal bilaterally, no accessory muscle use noted  Abd: soft, NT, ND, +BS  Ext: KAUR x 4, no C/C/E  Access site: R wrist C/D/I/soft without hematoma, distal motor/neuro/circ intact. R radial pulse 2+.  Skin: warm, well perfused  Psych: calm, appropriate affect

## 2023-06-28 NOTE — DISCHARGE NOTE PROVIDER - NSDCFUSCHEDAPPT_GEN_ALL_CORE_FT
Luiza Bunn  [facility]  CARDIOLOGY [address]  Scheduled Appointment: 07/07/2023

## 2023-06-28 NOTE — DISCHARGE NOTE PROVIDER - NSDCCPCAREPLAN_GEN_ALL_CORE_FT
PRINCIPAL DISCHARGE DIAGNOSIS  Diagnosis: NSTEMI (non-ST elevation myocardial infarction)  Assessment and Plan of Treatment:       SECONDARY DISCHARGE DIAGNOSES  Diagnosis: 3-vessel CAD  Assessment and Plan of Treatment:     Diagnosis: DM (diabetes mellitus)  Assessment and Plan of Treatment:     Diagnosis: HLD (hyperlipidemia)  Assessment and Plan of Treatment:     Diagnosis: HTN (hypertension)  Assessment and Plan of Treatment:      PRINCIPAL DISCHARGE DIAGNOSIS  Diagnosis: NSTEMI (non-ST elevation myocardial infarction)  Assessment and Plan of Treatment: You came into the hospital for a heart attack.   You were treated with a cardiac cath and 4 stents were placed.   Please continue to Aspirin 81mg, Brillinta 90mg twice a day, metoprolol 25mg twice a day, losartan 25mg daily, atorvastatin 80mg at bedtime.   Follow-up on Friday July 7th @ 10 am with Dr Bunn for post PCI check ( appt given).  Please follow up with your cardiologist or the cardiologist that saw you in the hospital within 2 weeks of discharge. The names have been provided for you in your discharge paper work.      SECONDARY DISCHARGE DIAGNOSES  Diagnosis: DM (diabetes mellitus)  Assessment and Plan of Treatment: Your HbA1c in the hospital was 11.   You were started on a diabetes regimen.   All of the medications have been provided to you.   Please continue to take lantus 25 units, admelog 7 units three times a day before meals, Januvia 50mg, and metformin 500mg.   Please follow up with your endocrinologist or the endocrinologist that saw you in the hospital within 2 weeks of discharge.

## 2023-06-28 NOTE — DISCHARGE NOTE PROVIDER - PROVIDER TOKENS
FREE:[LAST:[Yvanis],FIRST:[Luiza],PHONE:[phone]],FAX:[(   )    -],ADDRESS:[[address] [place] [address]],SCHEDULEDAPPT:[07/07/2023],SCHEDULEDAPPTTIME:[12:00 AM]],PROVIDER:[TOKEN:[[id number]],FOLLOWUP:[2 weeks]] PROVIDER:[TOKEN:[[id number]],FOLLOWUP:[2 weeks]],PROVIDER:[TOKEN:[[id number]],FOLLOWUP:[1 week]],PROVIDER:[TOKEN:[[id number]],FOLLOWUP:[1 week]],PROVIDER:[TOKEN:[[id number]],FOLLOWUP:[1 week]],FREE:[LAST:[Boutis],FIRST:[Lawrences],PHONE:[phone]],FAX:[(   )    -],ADDRESS:[[address] [place] [address]],SCHEDULEDAPPT:[07/07/2023],SCHEDULEDAPPTTIME:[12:00 AM]],PROVIDER:[TOKEN:[[id number]],FOLLOWUP:[1 week]]

## 2023-06-28 NOTE — DISCHARGE NOTE PROVIDER - ATTENDING DISCHARGE PHYSICAL EXAMINATION:
VITALS:   T(C): 36.8 (06-30-23 @ 12:00), Max: 37 (06-30-23 @ 04:00)  HR: 76 (06-30-23 @ 12:00) (65 - 82)  BP: 93/59 (06-30-23 @ 12:00) (92/55 - 125/60)  RR: 17 (06-30-23 @ 12:00) (17 - 27)  SpO2: 98% (06-30-23 @ 12:00) (96% - 98%)    PHYSICAL EXAM ON DAY OF DISCHARGE:  General: laying in bed comfortably, NAD  Neurology: A&Ox3, nonfocal  Respiratory: CTA B/L  CV: RRR, S1S2, no murmurs, rubs or gallops  Abdominal: Soft, NT, ND +BS  Extremities: No edema, + peripheral pulses

## 2023-06-28 NOTE — CONSULT NOTE ADULT - SUBJECTIVE AND OBJECTIVE BOX
Patient is a 52y old  Male who presents with a chief complaint of NSTEMI (28 Jun 2023 13:13)      Reason For Consult: dm2 uncontrolled    HPI:  51yo male with PMH of T2DM on insulin, HTN, HLD who presents to the ED with posterior neck pain radiating to the left chest.  used, ID# [id number]. Pt states around noon yesterday he developed posterior neck pain radiating up to the back of his head. Around 3pm he began to have left sided chest pain while sedentary, intermittent, non-radiating, resolving within a few seconds. Last night around 9pm he developed worsening left sided chest pain with radiation to his neck and left arm, along with diaphoresis and SOB, described it as a choking sensation in his chest/throat. Pt notes he has been having dyspnea on exertion for the past 3mos, went to see his cardiologist and had a stress test done 2 mos ago that he was unable to complete due to dyspnea, was told the test was abnormal but unsure of what the follow up recommendations were. He reports no history of smoking. During time of evaluation patient still reporting mild left sided chest pain, though improved.    In the ED:  Vitals: Tmax 98.1F | HR 86 | /81> 131/79 | RR 18 | SpO2 98% on RA  Labs: trop 14.3 > 310.4, glucose 368, Na 133  CXR: on wet read, grossly clear lungs  ECG: NSR, rate 83, LAD  Received ASA 324mg, heparin gtt, 6u Humulin (27 Jun 2023 08:36)      PAST MEDICAL & SURGICAL HISTORY:  Diabetes mellitus      Hypercholesteremia      No significant past surgical history          FAMILY HISTORY:  FH: diabetes mellitus          Social History:    MEDICATIONS  (STANDING):  atorvastatin 80 milliGRAM(s) Oral at bedtime  dextrose 5%. 1000 milliLiter(s) (100 mL/Hr) IV Continuous <Continuous>  dextrose 5%. 1000 milliLiter(s) (50 mL/Hr) IV Continuous <Continuous>  dextrose 50% Injectable 25 Gram(s) IV Push once  dextrose 50% Injectable 12.5 Gram(s) IV Push once  dextrose 50% Injectable 25 Gram(s) IV Push once  gabapentin 300 milliGRAM(s) Oral three times a day  glucagon  Injectable 1 milliGRAM(s) IntraMuscular once  insulin glargine Injectable (LANTUS) 15 Unit(s) SubCutaneous at bedtime  insulin lispro (ADMELOG) corrective regimen sliding scale   SubCutaneous three times a day before meals  insulin lispro (ADMELOG) corrective regimen sliding scale   SubCutaneous at bedtime  insulin lispro Injectable (ADMELOG) 5 Unit(s) SubCutaneous three times a day before meals  losartan 25 milliGRAM(s) Oral daily  metoprolol tartrate 25 milliGRAM(s) Oral every 12 hours  sodium chloride 0.9%. 500 milliLiter(s) (100 mL/Hr) IV Continuous <Continuous>  ticagrelor 90 milliGRAM(s) Oral every 12 hours    MEDICATIONS  (PRN):  acetaminophen     Tablet .. 650 milliGRAM(s) Oral every 6 hours PRN Temp greater or equal to 38C (100.4F), Mild Pain (1 - 3)  dextrose Oral Gel 15 Gram(s) Oral once PRN Blood Glucose LESS THAN 70 milliGRAM(s)/deciliter        T(C): 36.9 (06-28-23 @ 20:15), Max: 36.9 (06-28-23 @ 20:15)  HR: 79 (06-28-23 @ 20:00) (65 - 81)  BP: 131/68 (06-28-23 @ 20:00) (97/55 - 156/84)  RR: 17 (06-28-23 @ 20:00) (11 - 20)  SpO2: 99% (06-28-23 @ 20:00) (93% - 99%)  Wt(kg): --    PHYSICAL EXAM:  CHEST/LUNG: Clear to percussion bilaterally; No rales, rhonchi, wheezing, or rubs  HEART: Regular rate and rhythm; No murmurs, rubs, or gallops  ABDOMEN: Soft, Nontender, Nondistended; Bowel sounds present  EXTREMITIES:  2+ Peripheral Pulses, No clubbing, cyanosis, or edema  SKIN: No rashes or lesions    CAPILLARY BLOOD GLUCOSE      POCT Blood Glucose.: 175 mg/dL (28 Jun 2023 16:05)  POCT Blood Glucose.: 179 mg/dL (28 Jun 2023 13:30)  POCT Blood Glucose.: 220 mg/dL (28 Jun 2023 07:37)  POCT Blood Glucose.: 207 mg/dL (27 Jun 2023 21:55)                            14.5   9.33  )-----------( 180      ( 28 Jun 2023 06:07 )             42.2       CMP:  06-28 @ 06:07  SGPT 25  Albumin 3.2   Alk Phos 70   Anion Gap 4   SGOT 29   Total Bili 0.5   BUN 16   Calcium Total 8.6   CO2 26   Chloride 106   Creatinine 0.80   eGFR if AA --   eGFR if non AA --   Glucose 233   Potassium 4.1   Protein 6.7   Sodium 136      Thyroid Function Tests:  06-28 @ 06:07 TSH 2.05 FreeT4 -- T3 -- Anti TPO -- Anti Thyroglobulin Ab -- TSI --      Diabetes Tests:       Radiology:

## 2023-06-28 NOTE — CHART NOTE - NSCHARTNOTEFT_GEN_A_CORE
Department of Cardiology                                                                     Division of Interventional Cardiology                                                                  [facility]/ [place] [address]                                                                             Telephone: [phone]                                                    Post- Procedure Note: Left Heart Cardiac Catheterization       52y  Male is now s/p left heart catheterization with MARCK x 1 to pLAD, mLAD, and pCirc      Subjective/ROS: + chest pain. Denies palpitations, SOB, N/V, fever/chills, dizziness, weakness, numbness/tingling.      HPI:  53yo male with PMH of T2DM on insulin, HTN, HLD who presents to the ED with posterior neck pain radiating to the left chest.  used, ID# [id number]. Pt states around noon yesterday he developed posterior neck pain radiating up to the back of his head. Around 3pm he began to have left sided chest pain while sedentary, intermittent, non-radiating, resolving within a few seconds. Last night around 9pm he developed worsening left sided chest pain with radiation to his neck and left arm, along with diaphoresis and SOB, described it as a choking sensation in his chest/throat. Pt notes he has been having dyspnea on exertion for the past 3mos, went to see his cardiologist and had a stress test done 2 mos ago that he was unable to complete due to dyspnea, was told the test was abnormal but unsure of what the follow up recommendations were. He reports no history of smoking. During time of evaluation patient still reporting mild left sided chest pain, though improved.    In the ED:  Vitals: Tmax 98.1F | HR 86 | /81> 131/79 | RR 18 | SpO2 98% on RA  Labs: trop 14.3 > 310.4, glucose 368, Na 133  CXR: on wet read, grossly clear lungs  ECG: NSR, rate 83, LAD  Received ASA 324mg, heparin gtt, 6u Humulin (27 Jun 2023 08:36)      PAST MEDICAL & SURGICAL HISTORY:  Diabetes mellitus  HTN  Hypercholesteremia  No significant past surgical history      MEDICATIONS  (STANDING):  atorvastatin 80 milliGRAM(s) Oral at bedtime  gabapentin 300 milliGRAM(s) Oral three times a day  insulin glargine Injectable (LANTUS) 15 Unit(s) SubCutaneous at bedtime  insulin lispro (ADMELOG) corrective regimen sliding scale   SubCutaneous three times a day before meals  insulin lispro (ADMELOG) corrective regimen sliding scale   SubCutaneous at bedtime  insulin lispro Injectable (ADMELOG) 5 Unit(s) SubCutaneous three times a day before meals  losartan 25 milliGRAM(s) Oral daily  metoprolol tartrate 25 milliGRAM(s) Oral every 12 hours  sodium chloride 0.9%. 500 milliLiter(s) (100 mL/Hr) IV Continuous <Continuous>  ticagrelor 90 milliGRAM(s) Oral every 12 hours    MEDICATIONS  (PRN):  acetaminophen     Tablet .. 650 milliGRAM(s) Oral every 6 hours PRN Temp greater or equal to 38C (100.4F), Mild Pain (1 - 3)  dextrose Oral Gel 15 Gram(s) Oral once PRN Blood Glucose LESS THAN 70 milliGRAM(s)/deciliter    No Known Allergies                          14.5   9.33  )-----------( 180      ( 28 Jun 2023 06:07 )             42.2     06-28    136  |  106  |  16  ----------------------------<  233<H>  4.1   |  26  |  0.80    Ca    8.6      28 Jun 2023 06:07    TPro  6.7  /  Alb  3.2<L>  /  TBili  0.5  /  DBili  x   /  AST  29  /  ALT  25  /  AlkPhos  70  06-28    PTT - ( 28 Jun 2023 06:07 )  PTT:45.8 sec      Vital Signs Last 24 Hrs  T(C): 36.4 (28 Jun 2023 15:11), Max: 36.7 (28 Jun 2023 04:00)  T(F): 97.6 (28 Jun 2023 15:11), Max: 98.1 (28 Jun 2023 04:00)  HR: 68 (28 Jun 2023 15:11) (62 - 72), NSR  BP: 142/80 (28 Jun 2023 15:11) (97/55 - 147/79)  BP(mean): 87 (28 Jun 2023 11:00) (65 - 107)  RR: 15 (28 Jun 2023 15:11) (11 - 24)  SpO2: 98% (28 Jun 2023 15:11) (93% - 99%)    Parameters below as of 28 Jun 2023 15:11  Patient On (Oxygen Delivery Method): room air      Post PCI ECG: SR, no changes from prior      Constitutional: NAD  Neuro: sleepy but easily arousable, A+O x 3, non-focal, speech clear and intact  HEENT: NC/AT, PERRL, EOMI, anicteric sclerae, oral mucosa pink and moist  Neck: supple, no JVD  CV: regular rate, regular rhythm, +S1S2, no murmurs or rub  Pulm/chest: lung sounds CTA and equal bilaterally, no accessory muscle use noted  Abd: soft, NT, ND, +BS  Ext: KAUR x 4, no C/C/E  Access site: R groin C/D/I/soft without hematoma, distal motor/neuro/circ intact. R DP pulse 1+.  Skin: warm, well perfused  Psych: calm, appropriate affect      Pt is already in-patient, transfer back to [facility] for observation once post C recovery completed  post cath/PCI routine VS, access site, neuro-vascular monitoring and R groin/RLE post access precautions ordered  post cath hydration as ordered  Bedrest. Post angioseal, may get OOB in 2 hours provided groin / hemodynamics remain stable   EKG post cath done  f/u labs and EKG in am  continue dual anti platelet therapy with aspirin & ticagrelor  Pt education provided/reinforced re: importance of strict adherence to uninterrupted DAPT for minimum of 3-12 months (cardiologist will determine duration)  cardiac rehab info provided/referral and communication to cardiac rehab completed  continue statin, beta blocker, ACEI/ARB  may resume prior diet  may resume DVT prophylaxis tomorrow  Lifestyle modifications discussed to reduce cardiovascular risk factors including weight reduction, smoking cessation (referral provided if applicable), medication compliance, and routine follow up with Cardiologist to track your BMI, cholesterol, and glucose levels.   nutrition and CDE in to see pt for further education  Discharge in am if stable  follow-up on 7/7 at 10am with Dr Bunn for post PCI check  follow-up in 2 weeks with outpatient/referring cardiologist  continue home medication regimen as appropriate  remainder of plan per non-interventional cardiology / primary team  above d/w hospitalist by Dr Bunn Department of Cardiology                                                                     Division of Interventional Cardiology                                                                  [facility]/ [place] [address]                                                                             Telephone: [phone]                                                    Post- Procedure Note: Left Heart Cardiac Catheterization       52y  Male is now s/p left heart catheterization with MARCK x 1 to pLAD, mLAD, and pCirc    < from: Cardiac Catheterization (06.28.23 @ 13:50) >  Cath Lab Report    Interventional Cardiologist:   Luiza Bunn MD   Referring Physician:           Ayo Harmon MD   Procedures Performed   1.    Ultrasound Guided Access   2.    Arterial Access - Right Femoral   3.    PCI: MARCK   4.    IVUS   5.    PCI: MARCK   6.    AngioSeal   Indications:               Myocardial infarction without ST elevation (NSTEMI)  Conclusions:   Successful MARCK placementto the pLCx and the pLAD and mLAD. IVUS was used for both coronary arteries during the intervention to optimize the stent size.    Recommendations:   DAPT for 1 year. Aggressive lipid and DM rx. F/u with Dr. Harmon.   Acute complication:    No complications   < end of copied text >      Subjective/ROS: + chest pain. Denies palpitations, SOB, N/V, fever/chills, dizziness, weakness, numbness/tingling.      HPI:  53yo male with PMH of T2DM on insulin, HTN, HLD who presents to the ED with posterior neck pain radiating to the left chest.  used, ID# [id number]. Pt states around noon yesterday he developed posterior neck pain radiating up to the back of his head. Around 3pm he began to have left sided chest pain while sedentary, intermittent, non-radiating, resolving within a few seconds. Last night around 9pm he developed worsening left sided chest pain with radiation to his neck and left arm, along with diaphoresis and SOB, described it as a choking sensation in his chest/throat. Pt notes he has been having dyspnea on exertion for the past 3mos, went to see his cardiologist and had a stress test done 2 mos ago that he was unable to complete due to dyspnea, was told the test was abnormal but unsure of what the follow up recommendations were. He reports no history of smoking. During time of evaluation patient still reporting mild left sided chest pain, though improved.    In the ED:  Vitals: Tmax 98.1F | HR 86 | /81> 131/79 | RR 18 | SpO2 98% on RA  Labs: trop 14.3 > 310.4, glucose 368, Na 133  CXR: on wet read, grossly clear lungs  ECG: NSR, rate 83, LAD  Received ASA 324mg, heparin gtt, 6u Humulin (27 Jun 2023 08:36)      PAST MEDICAL & SURGICAL HISTORY:  Diabetes mellitus  HTN  Hypercholesteremia  No significant past surgical history      MEDICATIONS  (STANDING):  atorvastatin 80 milliGRAM(s) Oral at bedtime  gabapentin 300 milliGRAM(s) Oral three times a day  insulin glargine Injectable (LANTUS) 15 Unit(s) SubCutaneous at bedtime  insulin lispro (ADMELOG) corrective regimen sliding scale   SubCutaneous three times a day before meals  insulin lispro (ADMELOG) corrective regimen sliding scale   SubCutaneous at bedtime  insulin lispro Injectable (ADMELOG) 5 Unit(s) SubCutaneous three times a day before meals  losartan 25 milliGRAM(s) Oral daily  metoprolol tartrate 25 milliGRAM(s) Oral every 12 hours  sodium chloride 0.9%. 500 milliLiter(s) (100 mL/Hr) IV Continuous <Continuous>  ticagrelor 90 milliGRAM(s) Oral every 12 hours    MEDICATIONS  (PRN):  acetaminophen     Tablet .. 650 milliGRAM(s) Oral every 6 hours PRN Temp greater or equal to 38C (100.4F), Mild Pain (1 - 3)  dextrose Oral Gel 15 Gram(s) Oral once PRN Blood Glucose LESS THAN 70 milliGRAM(s)/deciliter    No Known Allergies                          14.5   9.33  )-----------( 180      ( 28 Jun 2023 06:07 )             42.2     06-28    136  |  106  |  16  ----------------------------<  233<H>  4.1   |  26  |  0.80    Ca    8.6      28 Jun 2023 06:07    TPro  6.7  /  Alb  3.2<L>  /  TBili  0.5  /  DBili  x   /  AST  29  /  ALT  25  /  AlkPhos  70  06-28    PTT - ( 28 Jun 2023 06:07 )  PTT:45.8 sec      Vital Signs Last 24 Hrs  T(C): 36.4 (28 Jun 2023 15:11), Max: 36.7 (28 Jun 2023 04:00)  T(F): 97.6 (28 Jun 2023 15:11), Max: 98.1 (28 Jun 2023 04:00)  HR: 68 (28 Jun 2023 15:11) (62 - 72), NSR  BP: 142/80 (28 Jun 2023 15:11) (97/55 - 147/79)  BP(mean): 87 (28 Jun 2023 11:00) (65 - 107)  RR: 15 (28 Jun 2023 15:11) (11 - 24)  SpO2: 98% (28 Jun 2023 15:11) (93% - 99%)    Parameters below as of 28 Jun 2023 15:11  Patient On (Oxygen Delivery Method): room air      Post PCI ECG: SR, no changes from prior      Constitutional: NAD  Neuro: sleepy but easily arousable, A+O x 3, non-focal, speech clear and intact  HEENT: NC/AT, PERRL, EOMI, anicteric sclerae, oral mucosa pink and moist  Neck: supple, no JVD  CV: regular rate, regular rhythm, +S1S2, no murmurs or rub  Pulm/chest: lung sounds CTA and equal bilaterally, no accessory muscle use noted  Abd: soft, NT, ND, +BS  Ext: KAUR x 4, no C/C/E  Access site: R groin C/D/I/soft without hematoma, distal motor/neuro/circ intact. R DP pulse 1+.  Skin: warm, well perfused  Psych: calm, appropriate affect        A/P: 52 year old male with uncontrolled DM (A1c 11.2), HTN, HLD.  6/27 p/w CP. Admitted with STEMI. [facility] with multivessel CAD. s/p MARCK to RCA and RPL.  6/28 s/p [facility] with MARCK to pLAD, mLAD, pCirc.    Pt is already in-patient, transfer back to [facility] for observation once post C recovery completed  post cath/PCI routine VS, access site, neuro-vascular monitoring and R groin/RLE post access precautions ordered  post cath hydration as ordered  Bedrest. Post angioseal, may get OOB in 2 hours provided groin / hemodynamics remain stable   EKG post cath done  f/u labs and EKG in am  continue dual anti platelet therapy with aspirin & ticagrelor  Pt education provided/reinforced re: importance of strict adherence to uninterrupted DAPT for 12 months   cardiac rehab info provided/referral and communication to cardiac rehab completed  continue statin, beta blocker, ACEI/ARB  may resume prior diet  may resume DVT prophylaxis tomorrow  nutrition and CDE in to see pt for further education  Discharge in am if stable  follow-up on 7/7 at 10am with Dr Bunn for post PCI check  follow-up in 2 weeks with outpatient/referring cardiologist  continue home medication regimen as appropriate  remainder of plan per non-interventional cardiology / primary team  above d/w hospitalist by Dr Bunn

## 2023-06-28 NOTE — CONSULT NOTE ADULT - PROBLEM SELECTOR RECOMMENDATION 9
Type 2 A1c 11.2% adm ACS  Recommend endocrine-Perlman on consult  FU appt: TBA  DSC recommendations: return to home modified regimen and glucose monitoring  diabetes education provided  Diabetes support info and cell # [phone] given   Goal 100-180 mg/dL; 140-180 mg/dL in critical care areas
cont lantus 15 units qhs  cont admelog 5 units 3x/day before meals  cont mod dose admelog corrective scale coverage qac/qhs  goal bg 100-180 in hosp setting

## 2023-06-28 NOTE — CONSULT NOTE ADULT - CONSULT REASON
Chest pain
NSTEMI / eval for left heart catheterization
dm2 uncontrolled
52y A1C with Estimated Average Glucose Result: 11.2 % (06-28-23 @ 06:07)   diabetes mellitus uncontrolled type 2

## 2023-06-28 NOTE — DISCHARGE NOTE PROVIDER - NSDCCPTREATMENT_GEN_ALL_CORE_FT
PRINCIPAL PROCEDURE  Procedure: Catheterization, heart, left, with intravascular ultrasound  Findings and Treatment:       SECONDARY PROCEDURE  Procedure: Insertion of single drug eluting stent into right coronary artery  Findings and Treatment:     Procedure: Insertion, stent, drug eluting, coronary artery, left circumflex, initial  Findings and Treatment:     Procedure: Insertion of single drug eluting stent into right coronary artery  Findings and Treatment: RPL    Procedure: Initial percutaneous transluminal insertion of drug eluting stent into left anterior descending coronary artery  Findings and Treatment:

## 2023-06-28 NOTE — CHART NOTE - NSCHARTNOTEFT_GEN_A_CORE
patient's DAPT submitted as follows:    aspirin 81 mg PO daily x 30 days sent to [facility] in [place] ("meds to beds" program)  ticagrelor 90 mg PO Q12H x 30 days sent to [facility] in [place] ("meds to beds" program)    aspirin 81 mg PO daily x 90 days with 3 refills sent to patient's preferred pharmacy on record ([place], [place])  ticagrelor 90 mg PO Q12H x 90 days with 3 refills sent to patient's preferred pharmacy on record ([place], [place])

## 2023-06-29 LAB
ANION GAP SERPL CALC-SCNC: 5 MMOL/L — SIGNIFICANT CHANGE UP (ref 5–17)
ANION GAP SERPL CALC-SCNC: 6 MMOL/L — SIGNIFICANT CHANGE UP (ref 5–17)
BUN SERPL-MCNC: 14 MG/DL — SIGNIFICANT CHANGE UP (ref 7–23)
BUN SERPL-MCNC: 15 MG/DL — SIGNIFICANT CHANGE UP (ref 7–23)
CALCIUM SERPL-MCNC: 8.5 MG/DL — SIGNIFICANT CHANGE UP (ref 8.5–10.1)
CALCIUM SERPL-MCNC: 8.8 MG/DL — SIGNIFICANT CHANGE UP (ref 8.5–10.1)
CHLORIDE SERPL-SCNC: 105 MMOL/L — SIGNIFICANT CHANGE UP (ref 96–108)
CHLORIDE SERPL-SCNC: 105 MMOL/L — SIGNIFICANT CHANGE UP (ref 96–108)
CK MB BLD-MCNC: 5.5 % — HIGH (ref 0–3.5)
CK MB BLD-MCNC: 7.1 % — HIGH (ref 0–3.5)
CK MB BLD-MCNC: 7.1 % — HIGH (ref 0–3.5)
CK MB CFR SERPL CALC: 44.8 NG/ML — HIGH (ref 0–3.6)
CK MB CFR SERPL CALC: 49.5 NG/ML — HIGH (ref 0–3.6)
CK MB CFR SERPL CALC: 65.5 NG/ML — HIGH (ref 0–3.6)
CK SERPL-CCNC: 700 U/L — HIGH (ref 26–308)
CK SERPL-CCNC: 819 U/L — HIGH (ref 26–308)
CK SERPL-CCNC: 924 U/L — HIGH (ref 26–308)
CO2 SERPL-SCNC: 24 MMOL/L — SIGNIFICANT CHANGE UP (ref 22–31)
CO2 SERPL-SCNC: 25 MMOL/L — SIGNIFICANT CHANGE UP (ref 22–31)
CREAT SERPL-MCNC: 0.76 MG/DL — SIGNIFICANT CHANGE UP (ref 0.5–1.3)
CREAT SERPL-MCNC: 0.83 MG/DL — SIGNIFICANT CHANGE UP (ref 0.5–1.3)
EGFR: 107 ML/MIN/1.73M2 — SIGNIFICANT CHANGE UP
EGFR: 110 ML/MIN/1.73M2 — SIGNIFICANT CHANGE UP
GLUCOSE SERPL-MCNC: 205 MG/DL — HIGH (ref 70–99)
GLUCOSE SERPL-MCNC: 248 MG/DL — HIGH (ref 70–99)
HCT VFR BLD CALC: 42.7 % — SIGNIFICANT CHANGE UP (ref 39–50)
HCT VFR BLD CALC: 42.9 % — SIGNIFICANT CHANGE UP (ref 39–50)
HGB BLD-MCNC: 14.6 G/DL — SIGNIFICANT CHANGE UP (ref 13–17)
HGB BLD-MCNC: 14.8 G/DL — SIGNIFICANT CHANGE UP (ref 13–17)
MCHC RBC-ENTMCNC: 29.3 PG — SIGNIFICANT CHANGE UP (ref 27–34)
MCHC RBC-ENTMCNC: 29.5 PG — SIGNIFICANT CHANGE UP (ref 27–34)
MCHC RBC-ENTMCNC: 34.2 GM/DL — SIGNIFICANT CHANGE UP (ref 32–36)
MCHC RBC-ENTMCNC: 34.5 GM/DL — SIGNIFICANT CHANGE UP (ref 32–36)
MCV RBC AUTO: 85.6 FL — SIGNIFICANT CHANGE UP (ref 80–100)
MCV RBC AUTO: 85.7 FL — SIGNIFICANT CHANGE UP (ref 80–100)
NRBC # BLD: 0 /100 WBCS — SIGNIFICANT CHANGE UP (ref 0–0)
NRBC # BLD: 0 /100 WBCS — SIGNIFICANT CHANGE UP (ref 0–0)
PLATELET # BLD AUTO: 176 K/UL — SIGNIFICANT CHANGE UP (ref 150–400)
PLATELET # BLD AUTO: 189 K/UL — SIGNIFICANT CHANGE UP (ref 150–400)
POTASSIUM SERPL-MCNC: 4.1 MMOL/L — SIGNIFICANT CHANGE UP (ref 3.5–5.3)
POTASSIUM SERPL-MCNC: 4.3 MMOL/L — SIGNIFICANT CHANGE UP (ref 3.5–5.3)
POTASSIUM SERPL-SCNC: 4.1 MMOL/L — SIGNIFICANT CHANGE UP (ref 3.5–5.3)
POTASSIUM SERPL-SCNC: 4.3 MMOL/L — SIGNIFICANT CHANGE UP (ref 3.5–5.3)
RBC # BLD: 4.98 M/UL — SIGNIFICANT CHANGE UP (ref 4.2–5.8)
RBC # BLD: 5.01 M/UL — SIGNIFICANT CHANGE UP (ref 4.2–5.8)
RBC # FLD: 12.5 % — SIGNIFICANT CHANGE UP (ref 10.3–14.5)
RBC # FLD: 12.7 % — SIGNIFICANT CHANGE UP (ref 10.3–14.5)
SODIUM SERPL-SCNC: 134 MMOL/L — LOW (ref 135–145)
SODIUM SERPL-SCNC: 136 MMOL/L — SIGNIFICANT CHANGE UP (ref 135–145)
TROPONIN I, HIGH SENSITIVITY RESULT: 9929.7 NG/L — HIGH
TROPONIN I, HIGH SENSITIVITY RESULT: HIGH NG/L
WBC # BLD: 11.18 K/UL — HIGH (ref 3.8–10.5)
WBC # BLD: 13.47 K/UL — HIGH (ref 3.8–10.5)
WBC # FLD AUTO: 11.18 K/UL — HIGH (ref 3.8–10.5)
WBC # FLD AUTO: 13.47 K/UL — HIGH (ref 3.8–10.5)

## 2023-06-29 PROCEDURE — 93010 ELECTROCARDIOGRAM REPORT: CPT

## 2023-06-29 PROCEDURE — 99233 SBSQ HOSP IP/OBS HIGH 50: CPT

## 2023-06-29 RX ORDER — INSULIN LISPRO 100/ML
10 VIAL (ML) SUBCUTANEOUS
Refills: 0 | DISCHARGE

## 2023-06-29 RX ORDER — INSULIN GLARGINE 100 [IU]/ML
20 INJECTION, SOLUTION SUBCUTANEOUS AT BEDTIME
Refills: 0 | Status: DISCONTINUED | OUTPATIENT
Start: 2023-06-29 | End: 2023-06-29

## 2023-06-29 RX ORDER — ISOPROPYL ALCOHOL, BENZOCAINE .7; .06 ML/ML; ML/ML
0 SWAB TOPICAL
Qty: 100 | Refills: 1
Start: 2023-06-29

## 2023-06-29 RX ORDER — INSULIN GLARGINE 100 [IU]/ML
30 INJECTION, SOLUTION SUBCUTANEOUS
Qty: 10 | Refills: 0
Start: 2023-06-29 | End: 2023-07-28

## 2023-06-29 RX ORDER — INSULIN LISPRO 100/ML
10 VIAL (ML) SUBCUTANEOUS
Qty: 1 | Refills: 0
Start: 2023-06-29 | End: 2023-07-28

## 2023-06-29 RX ORDER — FENTANYL CITRATE 50 UG/ML
25 INJECTION INTRAVENOUS EVERY 4 HOURS
Refills: 0 | Status: DISCONTINUED | OUTPATIENT
Start: 2023-06-29 | End: 2023-06-30

## 2023-06-29 RX ORDER — NITROGLYCERIN 6.5 MG
0.4 CAPSULE, EXTENDED RELEASE ORAL
Refills: 0 | Status: DISCONTINUED | OUTPATIENT
Start: 2023-06-29 | End: 2023-06-30

## 2023-06-29 RX ORDER — METFORMIN HYDROCHLORIDE 850 MG/1
1 TABLET ORAL
Qty: 60 | Refills: 0
Start: 2023-06-29 | End: 2023-07-28

## 2023-06-29 RX ORDER — ASPIRIN/CALCIUM CARB/MAGNESIUM 324 MG
1 TABLET ORAL
Qty: 0 | Refills: 0 | DISCHARGE
Start: 2023-06-29

## 2023-06-29 RX ORDER — ASPIRIN/CALCIUM CARB/MAGNESIUM 324 MG
1 TABLET ORAL
Refills: 0 | DISCHARGE

## 2023-06-29 RX ORDER — INSULIN GLARGINE 100 [IU]/ML
25 INJECTION, SOLUTION SUBCUTANEOUS AT BEDTIME
Refills: 0 | Status: DISCONTINUED | OUTPATIENT
Start: 2023-06-29 | End: 2023-06-30

## 2023-06-29 RX ORDER — ATORVASTATIN CALCIUM 80 MG/1
1 TABLET, FILM COATED ORAL
Qty: 30 | Refills: 0
Start: 2023-06-29 | End: 2023-07-28

## 2023-06-29 RX ORDER — INSULIN GLARGINE 100 [IU]/ML
30 INJECTION, SOLUTION SUBCUTANEOUS
Refills: 0 | DISCHARGE

## 2023-06-29 RX ORDER — METOPROLOL TARTRATE 50 MG
1 TABLET ORAL
Qty: 60 | Refills: 0
Start: 2023-06-29 | End: 2023-07-28

## 2023-06-29 RX ADMIN — TICAGRELOR 90 MILLIGRAM(S): 90 TABLET ORAL at 17:16

## 2023-06-29 RX ADMIN — TICAGRELOR 90 MILLIGRAM(S): 90 TABLET ORAL at 05:40

## 2023-06-29 RX ADMIN — Medication 6: at 17:15

## 2023-06-29 RX ADMIN — Medication 5 UNIT(S): at 12:42

## 2023-06-29 RX ADMIN — Medication 81 MILLIGRAM(S): at 11:22

## 2023-06-29 RX ADMIN — FENTANYL CITRATE 25 MICROGRAM(S): 50 INJECTION INTRAVENOUS at 01:11

## 2023-06-29 RX ADMIN — Medication 4: at 07:56

## 2023-06-29 RX ADMIN — Medication 8: at 12:43

## 2023-06-29 RX ADMIN — Medication 0.4 MILLIGRAM(S): at 00:11

## 2023-06-29 RX ADMIN — Medication 2: at 21:51

## 2023-06-29 RX ADMIN — Medication 25 MILLIGRAM(S): at 18:30

## 2023-06-29 RX ADMIN — GABAPENTIN 300 MILLIGRAM(S): 400 CAPSULE ORAL at 05:40

## 2023-06-29 RX ADMIN — FENTANYL CITRATE 25 MICROGRAM(S): 50 INJECTION INTRAVENOUS at 02:11

## 2023-06-29 RX ADMIN — Medication 5 UNIT(S): at 07:56

## 2023-06-29 RX ADMIN — LOSARTAN POTASSIUM 25 MILLIGRAM(S): 100 TABLET, FILM COATED ORAL at 05:40

## 2023-06-29 RX ADMIN — ATORVASTATIN CALCIUM 80 MILLIGRAM(S): 80 TABLET, FILM COATED ORAL at 21:51

## 2023-06-29 RX ADMIN — INSULIN GLARGINE 25 UNIT(S): 100 INJECTION, SOLUTION SUBCUTANEOUS at 22:00

## 2023-06-29 RX ADMIN — GABAPENTIN 300 MILLIGRAM(S): 400 CAPSULE ORAL at 21:51

## 2023-06-29 RX ADMIN — GABAPENTIN 300 MILLIGRAM(S): 400 CAPSULE ORAL at 13:00

## 2023-06-29 RX ADMIN — Medication 5 UNIT(S): at 17:15

## 2023-06-29 RX ADMIN — Medication 25 MILLIGRAM(S): at 05:41

## 2023-06-29 RX ADMIN — Medication 0.4 MILLIGRAM(S): at 00:30

## 2023-06-29 NOTE — PROVIDER CONTACT NOTE (OTHER) - ASSESSMENT
Pt c/o sharp chest pain 8/10, radiating to back. See flowsheet for VS. Pt denies SOB, headache, palpitations, dizziness.

## 2023-06-29 NOTE — PROGRESS NOTE ADULT - PROBLEM SELECTOR PLAN 2
Chronic  - BP elevated on presentation to the ED, now improved  - On Losartan 25mg daily, continue inpatient with hold parameters  - Monitor routine hemodynamics
Chronic  - BP elevated on presentation to the ED, now improved  - On Losartan 25mg daily, continue inpatient with hold parameters  -Continue metoprolol  - Monitor routine hemodynamics

## 2023-06-29 NOTE — PROGRESS NOTE ADULT - PROBLEM SELECTOR PLAN 4
Chronic, on pravastatin 80mg at home  - Continue atorvastatin 40mg inpatient
Chronic, on pravastatin 80mg at home  - Continue atorvastatin 80mg inpatient

## 2023-06-29 NOTE — PROGRESS NOTE ADULT - PROBLEM SELECTOR PLAN 3
Chronic  - Patient takes Lantus 30u qhs, Admelog 10u TID premeal, Januvia 50mg. Stopped taking metformin 2/2 GI side effects  - Hold oral hypoglycemics inpatient  - Accuchecks and hypoglycemia protocol  - F/u AM A1c- 11.7, endo consulted, DM educator consulted. Patient educated extensively on harmful effects of uncontrolled DM and need for strict glycemic control
Chronic  - Patient takes Lantus 30u qhs, Admelog 10u TID premeal, Januvia 50mg. Stopped taking metformin 2/2 GI side effects  - Hold oral hypoglycemics inpatient  - Accuchecks and hypoglycemia protocol  - F/u AM A1c- 11.7, endo consulted, DM educator consulted

## 2023-06-29 NOTE — CHART NOTE - NSCHARTNOTESELECT_GEN_ALL_CORE
Chest Pressure/Off Service Note
DAPT Prescriptions/Event Note
Interventional Cardiology/Event Note

## 2023-06-29 NOTE — PROGRESS NOTE ADULT - SUBJECTIVE AND OBJECTIVE BOX
Patient is a 52y old  Male who presents with a chief complaint of NSTEMI (29 Jun 2023 07:48)    spoke with patient via  [id number], daughter Elvira @ bedside  Type 2 DM DX 1999, DFU, new NSTEMI s/p 4 stents, has been taking insulin since 2005, started with new endocrinologist Dr. Navneet beckett appt in April, has f/u in 3 weeks. Rx home basaglar 30 units @ HS, admelog 9-12 units premeal, januvia 50mg daily, started metformin 1000mg BID, but self-stopped r/t GI sideeffects. used pens and vial/syringe in past, reviewed insulin injection sites and need for consistent frequency, recommend patient start w/ 500mg metformin BID and increase to 1000mg BID after 1 week as tolerated.  pt reports does F/S ACHS , readings always >200, occasional episodes of hypoglycemia ~60mg/dL when eating soup. explained types of insulin onset and duration, need to have carbs when taking insulin. s/s of hypoglycemia, treatment w/ 15g fast acting carb i.e apple juice. pt used freestyle christiano in past, will reorder, place prior to discharge. reviewed using camille. reviewed CC diet and carb identification/portion control, balanced plate method.  reviewed relation to glycemic control and complications of NSTEMI, BG targets and A1c measure. discussed importance of 150 min/week moderate intensity exercise.  diabetes education provided- A1c measure and BG targets  fasting, <180 2 hours postmeal. medication MOA and considerations/side effects, inhospital BGM frequency and insulin administration, s/s of hyperglycemia/hypoglycemia and management, glycemic control and preventing complications, consistent carb diet, balanced plate method, consistent meal planning. sick day management, provider f/u  Hx HLD, NSTEMI s/p stents    HPI:  51yo male with PMH of T2DM on insulin, HTN, HLD who presents to the ED with posterior neck pain radiating to the left chest.  used, ID# [id number]. Pt states around noon yesterday he developed posterior neck pain radiating up to the back of his head. Around 3pm he began to have left sided chest pain while sedentary, intermittent, non-radiating, resolving within a few seconds. Last night around 9pm he developed worsening left sided chest pain with radiation to his neck and left arm, along with diaphoresis and SOB, described it as a choking sensation in his chest/throat. Pt notes he has been having dyspnea on exertion for the past 3mos, went to see his cardiologist and had a stress test done 2 mos ago that he was unable to complete due to dyspnea, was told the test was abnormal but unsure of what the follow up recommendations were. He reports no history of smoking. During time of evaluation patient still reporting mild left sided chest pain, though improved.    In the ED:  Vitals: Tmax 98.1F | HR 86 | /81> 131/79 | RR 18 | SpO2 98% on RA  Labs: trop 14.3 > 310.4, glucose 368, Na 133  CXR: on wet read, grossly clear lungs  ECG: NSR, rate 83, LAD  Received ASA 324mg, heparin gtt, 6u Humulin (27 Jun 2023 08:36)      PAST MEDICAL & SURGICAL HISTORY:  Diabetes mellitus      Hypercholesteremia      No significant past surgical history    Allergies    No Known Allergies    Intolerances        MEDICATIONS  (STANDING):  aspirin enteric coated 81 milliGRAM(s) Oral daily  atorvastatin 80 milliGRAM(s) Oral at bedtime  dextrose 5%. 1000 milliLiter(s) (100 mL/Hr) IV Continuous <Continuous>  dextrose 5%. 1000 milliLiter(s) (50 mL/Hr) IV Continuous <Continuous>  dextrose 50% Injectable 25 Gram(s) IV Push once  dextrose 50% Injectable 12.5 Gram(s) IV Push once  dextrose 50% Injectable 25 Gram(s) IV Push once  gabapentin 300 milliGRAM(s) Oral three times a day  glucagon  Injectable 1 milliGRAM(s) IntraMuscular once  insulin glargine Injectable (LANTUS) 20 Unit(s) SubCutaneous at bedtime  insulin lispro (ADMELOG) corrective regimen sliding scale   SubCutaneous three times a day before meals  insulin lispro (ADMELOG) corrective regimen sliding scale   SubCutaneous at bedtime  insulin lispro Injectable (ADMELOG) 5 Unit(s) SubCutaneous three times a day before meals  losartan 25 milliGRAM(s) Oral daily  metoprolol tartrate 25 milliGRAM(s) Oral every 12 hours  sodium chloride 0.9%. 500 milliLiter(s) (100 mL/Hr) IV Continuous <Continuous>  ticagrelor 90 milliGRAM(s) Oral every 12 hours

## 2023-06-29 NOTE — SOCIAL WORK PROGRESS NOTE - NSSWPROGRESSNOTE_GEN_ALL_CORE
received call from nurse Nicole [phone] regarding patient's concern about paying for medications. I met with patient and  services Gregory #[id number]. Patient reported that he was not able to get 2 of  his medications for his Diabetes. He said when he went they told  him it was too soon to refill. He provided pharmacy [facility] in [place] [phone].  explained that if the pharmacy said he could not pickup these medications yet it could be too soon. I asked him when he last saw the doctor and he said 2 months ago. He provided me with his daughter's number Elvira [phone] and another daughter Beth [phone] whom we left message for. He said that one of his daughters will pickup patient when dc home. The consult: "Pt has a HGB A1C of 11 STates he has been having issues obtaining diabetic medications and now has cardiac medications post NSTEMI PCI x 4" marked this as complete. I spoke with his pharmacy Dot who said the [facility] needs prior approval and she faxed over the request to his doctors office Dr. mcfadden [phone]. The other medication Admelog is available for refill. I updated nurse. Plan for home when stable

## 2023-06-29 NOTE — PROGRESS NOTE ADULT - SUBJECTIVE AND OBJECTIVE BOX
INTERVAL HPI/OVERNIGHT EVENTS: Patient seen and examined at bedside. Overnight, patient noted to have sharp chest pain 8/10 on pain scale, resolved after receiving nitroglycerin x3 and fentanyl 25 mcg IV x1. Patient denies any further episodes of chest pain. States he is feeling well, denies any dizziness, lightheadedness, abd pain, pain at RLE or RUE access site.    ROS: All other review of systems is negative unless indicated above.    MEDICATIONS  (STANDING):  aspirin enteric coated 81 milliGRAM(s) Oral daily  atorvastatin 80 milliGRAM(s) Oral at bedtime  dextrose 5%. 1000 milliLiter(s) (100 mL/Hr) IV Continuous <Continuous>  dextrose 5%. 1000 milliLiter(s) (50 mL/Hr) IV Continuous <Continuous>  dextrose 50% Injectable 25 Gram(s) IV Push once  dextrose 50% Injectable 12.5 Gram(s) IV Push once  dextrose 50% Injectable 25 Gram(s) IV Push once  gabapentin 300 milliGRAM(s) Oral three times a day  glucagon  Injectable 1 milliGRAM(s) IntraMuscular once  insulin glargine Injectable (LANTUS) 20 Unit(s) SubCutaneous at bedtime  insulin lispro (ADMELOG) corrective regimen sliding scale   SubCutaneous three times a day before meals  insulin lispro (ADMELOG) corrective regimen sliding scale   SubCutaneous at bedtime  insulin lispro Injectable (ADMELOG) 5 Unit(s) SubCutaneous three times a day before meals  losartan 25 milliGRAM(s) Oral daily  metoprolol tartrate 25 milliGRAM(s) Oral every 12 hours  sodium chloride 0.9%. 500 milliLiter(s) (100 mL/Hr) IV Continuous <Continuous>  ticagrelor 90 milliGRAM(s) Oral every 12 hours    MEDICATIONS  (PRN):  acetaminophen     Tablet .. 650 milliGRAM(s) Oral every 6 hours PRN Temp greater or equal to 38C (100.4F), Mild Pain (1 - 3)  dextrose Oral Gel 15 Gram(s) Oral once PRN Blood Glucose LESS THAN 70 milliGRAM(s)/deciliter  fentaNYL    Injectable 25 MICROGram(s) IV Push every 4 hours PRN Severe Pain (7 - 10)  nitroglycerin     SubLingual 0.4 milliGRAM(s) SubLingual every 5 minutes PRN Chest Pain      Allergies    No Known Allergies    Intolerances            Vital Signs Last 24 Hrs  T(C): 36.8 (29 Jun 2023 12:00), Max: 36.9 (29 Jun 2023 07:00)  T(F): 98.2 (29 Jun 2023 12:00), Max: 98.5 (29 Jun 2023 07:00)  HR: 82 (29 Jun 2023 18:30) (63 - 83)  BP: 105/59 (29 Jun 2023 18:30) (86/52 - 136/69)  BP(mean): 77 (29 Jun 2023 18:30) (63 - 98)  RR: 22 (29 Jun 2023 18:30) (16 - 24)  SpO2: 97% (29 Jun 2023 18:30) (96% - 99%)    Parameters below as of 29 Jun 2023 18:30  Patient On (Oxygen Delivery Method): room air        06-28 @ 07:01  -  06-29 @ 07:00  --------------------------------------------------------  IN: 1268.5 mL / OUT: 800 mL / NET: 468.5 mL    06-29 @ 07:01  -  06-29 @ 20:57  --------------------------------------------------------  IN: 600 mL / OUT: 700 mL / NET: -100 mL        Physical Exam:  General: laying in bed comfortably, NAD  Neurology: A&Ox3, nonfocal, KAUR x 4  Respiratory: CTA B/L  CV: RRR, S1S2, no murmurs, rubs or gallops  Abdominal: Soft, NT, ND +BS  Extremities: No edema, + peripheral pulses      LABS:                        14.8   13.47 )-----------( 176      ( 29 Jun 2023 05:16 )             42.9     06-29    134<L>  |  105  |  15  ----------------------------<  248<H>  4.3   |  24  |  0.83    Ca    8.8      29 Jun 2023 05:16    TPro  6.7  /  Alb  3.2<L>  /  TBili  0.5  /  DBili  x   /  AST  29  /  ALT  25  /  AlkPhos  70  06-28    PTT - ( 28 Jun 2023 06:07 )  PTT:45.8 sec  Urinalysis Basic - ( 29 Jun 2023 05:16 )    Color: x / Appearance: x / SG: x / pH: x  Gluc: 248 mg/dL / Ketone: x  / Bili: x / Urobili: x   Blood: x / Protein: x / Nitrite: x   Leuk Esterase: x / RBC: x / WBC x   Sq Epi: x / Non Sq Epi: x / Bacteria: x        RADIOLOGY & ADDITIONAL TESTS:

## 2023-06-29 NOTE — PROGRESS NOTE ADULT - SUBJECTIVE AND OBJECTIVE BOX
[facility] -- Eden Ferris Pannella, Patel, Savella, Goodger  Office # [phone]    Follow Up:  NSTEMI    Subjective/Observations: Troponin still uptrending    REVIEW OF SYSTEMS: All other review of systems is negative unless indicated above  PAST MEDICAL & SURGICAL HISTORY:  Diabetes mellitus  Hypercholesteremia  No significant past surgical history    MEDICATIONS  (STANDING):  aspirin enteric coated 81 milliGRAM(s) Oral daily  atorvastatin 80 milliGRAM(s) Oral at bedtime  dextrose 5%. 1000 milliLiter(s) (100 mL/Hr) IV Continuous <Continuous>  dextrose 5%. 1000 milliLiter(s) (50 mL/Hr) IV Continuous <Continuous>  dextrose 50% Injectable 25 Gram(s) IV Push once  dextrose 50% Injectable 12.5 Gram(s) IV Push once  dextrose 50% Injectable 25 Gram(s) IV Push once  gabapentin 300 milliGRAM(s) Oral three times a day  glucagon  Injectable 1 milliGRAM(s) IntraMuscular once  insulin glargine Injectable (LANTUS) 15 Unit(s) SubCutaneous at bedtime  insulin lispro (ADMELOG) corrective regimen sliding scale   SubCutaneous three times a day before meals  insulin lispro (ADMELOG) corrective regimen sliding scale   SubCutaneous at bedtime  insulin lispro Injectable (ADMELOG) 5 Unit(s) SubCutaneous three times a day before meals  losartan 25 milliGRAM(s) Oral daily  metoprolol tartrate 25 milliGRAM(s) Oral every 12 hours  sodium chloride 0.9%. 500 milliLiter(s) (100 mL/Hr) IV Continuous <Continuous>  ticagrelor 90 milliGRAM(s) Oral every 12 hours    MEDICATIONS  (PRN):  acetaminophen     Tablet .. 650 milliGRAM(s) Oral every 6 hours PRN Temp greater or equal to 38C (100.4F), Mild Pain (1 - 3)  dextrose Oral Gel 15 Gram(s) Oral once PRN Blood Glucose LESS THAN 70 milliGRAM(s)/deciliter  fentaNYL    Injectable 25 MICROGram(s) IV Push every 4 hours PRN Severe Pain (7 - 10)  nitroglycerin     SubLingual 0.4 milliGRAM(s) SubLingual every 5 minutes PRN Chest Pain    Allergies    No Known Allergies    Intolerances    Vital Signs Last 24 Hrs  T(C): 36.9 (28 Jun 2023 20:15), Max: 36.9 (28 Jun 2023 20:15)  T(F): 98.5 (28 Jun 2023 20:15), Max: 98.5 (28 Jun 2023 20:15)  HR: 70 (29 Jun 2023 06:00) (63 - 83)  BP: 126/58 (29 Jun 2023 06:00) (98/54 - 156/84)  BP(mean): 84 (29 Jun 2023 06:00) (65 - 102)  RR: 16 (29 Jun 2023 06:00) (13 - 19)  SpO2: 98% (29 Jun 2023 06:00) (95% - 99%)    Parameters below as of 28 Jun 2023 21:00  Patient On (Oxygen Delivery Method): room air    I&O's Summary    28 Jun 2023 07:01  -  29 Jun 2023 07:00  --------------------------------------------------------  IN: 1268.5 mL / OUT: 800 mL / NET: 468.5 mL      Weight (kg): 66.9 (06-28 @ 12:03)      LABS: All Labs Reviewed:                        14.8   13.47 )-----------( 176      ( 29 Jun 2023 05:16 )             42.9                         14.6   11.18 )-----------( 189      ( 28 Jun 2023 23:46 )             42.7                         14.5   9.33  )-----------( 180      ( 28 Jun 2023 06:07 )             42.2     29 Jun 2023 05:16    134    |  105    |  15     ----------------------------<  248    4.3     |  24     |  0.83   28 Jun 2023 23:46    136    |  105    |  14     ----------------------------<  205    4.1     |  25     |  0.76   28 Jun 2023 06:07    136    |  106    |  16     ----------------------------<  233    4.1     |  26     |  0.80     Ca    8.8        29 Jun 2023 05:16  Ca    8.5        28 Jun 2023 23:46  Ca    8.6        28 Jun 2023 06:07    TPro  6.7    /  Alb  3.2    /  TBili  0.5    /  DBili  x      /  AST  29     /  ALT  25     /  AlkPhos  70     28 Jun 2023 06:07  TPro  6.7    /  Alb  3.3    /  TBili  0.3    /  DBili  x      /  AST  16     /  ALT  25     /  AlkPhos  88     27 Jun 2023 02:34    PTT - ( 28 Jun 2023 06:07 )  PTT:45.8 sec  CARDIAC MARKERS ( 28 Jun 2023 23:46 )  x     / x     / 700 U/L / x     / 49.5 ng/mL  CARDIAC MARKERS ( 28 Jun 2023 06:07 )  x     / x     / 203 U/L / x     / 13.4 ng/mL  CARDIAC MARKERS ( 27 Jun 2023 16:57 )  x     / x     / 138 U/L / x     / 3.8 ng/mL      Ventricular Rate 61 BPM    Atrial Rate 61 BPM    P-R Interval 168 ms    QRS Duration 102 ms    Q-T Interval 410 ms    QTC Calculation(Bazett) 412 ms    P Axis 39 degrees    R Axis -45 degrees    T Axis -15 degrees    Diagnosis Line Normal sinus rhythm  Left axis deviation  Possible Anterior infarct , age undetermined  Abnormal ECG  When compared with ECG of 27-JUN-2023 10:27, (Unconfirmed)  No significant change was found  Confirmed by Ayo Harmon MD ([id number]) on 6/27/2023 3:14:37 PM      TRANSTHORACIC ECHOCARDIOGRAM REPORT  ________________________________________________________________________________                                      _______    Portions of this table do not appear on this page.     Pt. Name:       DANIEL STANFORD Study Date:    6/28/2023  MRN:            [id number]      YOB: 1970  Accession #:    [id number]     Age:           52 years  Account#:       [id number]    Gender:        M  Heart Rate:                   Height:        64.17 in (163.00 cm)  Rhythm:                       Weight:        147.71 lb (67.00 kg)  Blood Pressure: 141/76 mmHg   BSA/BMI:       1.72 m² / 25.22 kg/m²  ________________________________________________________________________________________  Referring Physician:  [id number] Alecia Reed  Interpreting Physician: Faustino Heck  Primary Sonographer:    AS    CPT:               ECHO TTE WO CON COMP W DOPP - 50080.m  Indication(s):     Chest pain, unspecified - R07.9  Procedure:         Transthoracic echocardiogram with 2-D, M-mode and complete                     spectral and color flow Doppler.  Ordering Location: [facility]  Study Information: Image quality for this study is technically difficult.    _______________________________________________________________________________________  CONCLUSIONS:      1. Technically difficult image quality.   2. The left ventricular systolic function is mildly decreased with an ejection fraction of 53 % by Arenas's method of disks.   3. There is subtle/mild hypokinesis of the basal to mid anterolateral wall.   4. There is mild (grade 1) left ventricular diastolic dysfunction.   5. Normal right ventricular cavity size and probably normal right ventricular systolic function.   6. The left atrium is normal.   7. Trace mitral regurgitation.   8. The aortic valve is tricuspid with normal structure without stenosis with normal systolic excursion.   9. No evidence of aortic regurgitation.  10. Trace tricuspid regurgitation.  _______________________________________________________________________________________  FINDINGS:     Left Ventricle:  The left ventricular systolic function is mildly decreased with a calculated ejection fraction of 53 % by the Arenas's biplane method of disks. There is mild (grade 1) left ventricular diastolic dysfunction. There is subtle/mild hypokinesis of the basal to mid anterolateral wall.     Right Ventricle:  Normal right ventricular cavity size and probably normal right ventricular systolic function. Tricuspid annular plane systolic excursion (TAPSE) is 2.0 cm (normal >=1.7 cm).     Left Atrium:  The left atrium is normal with an indexed volume of 23.33 ml/m².     Right Atrium:  The right atrium is normal.     Aortic Valve:  The aortic valve is tricuspid with normalstructure without stenosis with normal systolic excursion. There is mild thickening of the aortic valve leaflets. There is no evidence of aortic regurgitation.     Mitral Valve:  Structurally normal mitral valve with normal leaflet excursion. There is mitral valve thickening of the anterior and posterior leaflets. There is trace mitral regurgitation.     Tricuspid Valve:  There is trace tricuspid regurgitation.     Pulmonic Valve:  There is mild pulmonic regurgitation.  ____________________________________________________________________  Quantitative Data:  Left Ventricle Measurements: (Indexed to BSA)     IVSd (2D):   1.1 cm  LVPWd (2D):  1.1 cm  LVIDd (2D):  4.2 cm  LVIDs (2D):  3.0 cm  LV Mass:     152 g  88.5 g/m²  LV Vol d, MOD A2C: 59.5 ml 34.53 ml/m²  LV Vol d, MOD A4C: 78.2 ml 45.39 ml/m²  LV Vol d, MOD BP:  68.0 ml 39.47 ml/m²  LV Vol s, MOD A2C: 24.8 ml 14.39 ml/m²  LV Vol s, MOD A4C: 39.1 ml 22.69 ml/m²  LV Vol s, MOD BP:  31.9 ml 18.50 ml/m²  LVOT SV MOD BP:    36.1 ml  LV EF% MOD BP:     53 %     MV E Vmax:   0.73 m/s  MV A Vmax:   0.78 m/s  MV E/A:      0.93  e' medial:   6.96 cm/s  E/e' medial: 10.46  MV DT:       181 msec  Aorta Measurements:     Ao Root s, 2D: 3.0 cm  Ao Arch:       2.3 cm  Left Atrium Measurements: (Indexed to BSA)  LA Diam 2D: 2.80 cm    Right Ventricle Measurements:     TAPSE:            2.0 cm  TV Harmony. S':       9.90 cm/s  RV Base (RVID1):  2.6 cm  RV Mid (RVID2):   2.2 cm  RV Major (RVID3): 6.3 cm  LVOT / RVOT/ Qp/Qs Data: (Indexed to BSA)  LVOT Diameter: 2.00 cm    Mitral Valve Measurements:     MV E Vmax: 0.7 m/s  MV A Vmax: 0.8 m/s  MV E/A:    0.9    ________________________________________________________________________________________  Electronically signed on 6/28/2023at 9:12:08 PM by Faustino Heck    *** Final ***     [facility] -- Eden Ferris Pannella, Patel, Savella, Goodger  Office # [phone]    Follow Up:  NSTEMI    Subjective/Observations: Troponin still uptrending.  Had brief CP overnight.  No further complaints this morning.  Remains comfortable on RA.  NSR on tele with no arrhthymias.  Rt groin cath site benign    REVIEW OF SYSTEMS: All other review of systems is negative unless indicated above  PAST MEDICAL & SURGICAL HISTORY:  Diabetes mellitus  Hypercholesteremia  No significant past surgical history    MEDICATIONS  (STANDING):  aspirin enteric coated 81 milliGRAM(s) Oral daily  atorvastatin 80 milliGRAM(s) Oral at bedtime  dextrose 5%. 1000 milliLiter(s) (100 mL/Hr) IV Continuous <Continuous>  dextrose 5%. 1000 milliLiter(s) (50 mL/Hr) IV Continuous <Continuous>  dextrose 50% Injectable 25 Gram(s) IV Push once  dextrose 50% Injectable 12.5 Gram(s) IV Push once  dextrose 50% Injectable 25 Gram(s) IV Push once  gabapentin 300 milliGRAM(s) Oral three times a day  glucagon  Injectable 1 milliGRAM(s) IntraMuscular once  insulin glargine Injectable (LANTUS) 15 Unit(s) SubCutaneous at bedtime  insulin lispro (ADMELOG) corrective regimen sliding scale   SubCutaneous three times a day before meals  insulin lispro (ADMELOG) corrective regimen sliding scale   SubCutaneous at bedtime  insulin lispro Injectable (ADMELOG) 5 Unit(s) SubCutaneous three times a day before meals  losartan 25 milliGRAM(s) Oral daily  metoprolol tartrate 25 milliGRAM(s) Oral every 12 hours  sodium chloride 0.9%. 500 milliLiter(s) (100 mL/Hr) IV Continuous <Continuous>  ticagrelor 90 milliGRAM(s) Oral every 12 hours    MEDICATIONS  (PRN):  acetaminophen     Tablet .. 650 milliGRAM(s) Oral every 6 hours PRN Temp greater or equal to 38C (100.4F), Mild Pain (1 - 3)  dextrose Oral Gel 15 Gram(s) Oral once PRN Blood Glucose LESS THAN 70 milliGRAM(s)/deciliter  fentaNYL    Injectable 25 MICROGram(s) IV Push every 4 hours PRN Severe Pain (7 - 10)  nitroglycerin     SubLingual 0.4 milliGRAM(s) SubLingual every 5 minutes PRN Chest Pain    Allergies    No Known Allergies    Intolerances    Vital Signs Last 24 Hrs  T(C): 36.9 (28 Jun 2023 20:15), Max: 36.9 (28 Jun 2023 20:15)  T(F): 98.5 (28 Jun 2023 20:15), Max: 98.5 (28 Jun 2023 20:15)  HR: 70 (29 Jun 2023 06:00) (63 - 83)  BP: 126/58 (29 Jun 2023 06:00) (98/54 - 156/84)  BP(mean): 84 (29 Jun 2023 06:00) (65 - 102)  RR: 16 (29 Jun 2023 06:00) (13 - 19)  SpO2: 98% (29 Jun 2023 06:00) (95% - 99%)    Parameters below as of 28 Jun 2023 21:00  Patient On (Oxygen Delivery Method): room air    I&O's Summary    28 Jun 2023 07:01  -  29 Jun 2023 07:00  --------------------------------------------------------  IN: 1268.5 mL / OUT: 800 mL / NET: 468.5 mL      Weight (kg): 66.9 (06-28 @ 12:03)  PHYSICAL EXAM:  TELE: NSR  Constitutional: NAD, awake and alert, well-developed  HEENT: Moist Mucous Membranes, Anicteric  Pulmonary: Non-labored, breath sounds are clear bilaterally, No wheezing, rales or rhonchi  Cardiovascular: Regular, S1 and S2, No murmurs, rubs, gallops or clicks  Gastrointestinal: Bowel Sounds present, soft, nontender.   Lymph: No peripheral edema. No lymphadenopathy.  Skin: No visible rashes or ulcers.  Psych:  Mood & affect appropriate    LABS: All Labs Reviewed:                        14.8   13.47 )-----------( 176      ( 29 Jun 2023 05:16 )             42.9                         14.6   11.18 )-----------( 189      ( 28 Jun 2023 23:46 )             42.7                         14.5   9.33  )-----------( 180      ( 28 Jun 2023 06:07 )             42.2     29 Jun 2023 05:16    134    |  105    |  15     ----------------------------<  248    4.3     |  24     |  0.83   28 Jun 2023 23:46    136    |  105    |  14     ----------------------------<  205    4.1     |  25     |  0.76   28 Jun 2023 06:07    136    |  106    |  16     ----------------------------<  233    4.1     |  26     |  0.80     Ca    8.8        29 Jun 2023 05:16  Ca    8.5        28 Jun 2023 23:46  Ca    8.6        28 Jun 2023 06:07    TPro  6.7    /  Alb  3.2    /  TBili  0.5    /  DBili  x      /  AST  29     /  ALT  25     /  AlkPhos  70     28 Jun 2023 06:07  TPro  6.7    /  Alb  3.3    /  TBili  0.3    /  DBili  x      /  AST  16     /  ALT  25     /  AlkPhos  88     27 Jun 2023 02:34    PTT - ( 28 Jun 2023 06:07 )  PTT:45.8 sec  CARDIAC MARKERS ( 28 Jun 2023 23:46 )  x     / x     / 700 U/L / x     / 49.5 ng/mL  CARDIAC MARKERS ( 28 Jun 2023 06:07 )  x     / x     / 203 U/L / x     / 13.4 ng/mL  CARDIAC MARKERS ( 27 Jun 2023 16:57 )  x     / x     / 138 U/L / x     / 3.8 ng/mL      Ventricular Rate 61 BPM    Atrial Rate 61 BPM    P-R Interval 168 ms    QRS Duration 102 ms    Q-T Interval 410 ms    QTC Calculation(Bazett) 412 ms    P Axis 39 degrees    R Axis -45 degrees    T Axis -15 degrees    Diagnosis Line Normal sinus rhythm  Left axis deviation  Possible Anterior infarct , age undetermined  Abnormal ECG  When compared with ECG of 27-JUN-2023 10:27, (Unconfirmed)  No significant change was found  Confirmed by Ayo Harmon MD ([id number]) on 6/27/2023 3:14:37 PM      TRANSTHORACIC ECHOCARDIOGRAM REPORT  ________________________________________________________________________________                                      _______    Portions of this table do not appear on this page.     Pt. Name:       DANIEL STANFORD Study Date:    6/28/2023  MRN:            [id number]      YOB: 1970  Accession #:    [id number]     Age:           52 years  Account#:       [id number]    Gender:        M  Heart Rate:                   Height:        64.17 in (163.00 cm)  Rhythm:                       Weight:        147.71 lb (67.00 kg)  Blood Pressure: 141/76 mmHg   BSA/BMI:       1.72 m² / 25.22 kg/m²  ________________________________________________________________________________________  Referring Physician:  [id number] Alecia Reed  Interpreting Physician: Faustino Heck  Primary Sonographer:    AS    CPT:               ECHO TTE WO CON COMP W DOPP - 22365.m  Indication(s):     Chest pain, unspecified - R07.9  Procedure:         Transthoracic echocardiogram with 2-D, M-mode and complete                     spectral and color flow Doppler.  Ordering Location: [facility]  Study Information: Image quality for this study is technically difficult.    _______________________________________________________________________________________  CONCLUSIONS:      1. Technically difficult image quality.   2. The left ventricular systolic function is mildly decreased with an ejection fraction of 53 % by Arenas's method of disks.   3. There is subtle/mild hypokinesis of the basal to mid anterolateral wall.   4. There is mild (grade 1) left ventricular diastolic dysfunction.   5. Normal right ventricular cavity size and probably normal right ventricular systolic function.   6. The left atrium is normal.   7. Trace mitral regurgitation.   8. The aortic valve is tricuspid with normal structure without stenosis with normal systolic excursion.   9. No evidence of aortic regurgitation.  10. Trace tricuspid regurgitation.  _______________________________________________________________________________________  FINDINGS:     Left Ventricle:  The left ventricular systolic function is mildly decreased with a calculated ejection fraction of 53 % by the Arenas's biplane method of disks. There is mild (grade 1) left ventricular diastolic dysfunction. There is subtle/mild hypokinesis of the basal to mid anterolateral wall.     Right Ventricle:  Normal right ventricular cavity size and probably normal right ventricular systolic function. Tricuspid annular plane systolic excursion (TAPSE) is 2.0 cm (normal >=1.7 cm).     Left Atrium:  The left atrium is normal with an indexed volume of 23.33 ml/m².     Right Atrium:  The right atrium is normal.     Aortic Valve:  The aortic valve is tricuspid with normalstructure without stenosis with normal systolic excursion. There is mild thickening of the aortic valve leaflets. There is no evidence of aortic regurgitation.     Mitral Valve:  Structurally normal mitral valve with normal leaflet excursion. There is mitral valve thickening of the anterior and posterior leaflets. There is trace mitral regurgitation.     Tricuspid Valve:  There is trace tricuspid regurgitation.     Pulmonic Valve:  There is mild pulmonic regurgitation.  ____________________________________________________________________  Quantitative Data:  Left Ventricle Measurements: (Indexed to BSA)     IVSd (2D):   1.1 cm  LVPWd (2D):  1.1 cm  LVIDd (2D):  4.2 cm  LVIDs (2D):  3.0 cm  LV Mass:     152 g  88.5 g/m²  LV Vol d, MOD A2C: 59.5 ml 34.53 ml/m²  LV Vol d, MOD A4C: 78.2 ml 45.39 ml/m²  LV Vol d, MOD BP:  68.0 ml 39.47 ml/m²  LV Vol s, MOD A2C: 24.8 ml 14.39 ml/m²  LV Vol s, MOD A4C: 39.1 ml 22.69 ml/m²  LV Vol s, MOD BP:  31.9 ml 18.50 ml/m²  LVOT SV MOD BP:    36.1 ml  LV EF% MOD BP:     53 %     MV E Vmax:   0.73 m/s  MV A Vmax:   0.78 m/s  MV E/A:      0.93  e' medial:   6.96 cm/s  E/e' medial: 10.46  MV DT:       181 msec  Aorta Measurements:     Ao Root s, 2D: 3.0 cm  Ao Arch:       2.3 cm  Left Atrium Measurements: (Indexed to BSA)  LA Diam 2D: 2.80 cm    Right Ventricle Measurements:     TAPSE:            2.0 cm  TV Harmony. S':       9.90 cm/s  RV Base (RVID1):  2.6 cm  RV Mid (RVID2):   2.2 cm  RV Major (RVID3): 6.3 cm  LVOT / RVOT/ Qp/Qs Data: (Indexed to BSA)  LVOT Diameter: 2.00 cm    Mitral Valve Measurements:     MV E Vmax: 0.7 m/s  MV A Vmax: 0.8 m/s  MV E/A:    0.9    ________________________________________________________________________________________  Electronically signed on 6/28/2023at 9:12:08 PM by Faustino Heck    *** Final ***

## 2023-06-29 NOTE — PROGRESS NOTE ADULT - SUBJECTIVE AND OBJECTIVE BOX
Department of Cardiology                                                                     Division of Interventional Cardiology                                                                  [facility]/ [address] [address]                                                                             Telephone: [phone]                                                    Post- Procedure Note: Left Heart Cardiac Catheterization /  Staged PCI-mRCA & RPL via RRA (), PCI x2 to LAD & LCX via RFA ( now with Angioseal closure agent) by Dr. Luiza Bunn      52y  Male is now s/p left heart catheterization/ Staged PCI-mRCA & RPL via RRA (), PCI x2 to LAD & LCX via RFA () ( now with Angioseal closure agent) by Dr. Luiza uBnn    23-Pt seen and examined this am-has not had any further CP since last night at midnight (received NTG 0.4 mg SL x3, followed by Fentanyl 25 mcg IVP with relief. No further CP occurrences EKG this am without any acute changes-await repeat cardiac enzymes drawn this am for down trending.  Pt education and information regarding follow-up plan and medication compliance provided via [place] citizen interpretor-pt verbalizes understanding of plan. States he has not complied with diabetes meds secondary to finances-will get  to see pt.    Subjective/ROS:  Denies CP, palpitations, SOB, N/V, fever/chills, dizziness, weakness, numbness/tingling.      HPI:  51yo male with PMH of T2DM on insulin, HTN, HLD who presents to the ED with posterior neck pain radiating to the left chest.  used, ID# [id number]. Pt states around noon yesterday he developed posterior neck pain radiating up to the back of his head. Around 3pm he began to have left sided chest pain while sedentary, intermittent, non-radiating, resolving within a few seconds. Last night around 9pm he developed worsening left sided chest pain with radiation to his neck and left arm, along with diaphoresis and SOB, described it as a choking sensation in his chest/throat. Pt notes he has been having dyspnea on exertion for the past 3mos, went to see his cardiologist and had a stress test done 2 mos ago that he was unable to complete due to dyspnea, was told the test was abnormal but unsure of what the follow up recommendations were. He reports no history of smoking. During time of evaluation patient still reporting mild left sided chest pain, though improved.    In the ED:  Vitals: Tmax 98.1F | HR 86 | /81> 131/79 | RR 18 | SpO2 98% on RA  Labs: trop 14.3 > 310.4, glucose 368, Na 133  CXR: on wet read, grossly clear lungs  ECG: NSR, rate 83, LAD  Received ASA 324mg, heparin gtt, 6u Humulin (2023 08:36)  < from: Cardiac Catheterization (23 @ 13:50) >  Cath Lab Report    Interventional Cardiologist:   Luiza Bunn MD   Referring Physician:           Ayo Harmon MD     Procedures Performed   Procedures:              1.    Ultrasound Guided Access     2.    Arterial Access - Right Femoral   3.    PCI: MARCK   4.    IVUS   5.    PCI: MARCK   6.    AngioSeal     Indications:               Myocardial infarction without ST elevation  (NSTEMI)    Conclusions:   Successful MARCK placementto the pLCx and the pLAD and mLAD. IVUS was  used for both coronary arteries during the    intervention to optimize the stent size.    Recommendations:     DAPT for 1 year. Aggressive lipid and DM rx. F/u with Dr. Harmon.   Acute complication:    No complications     Presentation:   Chief Compaint: NSTEMI - returning for staged PCI to the LAD and LCx     Procedure Narrative:   The risks and alternatives of the procedures and conscious sedation  were explained to the patient and informed consent was  obtained. The patient was brought to the cath lab and placed on the  exam table.  Access   Right femoral artery:   Vascular access was obtained and a 6FR SHEATH PINNACLE  was advanced  into the vessel.    Diagnostic Findings:     Coronary Angiography   LAD   Proximal left anterior descending: There is an 80 % stenosis. Mid left  anterior descending:There is a 70 % stenosis.    CX   Proximal circumflex: There is an 80 % stenosis.       ECHO TTE WO CON COMP W DOPP - 64169.m  Indication(s):     Chest pain, unspecified - R07.9  Procedure:         Transthoracic echocardiogram with 2-D, M-mode and complete                     spectral and color flow Doppler.  Ordering Location: [facility]  Study Information: Image quality for this study is technically difficult.    _______________________________________________________________________________________  CONCLUSIONS:      1. Technically difficult image quality.   2. The left ventricular systolic function is mildly decreased with an ejection fraction of 53 % by Arenas's method of disks.   3. There is subtle/mild hypokinesis of the basal to mid anterolateral wall.   4. There is mild (grade 1) left ventricular diastolic dysfunction.   5. Normal right ventricular cavity size and probably normal right ventricular systolic function.   6. The left atrium is normal.   7. Trace mitral regurgitation.   8. The aortic valve is tricuspid with normal structure without stenosis with normal systolic excursion.   9. No evidence of aortic regurgitation.  10. Trace tricuspid regurgitation.    ________________________________________________________________________________________  FINDINGS:     Left Ventricle:  The left ventricular systolic function is mildly decreased with a calculated ejection fraction of 53 % by the Arenas's biplane method of disks. There is mild (grade 1) left ventricular diastolic dysfunction. There is subtle/mild hypokinesis of the basal to mid anterolateral wall.     Right Ventricle:  Normal right ventricular cavity size and probably normal right ventricular systolic function. Tricuspid annular plane systolic excursion (TAPSE) is 2.0 cm (normal >=1.7 cm).     Left Atrium:  The left atrium is normal with an indexed volume of 23.33 ml/m².     Right Atrium:  The right atrium is normal.     Aortic Valve:  The aortic valve is tricuspid with normalstructure without stenosis with normal systolic excursion. There is mild thickening of the aortic valve leaflets. There is no evidence of aortic regurgitation.     Mitral Valve:  Structurally normal mitral valve with normal leaflet excursion. There is mitral valve thickening of the anterior and posterior leaflets. There is trace mitral regurgitation.     Tricuspid Valve:  There is trace tricuspid regurgitation.     Pulmonic Valve:  There is mild pulmonic regurgitation.  ____________________________________________________________________  Quantitative Data:  Left Ventricle Measurements: (Indexed to BSA)     IVSd (2D):   1.1 cm  LVPWd (2D):  1.1 cm  LVIDd (2D):  4.2 cm  LVIDs (2D):  3.0 cm  LV Mass:     152 g  88.5 g/m²  LV Vol d, MOD A2C: 59.5 ml 34.53 ml/m²  LV Vol d, MOD A4C: 78.2 ml 45.39 ml/m²  LV Vol d, MOD BP:  68.0 ml 39.47 ml/m²  LV Vol s, MOD A2C: 24.8 ml 14.39 ml/m²  LV Vol s, MOD A4C: 39.1 ml 22.69 ml/m²  LV Vol s, MOD BP:  31.9 ml 18.50 ml/m²  LVOT SV MOD BP:    36.1 ml  LV EF% MOD BP:     53 %     MV E Vmax:   0.73 m/s  MV A Vmax:   0.78 m/s  MV E/A:      0.93  e' medial:   6.96 cm/s  E/e' medial: 10.46  MV DT:       181 msec    Aorta Measurements:     Ao Root s, 2D: 3.0 cm  Ao Arch:       2.3 cm       Left Atrium Measurements: (Indexed to BSA)  LA Diam 2D: 2.80 cm    Right Ventricle Measurements:     TAPSE:            2.0 cm  TV Harmony. S':       9.90 cm/s  RV Base (RVID1):  2.6 cm  RV Mid (RVID2):   2.2 cm  RV Major (RVID3): 6.3 cm       LVOT / RVOT/ Qp/Qs Data: (Indexed to BSA)  LVOT Diameter: 2.00 cm    Mitral Valve Measurements:     MV E Vmax: 0.7 m/s  MV A Vmax: 0.8 m/s  MV E/A:    0.9    ________________________________________________________________________________________  Electronically signed on  9:12:08 PM by Faustino Heck               PAST MEDICAL & SURGICAL HISTORY:  Diabetes mellitus      Hypercholesteremia      No significant past surgical history          MEDICATIONS  (STANDING):  aspirin enteric coated 81 milliGRAM(s) Oral daily  atorvastatin 80 milliGRAM(s) Oral at bedtime  dextrose 5%. 1000 milliLiter(s) (100 mL/Hr) IV Continuous <Continuous>  dextrose 5%. 1000 milliLiter(s) (50 mL/Hr) IV Continuous <Continuous>  dextrose 50% Injectable 25 Gram(s) IV Push once  dextrose 50% Injectable 12.5 Gram(s) IV Push once  dextrose 50% Injectable 25 Gram(s) IV Push once  gabapentin 300 milliGRAM(s) Oral three times a day  glucagon  Injectable 1 milliGRAM(s) IntraMuscular once  insulin glargine Injectable (LANTUS) 15 Unit(s) SubCutaneous at bedtime  insulin lispro (ADMELOG) corrective regimen sliding scale   SubCutaneous three times a day before meals  insulin lispro (ADMELOG) corrective regimen sliding scale   SubCutaneous at bedtime  insulin lispro Injectable (ADMELOG) 5 Unit(s) SubCutaneous three times a day before meals  losartan 25 milliGRAM(s) Oral daily  metoprolol tartrate 25 milliGRAM(s) Oral every 12 hours  sodium chloride 0.9%. 500 milliLiter(s) (100 mL/Hr) IV Continuous <Continuous>  ticagrelor 90 milliGRAM(s) Oral every 12 hours    MEDICATIONS  (PRN):  acetaminophen     Tablet .. 650 milliGRAM(s) Oral every 6 hours PRN Temp greater or equal to 38C (100.4F), Mild Pain (1 - 3)  dextrose Oral Gel 15 Gram(s) Oral once PRN Blood Glucose LESS THAN 70 milliGRAM(s)/deciliter  fentaNYL    Injectable 25 MICROGram(s) IV Push every 4 hours PRN Severe Pain (7 - 10)  nitroglycerin     SubLingual 0.4 milliGRAM(s) SubLingual every 5 minutes PRN Chest Pain    Allergies: No Known Allergies                            14.8   13.47 )-----------( 176      ( 2023 05:16 )             42.9     06-    134<L>  |  105  |  15  ----------------------------<  248<H>  4.3   |  24  |  0.83    Ca    8.8      2023 05:16    TPro  6.7  /  Alb  3.2<L>  /  TBili  0.5  /  DBili  x   /  AST  29  /  ALT  25  /  AlkPhos  70      PTT - ( 2023 06:07 )  PTT:45.8 sec      Vital Signs Last 24 Hrs  T(C): 36.9 (2023 20:15), Max: 36.9 (2023 20:15)  T(F): 98.5 (2023 20:15), Max: 98.5 (2023 20:15)  HR: 70 (2023 06:00) (63 - 83)  BP: 126/58 (2023 06:00) (98/54 - 156/84)  BP(mean): 84 (2023 06:00) (65 - 102)  RR: 16 (2023 06:00) (13 - 19)  SpO2: 98% (2023 06:00) (95% - 99%)    Parameters below as of 2023 21:00  Patient On (Oxygen Delivery Method): room air           EC23-NSR HR 70, RAD, no acute changes    Constitutional: NAD, lying comfortably in bed  Neuro: A+O x 3, non-focal, speech clear and intact  HEENT: NC/AT, PERRL, EOMI, anicteric sclerae, oral mucosa pink and moist  Neck: supple, no JVD  CV: regular rate, regular rhythm, +S1S2, no murmurs or rub  Pulm/chest: lung sounds CTA and equal bilaterally, no accessory muscle use noted  Abd: soft, NT, ND, +BS  Ext: KAUR x 4, no C/C/E  Access site: R wrist C/D/I/soft without hematoma-tegadern drsg removed, distal motor/neuro/circ intact. R radial pulse 2+. RFA access site ( now with angioseal) without any drainage, bleeding or hematoma formation + DPs and PTs   Skin: warm, well perfused  Psych: calm, appropriate affect    Patient education related to:  Restricted use with no heavy lifting of affected arm for 48 hours.  No submerging the arm in water for 48 hours.  You may start showering today.  Call your doctor for any bleeding, swelling, loss of sensation in the hand or fingers, or fingers turning blue.  If heavy bleeding or large lumps form, hold pressure at the spot and come to the Emergency Room.  - Bruising at the groin, sometimes extending down the leg, and/or a small lump under the skin at the groin access site is normal and will resolve within 2 – 3 weeks.   - Occasional skipped beats or palpitations that last for a few beats are common and generally resolve within 1-2 months.   - You may walk and take stairs at a regular pace.   - Do not perform any exercise more strenuous than walking for 1 week.   - Do not strain or lift heavy objects for 1 week.  - You may shower the day after the procedure.  - Do not soak in water (such as tub baths, hot tubs, swimming, etc.) for 1 week.   - You may resume all other activities the day after the procedure.  Call your doctor if:   - you notice bleeding, redness, drainage, swelling, increased tenderness or a hot sensation around the catheter insertion site.   - your temperature is greater than 100 degrees F for more than 24 hours.  - your rapid heart rhythm returns.  - you have any questions or concerns regarding the procedure.  If significant bleeding and/or a large lump (the size of a golf ball or bigger) occurs:  - Lie flat and apply continuous direct pressure just above the puncture site for at least 10 minutes  - If the issue resolves, notify your physician immediately.    - If the bleeding cannot be controlled, please seek immediate medical attention.  If you experience increased difficulty breathing or chest pain, or if you faint or have dizzy spells, please seek immediate medical attention.                                                                                  Department of Cardiology                                                                     Division of Interventional Cardiology                                                                  [facility]/ [address] [address]                                                                             Telephone: [phone]                                                    Post- Procedure Note: S/P NSTEMI, s/p Left Heart Cardiac Catheterization /  Staged PCI-mRCA & RPL via RRA (), PCI x2 to LAD & LCX via RFA ( now with Angioseal closure agent) by Dr. Luiza Bunn      52y  Male is s/p NSTEMI, now s/p left heart catheterization/ Staged PCI-mRCA & RPL via RRA (), PCI x2 to LAD & LCX via RFA () ( now with Angioseal closure agent) by Dr. Luiza Bunn    23-Pt seen and examined this am @ bedside-has not had any further CP since last night at midnight (received NTG 0.4 mg SL x3, followed by Fentanyl 25 mcg IVP with relief. No further CP occurrences EKG this am without any acute changes-await repeat cardiac enzymes drawn this am for down trending. D/W Cardiology Team.  Pt education and information regarding follow-up plan and medication compliance provided via [place] interpretor-pt verbalizes understanding of plan. States he has not complied with diabetes meds secondary to finances-will get  to see pt.    Subjective/ROS:  Denies CP, palpitations, SOB, N/V, fever/chills, dizziness, weakness, numbness/tingling.      HPI:  51yo male with PMH of T2DM on insulin, HTN, HLD who presents to the ED with posterior neck pain radiating to the left chest.  used, ID# [id number]. Pt states around noon yesterday he developed posterior neck pain radiating up to the back of his head. Around 3pm he began to have left sided chest pain while sedentary, intermittent, non-radiating, resolving within a few seconds. Last night around 9pm he developed worsening left sided chest pain with radiation to his neck and left arm, along with diaphoresis and SOB, described it as a choking sensation in his chest/throat. Pt notes he has been having dyspnea on exertion for the past 3mos, went to see his cardiologist and had a stress test done 2 mos ago that he was unable to complete due to dyspnea, was told the test was abnormal but unsure of what the follow up recommendations were. He reports no history of smoking. During time of evaluation patient still reporting mild left sided chest pain, though improved.    In the ED:  Vitals: Tmax 98.1F | HR 86 | /81> 131/79 | RR 18 | SpO2 98% on RA  Labs: trop 14.3 > 310.4, glucose 368, Na 133  CXR: on wet read, grossly clear lungs  ECG: NSR, rate 83, LAD  Received ASA 324mg, heparin gtt, 6u Humulin (2023 08:36)  < from: Cardiac Catheterization (23 @ 13:50) >  Cath Lab Report    Interventional Cardiologist:   Luiza Bunn MD   Referring Physician:           Ayo Harmon MD     Procedures Performed   Procedures:              1.    Ultrasound Guided Access     2.    Arterial Access - Right Femoral   3.    PCI: MARCK   4.    IVUS   5.    PCI: MARCK   6.    AngioSeal     Indications:               Myocardial infarction without ST elevation  (NSTEMI)    Conclusions:   Successful MARCK placementto the pLCx and the pLAD and mLAD. IVUS was  used for both coronary arteries during the    intervention to optimize the stent size.    Recommendations:     DAPT for 1 year. Aggressive lipid and DM rx. F/u with Dr. Harmon.   Acute complication:    No complications     Presentation:   Chief Compaint: NSTEMI - returning for staged PCI to the LAD and LCx     Procedure Narrative:   The risks and alternatives of the procedures and conscious sedation  were explained to the patient and informed consent was  obtained. The patient was brought to the cath lab and placed on the  exam table.  Access   Right femoral artery:   Vascular access was obtained and a 6FR SHEATH PINNACLE  was advanced  into the vessel.    Diagnostic Findings:     Coronary Angiography   LAD   Proximal left anterior descending: There is an 80 % stenosis. Mid left  anterior descending:There is a 70 % stenosis.    CX   Proximal circumflex: There is an 80 % stenosis.       ECHO TTE WO CON COMP W DOPP - 84494.m  Indication(s):     Chest pain, unspecified - R07.9  Procedure:         Transthoracic echocardiogram with 2-D, M-mode and complete                     spectral and color flow Doppler.  Ordering Location: [facility]  Study Information: Image quality for this study is technically difficult.    _______________________________________________________________________________________  CONCLUSIONS:      1. Technically difficult image quality.   2. The left ventricular systolic function is mildly decreased with an ejection fraction of 53 % by Arenas's method of disks.   3. There is subtle/mild hypokinesis of the basal to mid anterolateral wall.   4. There is mild (grade 1) left ventricular diastolic dysfunction.   5. Normal right ventricular cavity size and probably normal right ventricular systolic function.   6. The left atrium is normal.   7. Trace mitral regurgitation.   8. The aortic valve is tricuspid with normal structure without stenosis with normal systolic excursion.   9. No evidence of aortic regurgitation.  10. Trace tricuspid regurgitation.    ________________________________________________________________________________________  FINDINGS:     Left Ventricle:  The left ventricular systolic function is mildly decreased with a calculated ejection fraction of 53 % by the Arenas's biplane method of disks. There is mild (grade 1) left ventricular diastolic dysfunction. There is subtle/mild hypokinesis of the basal to mid anterolateral wall.     Right Ventricle:  Normal right ventricular cavity size and probably normal right ventricular systolic function. Tricuspid annular plane systolic excursion (TAPSE) is 2.0 cm (normal >=1.7 cm).     Left Atrium:  The left atrium is normal with an indexed volume of 23.33 ml/m².     Right Atrium:  The right atrium is normal.     Aortic Valve:  The aortic valve is tricuspid with normalstructure without stenosis with normal systolic excursion. There is mild thickening of the aortic valve leaflets. There is no evidence of aortic regurgitation.     Mitral Valve:  Structurally normal mitral valve with normal leaflet excursion. There is mitral valve thickening of the anterior and posterior leaflets. There is trace mitral regurgitation.     Tricuspid Valve:  There is trace tricuspid regurgitation.     Pulmonic Valve:  There is mild pulmonic regurgitation.  ____________________________________________________________________  Quantitative Data:  Left Ventricle Measurements: (Indexed to BSA)     IVSd (2D):   1.1 cm  LVPWd (2D):  1.1 cm  LVIDd (2D):  4.2 cm  LVIDs (2D):  3.0 cm  LV Mass:     152 g  88.5 g/m²  LV Vol d, MOD A2C: 59.5 ml 34.53 ml/m²  LV Vol d, MOD A4C: 78.2 ml 45.39 ml/m²  LV Vol d, MOD BP:  68.0 ml 39.47 ml/m²  LV Vol s, MOD A2C: 24.8 ml 14.39 ml/m²  LV Vol s, MOD A4C: 39.1 ml 22.69 ml/m²  LV Vol s, MOD BP:  31.9 ml 18.50 ml/m²  LVOT SV MOD BP:    36.1 ml  LV EF% MOD BP:     53 %     MV E Vmax:   0.73 m/s  MV A Vmax:   0.78 m/s  MV E/A:      0.93  e' medial:   6.96 cm/s  E/e' medial: 10.46  MV DT:       181 msec    Aorta Measurements:     Ao Root s, 2D: 3.0 cm  Ao Arch:       2.3 cm       Left Atrium Measurements: (Indexed to BSA)  LA Diam 2D: 2.80 cm    Right Ventricle Measurements:     TAPSE:            2.0 cm  TV Harmony. S':       9.90 cm/s  RV Base (RVID1):  2.6 cm  RV Mid (RVID2):   2.2 cm  RV Major (RVID3): 6.3 cm       LVOT / RVOT/ Qp/Qs Data: (Indexed to BSA)  LVOT Diameter: 2.00 cm    Mitral Valve Measurements:     MV E Vmax: 0.7 m/s  MV A Vmax: 0.8 m/s  MV E/A:    0.9    ________________________________________________________________________________________  Electronically signed on  9:12:08 PM by Faustino Heck               PAST MEDICAL & SURGICAL HISTORY:  Diabetes mellitus      Hypercholesteremia      No significant past surgical history          MEDICATIONS  (STANDING):  aspirin enteric coated 81 milliGRAM(s) Oral daily  atorvastatin 80 milliGRAM(s) Oral at bedtime  dextrose 5%. 1000 milliLiter(s) (100 mL/Hr) IV Continuous <Continuous>  dextrose 5%. 1000 milliLiter(s) (50 mL/Hr) IV Continuous <Continuous>  dextrose 50% Injectable 25 Gram(s) IV Push once  dextrose 50% Injectable 12.5 Gram(s) IV Push once  dextrose 50% Injectable 25 Gram(s) IV Push once  gabapentin 300 milliGRAM(s) Oral three times a day  glucagon  Injectable 1 milliGRAM(s) IntraMuscular once  insulin glargine Injectable (LANTUS) 15 Unit(s) SubCutaneous at bedtime  insulin lispro (ADMELOG) corrective regimen sliding scale   SubCutaneous three times a day before meals  insulin lispro (ADMELOG) corrective regimen sliding scale   SubCutaneous at bedtime  insulin lispro Injectable (ADMELOG) 5 Unit(s) SubCutaneous three times a day before meals  losartan 25 milliGRAM(s) Oral daily  metoprolol tartrate 25 milliGRAM(s) Oral every 12 hours  sodium chloride 0.9%. 500 milliLiter(s) (100 mL/Hr) IV Continuous <Continuous>  ticagrelor 90 milliGRAM(s) Oral every 12 hours    MEDICATIONS  (PRN):  acetaminophen     Tablet .. 650 milliGRAM(s) Oral every 6 hours PRN Temp greater or equal to 38C (100.4F), Mild Pain (1 - 3)  dextrose Oral Gel 15 Gram(s) Oral once PRN Blood Glucose LESS THAN 70 milliGRAM(s)/deciliter  fentaNYL    Injectable 25 MICROGram(s) IV Push every 4 hours PRN Severe Pain (7 - 10)  nitroglycerin     SubLingual 0.4 milliGRAM(s) SubLingual every 5 minutes PRN Chest Pain    Allergies: No Known Allergies                            14.8   13.47 )-----------( 176      ( 2023 05:16 )             42.9         134<L>  |  105  |  15  ----------------------------<  248<H>  4.3   |  24  |  0.83    Ca    8.8      2023 05:16    TPro  6.7  /  Alb  3.2<L>  /  TBili  0.5  /  DBili  x   /  AST  29  /  ALT  25  /  AlkPhos  70  06-    PTT - ( 2023 06:07 )  PTT:45.8 sec      Vital Signs Last 24 Hrs  T(C): 36.9 (2023 20:15), Max: 36.9 (2023 20:15)  T(F): 98.5 (2023 20:15), Max: 98.5 (2023 20:15)  HR: 70 (2023 06:00) (63 - 83)  BP: 126/58 (2023 06:00) (98/54 - 156/84)  BP(mean): 84 (2023 06:00) (65 - 102)  RR: 16 (2023 06:00) (13 - 19)  SpO2: 98% (2023 06:00) (95% - 99%)    Parameters below as of 2023 21:00  Patient On (Oxygen Delivery Method): room air           EC23-NSR HR 70, RAD, no acute changes    Constitutional: NAD, lying comfortably in bed  Neuro: A+O x 3, non-focal, speech clear and intact  HEENT: NC/AT, PERRL, EOMI, anicteric sclerae, oral mucosa pink and moist  Neck: supple, no JVD  CV: regular rate, regular rhythm, +S1S2, no murmurs or rub  Pulm/chest: lung sounds CTA and equal bilaterally, no accessory muscle use noted  Abd: soft, NT, ND, +BS  Ext: KAUR x 4, no C/C/E  Access site: R wrist C/D/I/soft without hematoma-tegadern drsg removed, distal motor/neuro/circ intact. R radial pulse 2+. RFA access site ( now with angioseal) without any drainage, bleeding or hematoma formation + DPs and PTs   Skin: warm, well perfused  Psych: calm, appropriate affect    Patient education related to:  Restricted use with no heavy lifting of affected arm for 48 hours.  No submerging the arm in water for 48 hours.  You may start showering today.  Call your doctor for any bleeding, swelling, loss of sensation in the hand or fingers, or fingers turning blue.  If heavy bleeding or large lumps form, hold pressure at the spot and come to the Emergency Room.  - Bruising at the groin, sometimes extending down the leg, and/or a small lump under the skin at the groin access site is normal and will resolve within 2 – 3 weeks.   - Occasional skipped beats or palpitations that last for a few beats are common and generally resolve within 1-2 months.   - You may walk and take stairs at a regular pace.   - Do not perform any exercise more strenuous than walking for 1 week.   - Do not strain or lift heavy objects for 1 week.  - You may shower the day after the procedure.  - Do not soak in water (such as tub baths, hot tubs, swimming, etc.) for 1 week.   - You may resume all other activities the day after the procedure.  Call your doctor if:   - you notice bleeding, redness, drainage, swelling, increased tenderness or a hot sensation around the catheter insertion site.   - your temperature is greater than 100 degrees F for more than 24 hours.  - your rapid heart rhythm returns.  - you have any questions or concerns regarding the procedure.  If significant bleeding and/or a large lump (the size of a golf ball or bigger) occurs:  - Lie flat and apply continuous direct pressure just above the puncture site for at least 10 minutes  - If the issue resolves, notify your physician immediately.    - If the bleeding cannot be controlled, please seek immediate medical attention.  If you experience increased difficulty breathing or chest pain, or if you faint or have dizzy spells, please seek immediate medical attention.

## 2023-06-30 ENCOUNTER — TRANSCRIPTION ENCOUNTER (OUTPATIENT)
Age: 53
End: 2023-06-30

## 2023-06-30 VITALS
RESPIRATION RATE: 17 BRPM | HEART RATE: 73 BPM | DIASTOLIC BLOOD PRESSURE: 74 MMHG | OXYGEN SATURATION: 98 % | SYSTOLIC BLOOD PRESSURE: 135 MMHG

## 2023-06-30 LAB
ALBUMIN SERPL ELPH-MCNC: 2.9 G/DL — LOW (ref 3.3–5)
ALP SERPL-CCNC: 69 U/L — SIGNIFICANT CHANGE UP (ref 40–120)
ALT FLD-CCNC: 32 U/L — SIGNIFICANT CHANGE UP (ref 12–78)
ANION GAP SERPL CALC-SCNC: 4 MMOL/L — LOW (ref 5–17)
ANION GAP SERPL CALC-SCNC: 7 MMOL/L — SIGNIFICANT CHANGE UP (ref 5–17)
AST SERPL-CCNC: 64 U/L — HIGH (ref 15–37)
BASOPHILS # BLD AUTO: 0.03 K/UL — SIGNIFICANT CHANGE UP (ref 0–0.2)
BASOPHILS NFR BLD AUTO: 0.3 % — SIGNIFICANT CHANGE UP (ref 0–2)
BILIRUB SERPL-MCNC: 0.6 MG/DL — SIGNIFICANT CHANGE UP (ref 0.2–1.2)
BUN SERPL-MCNC: 16 MG/DL — SIGNIFICANT CHANGE UP (ref 7–23)
BUN SERPL-MCNC: 18 MG/DL — SIGNIFICANT CHANGE UP (ref 7–23)
CALCIUM SERPL-MCNC: 8.6 MG/DL — SIGNIFICANT CHANGE UP (ref 8.5–10.1)
CALCIUM SERPL-MCNC: 8.7 MG/DL — SIGNIFICANT CHANGE UP (ref 8.5–10.1)
CHLORIDE SERPL-SCNC: 101 MMOL/L — SIGNIFICANT CHANGE UP (ref 96–108)
CHLORIDE SERPL-SCNC: 104 MMOL/L — SIGNIFICANT CHANGE UP (ref 96–108)
CK MB BLD-MCNC: 3 % — SIGNIFICANT CHANGE UP (ref 0–3.5)
CK MB CFR SERPL CALC: 13.5 NG/ML — HIGH (ref 0–3.6)
CK SERPL-CCNC: 456 U/L — HIGH (ref 26–308)
CO2 SERPL-SCNC: 28 MMOL/L — SIGNIFICANT CHANGE UP (ref 22–31)
CO2 SERPL-SCNC: 31 MMOL/L — SIGNIFICANT CHANGE UP (ref 22–31)
CREAT SERPL-MCNC: 1 MG/DL — SIGNIFICANT CHANGE UP (ref 0.5–1.3)
CREAT SERPL-MCNC: 1.1 MG/DL — SIGNIFICANT CHANGE UP (ref 0.5–1.3)
EGFR: 82 ML/MIN/1.73M2 — SIGNIFICANT CHANGE UP
EGFR: 92 ML/MIN/1.73M2 — SIGNIFICANT CHANGE UP
EOSINOPHIL # BLD AUTO: 0.06 K/UL — SIGNIFICANT CHANGE UP (ref 0–0.5)
EOSINOPHIL NFR BLD AUTO: 0.6 % — SIGNIFICANT CHANGE UP (ref 0–6)
GLUCOSE SERPL-MCNC: 239 MG/DL — HIGH (ref 70–99)
GLUCOSE SERPL-MCNC: 367 MG/DL — HIGH (ref 70–99)
HCT VFR BLD CALC: 41.7 % — SIGNIFICANT CHANGE UP (ref 39–50)
HCT VFR BLD CALC: 42.1 % — SIGNIFICANT CHANGE UP (ref 39–50)
HGB BLD-MCNC: 14 G/DL — SIGNIFICANT CHANGE UP (ref 13–17)
HGB BLD-MCNC: 14.3 G/DL — SIGNIFICANT CHANGE UP (ref 13–17)
IMM GRANULOCYTES NFR BLD AUTO: 0.5 % — SIGNIFICANT CHANGE UP (ref 0–0.9)
LYMPHOCYTES # BLD AUTO: 2.38 K/UL — SIGNIFICANT CHANGE UP (ref 1–3.3)
LYMPHOCYTES # BLD AUTO: 24.3 % — SIGNIFICANT CHANGE UP (ref 13–44)
MAGNESIUM SERPL-MCNC: 2.1 MG/DL — SIGNIFICANT CHANGE UP (ref 1.6–2.6)
MCHC RBC-ENTMCNC: 29.3 PG — SIGNIFICANT CHANGE UP (ref 27–34)
MCHC RBC-ENTMCNC: 29.4 PG — SIGNIFICANT CHANGE UP (ref 27–34)
MCHC RBC-ENTMCNC: 33.6 GM/DL — SIGNIFICANT CHANGE UP (ref 32–36)
MCHC RBC-ENTMCNC: 34 GM/DL — SIGNIFICANT CHANGE UP (ref 32–36)
MCV RBC AUTO: 86.4 FL — SIGNIFICANT CHANGE UP (ref 80–100)
MCV RBC AUTO: 87.2 FL — SIGNIFICANT CHANGE UP (ref 80–100)
MONOCYTES # BLD AUTO: 0.79 K/UL — SIGNIFICANT CHANGE UP (ref 0–0.9)
MONOCYTES NFR BLD AUTO: 8.1 % — SIGNIFICANT CHANGE UP (ref 2–14)
NEUTROPHILS # BLD AUTO: 6.48 K/UL — SIGNIFICANT CHANGE UP (ref 1.8–7.4)
NEUTROPHILS NFR BLD AUTO: 66.2 % — SIGNIFICANT CHANGE UP (ref 43–77)
NRBC # BLD: 0 /100 WBCS — SIGNIFICANT CHANGE UP (ref 0–0)
NRBC # BLD: 0 /100 WBCS — SIGNIFICANT CHANGE UP (ref 0–0)
PLATELET # BLD AUTO: 176 K/UL — SIGNIFICANT CHANGE UP (ref 150–400)
PLATELET # BLD AUTO: 185 K/UL — SIGNIFICANT CHANGE UP (ref 150–400)
POTASSIUM SERPL-MCNC: 4.1 MMOL/L — SIGNIFICANT CHANGE UP (ref 3.5–5.3)
POTASSIUM SERPL-MCNC: 4.9 MMOL/L — SIGNIFICANT CHANGE UP (ref 3.5–5.3)
POTASSIUM SERPL-SCNC: 4.1 MMOL/L — SIGNIFICANT CHANGE UP (ref 3.5–5.3)
POTASSIUM SERPL-SCNC: 4.9 MMOL/L — SIGNIFICANT CHANGE UP (ref 3.5–5.3)
PROT SERPL-MCNC: 6.8 G/DL — SIGNIFICANT CHANGE UP (ref 6–8.3)
RBC # BLD: 4.78 M/UL — SIGNIFICANT CHANGE UP (ref 4.2–5.8)
RBC # BLD: 4.87 M/UL — SIGNIFICANT CHANGE UP (ref 4.2–5.8)
RBC # FLD: 12.6 % — SIGNIFICANT CHANGE UP (ref 10.3–14.5)
RBC # FLD: 12.8 % — SIGNIFICANT CHANGE UP (ref 10.3–14.5)
SODIUM SERPL-SCNC: 136 MMOL/L — SIGNIFICANT CHANGE UP (ref 135–145)
SODIUM SERPL-SCNC: 139 MMOL/L — SIGNIFICANT CHANGE UP (ref 135–145)
TROPONIN I, HIGH SENSITIVITY RESULT: HIGH NG/L
WBC # BLD: 10.35 K/UL — SIGNIFICANT CHANGE UP (ref 3.8–10.5)
WBC # BLD: 9.79 K/UL — SIGNIFICANT CHANGE UP (ref 3.8–10.5)
WBC # FLD AUTO: 10.35 K/UL — SIGNIFICANT CHANGE UP (ref 3.8–10.5)
WBC # FLD AUTO: 9.79 K/UL — SIGNIFICANT CHANGE UP (ref 3.8–10.5)

## 2023-06-30 PROCEDURE — 99231 SBSQ HOSP IP/OBS SF/LOW 25: CPT

## 2023-06-30 PROCEDURE — 71045 X-RAY EXAM CHEST 1 VIEW: CPT

## 2023-06-30 PROCEDURE — 82962 GLUCOSE BLOOD TEST: CPT

## 2023-06-30 PROCEDURE — 80053 COMPREHEN METABOLIC PANEL: CPT

## 2023-06-30 PROCEDURE — 80061 LIPID PANEL: CPT

## 2023-06-30 PROCEDURE — 93306 TTE W/DOPPLER COMPLETE: CPT

## 2023-06-30 PROCEDURE — 93005 ELECTROCARDIOGRAM TRACING: CPT

## 2023-06-30 PROCEDURE — C9601: CPT | Mod: RC

## 2023-06-30 PROCEDURE — C1753: CPT

## 2023-06-30 PROCEDURE — C1876: CPT

## 2023-06-30 PROCEDURE — 93010 ELECTROCARDIOGRAM REPORT: CPT

## 2023-06-30 PROCEDURE — C1887: CPT

## 2023-06-30 PROCEDURE — C9600: CPT | Mod: RC

## 2023-06-30 PROCEDURE — C1760: CPT

## 2023-06-30 PROCEDURE — 85730 THROMBOPLASTIN TIME PARTIAL: CPT

## 2023-06-30 PROCEDURE — 84484 ASSAY OF TROPONIN QUANT: CPT

## 2023-06-30 PROCEDURE — 92978 ENDOLUMINL IVUS OCT C 1ST: CPT | Mod: LD

## 2023-06-30 PROCEDURE — 83036 HEMOGLOBIN GLYCOSYLATED A1C: CPT

## 2023-06-30 PROCEDURE — C1769: CPT

## 2023-06-30 PROCEDURE — 99232 SBSQ HOSP IP/OBS MODERATE 35: CPT

## 2023-06-30 PROCEDURE — C1874: CPT

## 2023-06-30 PROCEDURE — C1725: CPT

## 2023-06-30 PROCEDURE — 36415 COLL VENOUS BLD VENIPUNCTURE: CPT

## 2023-06-30 PROCEDURE — 82553 CREATINE MB FRACTION: CPT

## 2023-06-30 PROCEDURE — C1894: CPT

## 2023-06-30 PROCEDURE — 85379 FIBRIN DEGRADATION QUANT: CPT

## 2023-06-30 PROCEDURE — 82550 ASSAY OF CK (CPK): CPT

## 2023-06-30 PROCEDURE — 83735 ASSAY OF MAGNESIUM: CPT

## 2023-06-30 PROCEDURE — 93458 L HRT ARTERY/VENTRICLE ANGIO: CPT | Mod: 59

## 2023-06-30 PROCEDURE — 85025 COMPLETE CBC W/AUTO DIFF WBC: CPT

## 2023-06-30 PROCEDURE — 85027 COMPLETE CBC AUTOMATED: CPT

## 2023-06-30 PROCEDURE — 99239 HOSP IP/OBS DSCHRG MGMT >30: CPT | Mod: GC

## 2023-06-30 PROCEDURE — 84443 ASSAY THYROID STIM HORMONE: CPT

## 2023-06-30 PROCEDURE — 99285 EMERGENCY DEPT VISIT HI MDM: CPT | Mod: 25

## 2023-06-30 PROCEDURE — 80048 BASIC METABOLIC PNL TOTAL CA: CPT

## 2023-06-30 PROCEDURE — 92979 ENDOLUMINL IVUS OCT C EA: CPT | Mod: LC

## 2023-06-30 RX ORDER — INSULIN LISPRO 100/ML
7 VIAL (ML) SUBCUTANEOUS
Refills: 0 | Status: DISCONTINUED | OUTPATIENT
Start: 2023-06-30 | End: 2023-06-30

## 2023-06-30 RX ORDER — ISOPROPYL ALCOHOL, BENZOCAINE .7; .06 ML/ML; ML/ML
0 SWAB TOPICAL
Qty: 100 | Refills: 1
Start: 2023-06-30

## 2023-06-30 RX ORDER — SITAGLIPTIN 50 MG/1
1 TABLET, FILM COATED ORAL
Qty: 30 | Refills: 0
Start: 2023-06-30 | End: 2023-07-29

## 2023-06-30 RX ORDER — SITAGLIPTIN 50 MG/1
1 TABLET, FILM COATED ORAL
Refills: 0 | DISCHARGE

## 2023-06-30 RX ORDER — INSULIN LISPRO 100/ML
7 VIAL (ML) SUBCUTANEOUS
Qty: 3 | Refills: 0
Start: 2023-06-30 | End: 2023-07-29

## 2023-06-30 RX ORDER — INSULIN GLARGINE 100 [IU]/ML
30 INJECTION, SOLUTION SUBCUTANEOUS
Qty: 5 | Refills: 3
Start: 2023-06-30 | End: 2023-10-27

## 2023-06-30 RX ORDER — INSULIN LISPRO 100/ML
7 VIAL (ML) SUBCUTANEOUS
Qty: 5 | Refills: 3
Start: 2023-06-30 | End: 2023-10-27

## 2023-06-30 RX ORDER — INSULIN LISPRO 100/ML
7 VIAL (ML) SUBCUTANEOUS
Qty: 1 | Refills: 0
Start: 2023-06-30 | End: 2023-07-29

## 2023-06-30 RX ORDER — INSULIN GLARGINE 100 [IU]/ML
30 INJECTION, SOLUTION SUBCUTANEOUS
Qty: 5 | Refills: 0
Start: 2023-06-30 | End: 2023-07-29

## 2023-06-30 RX ORDER — INSULIN GLARGINE 100 [IU]/ML
25 INJECTION, SOLUTION SUBCUTANEOUS
Qty: 3 | Refills: 0
Start: 2023-06-30 | End: 2023-07-29

## 2023-06-30 RX ADMIN — Medication 25 MILLIGRAM(S): at 17:44

## 2023-06-30 RX ADMIN — LOSARTAN POTASSIUM 25 MILLIGRAM(S): 100 TABLET, FILM COATED ORAL at 05:35

## 2023-06-30 RX ADMIN — Medication 5 UNIT(S): at 08:15

## 2023-06-30 RX ADMIN — Medication 7 UNIT(S): at 17:45

## 2023-06-30 RX ADMIN — Medication 8: at 12:25

## 2023-06-30 RX ADMIN — Medication 25 MILLIGRAM(S): at 05:35

## 2023-06-30 RX ADMIN — Medication 7 UNIT(S): at 12:26

## 2023-06-30 RX ADMIN — Medication 2: at 17:45

## 2023-06-30 RX ADMIN — Medication 81 MILLIGRAM(S): at 12:25

## 2023-06-30 RX ADMIN — Medication 6: at 08:16

## 2023-06-30 RX ADMIN — GABAPENTIN 300 MILLIGRAM(S): 400 CAPSULE ORAL at 05:34

## 2023-06-30 RX ADMIN — GABAPENTIN 300 MILLIGRAM(S): 400 CAPSULE ORAL at 14:46

## 2023-06-30 RX ADMIN — TICAGRELOR 90 MILLIGRAM(S): 90 TABLET ORAL at 17:44

## 2023-06-30 RX ADMIN — TICAGRELOR 90 MILLIGRAM(S): 90 TABLET ORAL at 05:35

## 2023-06-30 NOTE — PROGRESS NOTE ADULT - PROBLEM SELECTOR PLAN 1
lantus increased 25 units qhs  increase admelog 7 units 3x/day before meals  cont mod dose admelog corrective scale coverage qac/qhs  cont cons cho diet  goal bg 100-180 in hosp setting
- Given ASA 324mg in ED  - Continue heparin gtt  - Trend troponin to peak   - Check A1c, TSH, lipid panel   - Continue ASA, statin  - s/p cardiac cath 6/27 with MARCK x2, plan for staged PCI today (6/28)  - F/u TTE to evaluate LV function and wall motion abnormalities  - Cardiology Dr. Harmon consulted, f/u recs
Type 2 A1c 11.2% adm NSTEMI  Recommend endocrine-[facility] onconsult  FU appt: Endo Dr. Kenneth mcfadden  DSC recommendations: return to home with modified regimen and glucose monitoring, lifestyle modifications  diabetes education provided as documented above  Diabetes support info and cell # [phone] given   Goal 100-180 mg/dL; 140-180 mg/dL in critical care areas
Type 2 A1c 11.2% adm NSTEMI  increase lantus to 25 units @ HS  c/w admelog premeal 5 units +MISS  CC diet and accucheck ACHS  Recommend endocrine-[facility]lman onconsult  FU appt: TBA, Dr Kenneth Toth 3 weeks  DSC recommendations: return to home increased regimen and glucose monitoring  diabetes education provided as documented above  Diabetes support info and cell # [phone] given   Goal 100-180 mg/dL; 140-180 mg/dL in critical care areas
- s/p cath, found to have triple vessel disease.  s/p MARCK to mRCA and RPL via right radial artery on 6/27  - s/p staged PCI to proximal and medial LAD and pLCx on 6/28.    - Trend troponin to peak- still uptrending this AM, will need to be downtrending prior to D/C  - Continue DAPT, statin, B-blocker, ARB  - F/u TTE to evaluate LV function and wall motion abnormalities  - Cardiology Dr. Harmon consulted, f/u recs

## 2023-06-30 NOTE — PROGRESS NOTE ADULT - SUBJECTIVE AND OBJECTIVE BOX
[facility] -- Eden Ferris Pannella, Patel, Savella, Goodger  Office # [phone]    Follow Up:  NSTEMI, TVD    Subjective/Observations: Laying supine with no orthopnea.  Denies CP overnight.  Denies right groin pain.  Denies numbness/tingling of right hand and RLE.  No arrhythmias on tele overnight    REVIEW OF SYSTEMS: All other review of systems is negative unless indicated above  PAST MEDICAL & SURGICAL HISTORY:  Diabetes mellitus  Hypercholesteremia  No significant past surgical history    MEDICATIONS  (STANDING):  aspirin enteric coated 81 milliGRAM(s) Oral daily  atorvastatin 80 milliGRAM(s) Oral at bedtime  dextrose 5%. 1000 milliLiter(s) (100 mL/Hr) IV Continuous <Continuous>  dextrose 5%. 1000 milliLiter(s) (50 mL/Hr) IV Continuous <Continuous>  dextrose 50% Injectable 25 Gram(s) IV Push once  dextrose 50% Injectable 12.5 Gram(s) IV Push once  dextrose 50% Injectable 25 Gram(s) IV Push once  gabapentin 300 milliGRAM(s) Oral three times a day  glucagon  Injectable 1 milliGRAM(s) IntraMuscular once  insulin glargine Injectable (LANTUS) 25 Unit(s) SubCutaneous at bedtime  insulin lispro (ADMELOG) corrective regimen sliding scale   SubCutaneous three times a day before meals  insulin lispro (ADMELOG) corrective regimen sliding scale   SubCutaneous at bedtime  insulin lispro Injectable (ADMELOG) 5 Unit(s) SubCutaneous three times a day before meals  losartan 25 milliGRAM(s) Oral daily  metoprolol tartrate 25 milliGRAM(s) Oral every 12 hours  sodium chloride 0.9%. 500 milliLiter(s) (100 mL/Hr) IV Continuous <Continuous>  ticagrelor 90 milliGRAM(s) Oral every 12 hours    MEDICATIONS  (PRN):  acetaminophen     Tablet .. 650 milliGRAM(s) Oral every 6 hours PRN Temp greater or equal to 38C (100.4F), Mild Pain (1 - 3)  dextrose Oral Gel 15 Gram(s) Oral once PRN Blood Glucose LESS THAN 70 milliGRAM(s)/deciliter  fentaNYL    Injectable 25 MICROGram(s) IV Push every 4 hours PRN Severe Pain (7 - 10)  nitroglycerin     SubLingual 0.4 milliGRAM(s) SubLingual every 5 minutes PRN Chest Pain    Allergies    No Known Allergies    Intolerances    Vital Signs Last 24 Hrs  T(C): 37 (30 Jun 2023 04:00), Max: 37 (30 Jun 2023 04:00)  T(F): 98.6 (30 Jun 2023 04:00), Max: 98.6 (30 Jun 2023 04:00)  HR: 75 (30 Jun 2023 04:00) (65 - 82)  BP: 110/61 (30 Jun 2023 04:00) (86/52 - 125/60)  BP(mean): 80 (30 Jun 2023 04:00) (63 - 88)  RR: 18 (30 Jun 2023 04:00) (18 - 27)  SpO2: 97% (30 Jun 2023 04:00) (96% - 98%)    Parameters below as of 29 Jun 2023 19:00  Patient On (Oxygen Delivery Method): room air    I&O's Summary    29 Jun 2023 07:01  -  30 Jun 2023 07:00  --------------------------------------------------------  IN: 600 mL / OUT: 700 mL / NET: -100 mL     PHYSICAL EXAM:  TELE: NSR  Constitutional: NAD, awake and alert, well-developed  HEENT: Moist Mucous Membranes, Anicteric  Pulmonary: Non-labored, breath sounds are clear bilaterally, No wheezing, rales or rhonchi  Cardiovascular: Regular, S1 and S2, No murmurs, rubs, gallops or clicks  Gastrointestinal: Bowel Sounds present, soft, nontender.   Lymph: No peripheral edema. No lymphadenopathy.  Skin: No visible rashes or ulcers.  Psych:  Mood & affect appropriate  LABS: All Labs Reviewed:                        14.3   10.35 )-----------( 176      ( 30 Jun 2023 04:58 )             42.1                         14.8   13.47 )-----------( 176      ( 29 Jun 2023 05:16 )             42.9                         14.6   11.18 )-----------( 189      ( 28 Jun 2023 23:46 )             42.7     30 Jun 2023 04:58    139    |  104    |  16     ----------------------------<  239    4.9     |  31     |  1.00   29 Jun 2023 05:16    134    |  105    |  15     ----------------------------<  248    4.3     |  24     |  0.83   28 Jun 2023 23:46    136    |  105    |  14     ----------------------------<  205    4.1     |  25     |  0.76     Ca    8.7        30 Jun 2023 04:58  Ca    8.8        29 Jun 2023 05:16  Ca    8.5        28 Jun 2023 23:46  Mg     2.1       30 Jun 2023 04:58    TPro  6.7    /  Alb  3.2    /  TBili  0.5    /  DBili  x      /  AST  29     /  ALT  25     /  AlkPhos  70     28 Jun 2023 06:07  CARDIAC MARKERS ( 30 Jun 2023 04:58 )  x     / x     / 456 U/L / x     / 13.5 ng/mL  CARDIAC MARKERS ( 29 Jun 2023 12:45 )  x     / x     / 819 U/L / x     / 44.8 ng/mL  CARDIAC MARKERS ( 29 Jun 2023 05:16 )  x     / x     / 924 U/L / x     / 65.5 ng/mL  CARDIAC MARKERS ( 28 Jun 2023 23:46 )  x     / x     / 700 U/L / x     / 49.5 ng/mL    TRANSTHORACIC ECHOCARDIOGRAM REPORT  ________________________________________________________________________________                                      _______    Portions of this table do not appear on this page.     Pt. Name:       DANIEL STANFORD Study Date:    6/28/2023  MRN:            [id number]      YOB: 1970  Accession #:    [id number]     Age:           52 years  Account#:       [id number]    Gender:        M  Heart Rate:                   Height:        64.17 in (163.00 cm)  Rhythm:                       Weight:        147.71 lb (67.00 kg)  Blood Pressure: 141/76 mmHg   BSA/BMI:       1.72 m² / 25.22 kg/m²  ________________________________________________________________________________________  Referring Physician:  [id number] Alecia Reed  Interpreting Physician: Faustino Heck  Primary Sonographer:    AS    CPT:               ECHO TTE WO CON COMP W DOPP - 36479.m  Indication(s):     Chest pain, unspecified - R07.9  Procedure:         Transthoracic echocardiogram with 2-D, M-mode and complete                     spectral and color flow Doppler.  Ordering Location: [facility]  Study Information: Image quality for this study is technically difficult.    _______________________________________________________________________________________  CONCLUSIONS:      1. Technically difficult image quality.   2. The left ventricular systolic function is mildly decreased with an ejection fraction of 53 % by Arenas's method of disks.   3. There is subtle/mild hypokinesis of the basal to mid anterolateral wall.   4. There is mild (grade 1) left ventricular diastolic dysfunction.   5. Normal right ventricular cavity size and probably normal right ventricular systolic function.   6. The left atrium is normal.   7. Trace mitral regurgitation.   8. The aortic valve is tricuspid with normal structure without stenosis with normal systolic excursion.   9. No evidence of aortic regurgitation.  10. Trace tricuspid regurgitation.  _______________________________________________________________________________________  FINDINGS:     Left Ventricle:  The left ventricular systolic function is mildly decreased with a calculated ejection fraction of 53 % by the Arenas's biplane method of disks. There is mild (grade 1) left ventricular diastolic dysfunction. There is subtle/mild hypokinesis of the basal to mid anterolateral wall.     Right Ventricle:  Normal right ventricular cavity size and probably normal right ventricular systolic function. Tricuspid annular plane systolic excursion (TAPSE) is 2.0 cm (normal >=1.7 cm).     Left Atrium:  The left atrium is normal with an indexed volume of 23.33 ml/m².     Right Atrium:  The right atrium is normal.     Aortic Valve:  The aortic valve is tricuspid with normalstructure without stenosis with normal systolic excursion. There is mild thickening of the aortic valve leaflets. There is no evidence of aortic regurgitation.     Mitral Valve:  Structurally normal mitral valve with normal leaflet excursion. There is mitral valve thickening of the anterior and posterior leaflets. There is trace mitral regurgitation.     Tricuspid Valve:  There is trace tricuspid regurgitation.     Pulmonic Valve:  There is mild pulmonic regurgitation.  ____________________________________________________________________  Quantitative Data:  Left Ventricle Measurements: (Indexed to BSA)     IVSd (2D):   1.1 cm  LVPWd (2D):  1.1 cm  LVIDd (2D):  4.2 cm  LVIDs (2D):  3.0 cm  LV Mass:     152 g  88.5 g/m²  LV Vol d, MOD A2C: 59.5 ml 34.53 ml/m²  LV Vol d, MOD A4C: 78.2 ml 45.39 ml/m²  LV Vol d, MOD BP:  68.0 ml 39.47 ml/m²  LV Vol s, MOD A2C: 24.8 ml 14.39 ml/m²  LV Vol s, MOD A4C: 39.1 ml 22.69 ml/m²  LV Vol s, MOD BP:  31.9 ml 18.50 ml/m²  LVOT SV MOD BP:    36.1 ml  LV EF% MOD BP:     53 %     MV E Vmax:   0.73 m/s  MV A Vmax:   0.78 m/s  MV E/A:      0.93  e' medial:   6.96 cm/s  E/e' medial: 10.46  MV DT:       181 msec  Aorta Measurements:     Ao Root s, 2D: 3.0 cm  Ao Arch:       2.3 cm  Left Atrium Measurements: (Indexed to BSA)  LA Diam 2D: 2.80 cm    Right Ventricle Measurements:     TAPSE:            2.0 cm  TV [place]. S':       9.90 cm/s  RV Base (RVID1):  2.6 cm  RV Mid (RVID2):   2.2 cm  RV Major (RVID3): 6.3 cm  LVOT / RVOT/ Qp/Qs Data: (Indexed to BSA)  LVOT Diameter: 2.00 cm    Mitral Valve Measurements:     MV E Vmax: 0.7 m/s  MV A Vmax: 0.8 m/s  MV E/A:    0.9    ________________________________________________________________________________________  Electronically signed on 6/28/2023at 9:12:08 PM by Faustino Heck    *** Final ***

## 2023-06-30 NOTE — PROGRESS NOTE ADULT - SUBJECTIVE AND OBJECTIVE BOX
CAPILLARY BLOOD GLUCOSE      POCT Blood Glucose.: 268 mg/dL (30 Jun 2023 08:12)  POCT Blood Glucose.: 280 mg/dL (29 Jun 2023 21:44)  POCT Blood Glucose.: 297 mg/dL (29 Jun 2023 17:09)  POCT Blood Glucose.: 306 mg/dL (29 Jun 2023 12:40)  POCT Blood Glucose.: 358 mg/dL (29 Jun 2023 11:33)      Vital Signs Last 24 Hrs  T(C): 37 (30 Jun 2023 04:00), Max: 37 (30 Jun 2023 04:00)  T(F): 98.6 (30 Jun 2023 04:00), Max: 98.6 (30 Jun 2023 04:00)  HR: 75 (30 Jun 2023 04:00) (65 - 82)  BP: 110/61 (30 Jun 2023 04:00) (86/52 - 125/60)  BP(mean): 80 (30 Jun 2023 04:00) (63 - 88)  RR: 18 (30 Jun 2023 04:00) (18 - 27)  SpO2: 97% (30 Jun 2023 04:00) (96% - 98%)    Parameters below as of 29 Jun 2023 19:00  Patient On (Oxygen Delivery Method): room air        General: WN/WD NAD  Respiratory: CTA B/L  CV: RRR, S1S2, no murmurs, rubs or gallops  Abdominal: Soft, NT, ND +BS, Last BM  Extremities: No edema, + peripheral pulses     06-30    139  |  104  |  16  ----------------------------<  239<H>  4.9   |  31  |  1.00    Ca    8.7      30 Jun 2023 04:58  Mg     2.1     06-30        atorvastatin 80 milliGRAM(s) Oral at bedtime  dextrose 50% Injectable 25 Gram(s) IV Push once  dextrose 50% Injectable 12.5 Gram(s) IV Push once  dextrose 50% Injectable 25 Gram(s) IV Push once  dextrose Oral Gel 15 Gram(s) Oral once PRN  glucagon  Injectable 1 milliGRAM(s) IntraMuscular once  insulin glargine Injectable (LANTUS) 25 Unit(s) SubCutaneous at bedtime  insulin lispro (ADMELOG) corrective regimen sliding scale   SubCutaneous three times a day before meals  insulin lispro (ADMELOG) corrective regimen sliding scale   SubCutaneous at bedtime  insulin lispro Injectable (ADMELOG) 5 Unit(s) SubCutaneous three times a day before meals

## 2023-06-30 NOTE — PHARMACOTHERAPY INTERVENTION NOTE - COMMENTS
Meds to beds requested by provider.    The following prescriptions were filled at [facility]:  Aspirin 81 mg daily  Brilinta 90 mg BID  Metoprolol tartrate 25 mg BID  Atorvastatin 80 mg QHS  Insulin lispro pen 7 units TID  Insulin glargine pen 30 units QHS  Metformin 500 mg BID  Diabetes testing supplies    Medications delivered by pharmacist at bedside and counseled on indication, directions, and side effects with  #[id number]  Patient verbalized understanding and had no further questions    Yue Witt, [place]  Clinical Pharmacy Specialist [phone]

## 2023-06-30 NOTE — PROGRESS NOTE ADULT - PROVIDER SPECIALTY LIST ADULT
Cardiology
Intervent Cardiology
Cardiology
Diabetes
Intervent Cardiology
Intervent Cardiology
Cardiology
Endocrinology
Hospitalist
Hospitalist
Diabetes

## 2023-06-30 NOTE — DISCHARGE NOTE NURSING/CASE MANAGEMENT/SOCIAL WORK - PATIENT PORTAL LINK FT
You can access the FollowMyHealth Patient Portal offered by [facility] by registering at the following website: [email or web address]. By joining [facility]’s FollowMyHealth portal, you will also be able to view your health information using other applications (apps) compatible with our system.

## 2023-06-30 NOTE — DISCHARGE NOTE NURSING/CASE MANAGEMENT/SOCIAL WORK - NSDCPEFALRISK_GEN_ALL_CORE
For information on Fall & Injury Prevention, visit: [email or web address] OR  [email or web address] OR  https://www.cdc.gov/steadi/patient.html

## 2023-06-30 NOTE — PROGRESS NOTE ADULT - NS ATTEND AMEND GEN_ALL_CORE FT
cp/nstemi  s/p MARCK to mRCA and RPL   for staged pci to lad/lcx today  dapt, statin bb/arb  did have some cp overnight, though now resolved.  echocardiogram
cp/nstemi  s/p MARCK to mRCA and RPL   then pci to lad/lcx yesterday  did have cp overnight, likely from a pinched diagonal  dapt, statin bb/arb  ef ok, with mild ant lat hypokinesis  trop still going up, and cont to trend  need to see trending down prior to dc
s/p aggressive multivessel revascularization  course complicated by elev trop/cpk likely from pinched diagonal branch  sx free and ready for dc  close outpt followup

## 2023-06-30 NOTE — PROGRESS NOTE ADULT - ASSESSMENT
52 year old male with PMH HTN, HLD, DM presented with CP. Admitted with NSTEMI.     6/27 s/p LHC (multivessel CAD, low syntax score) with MARCK to RPL (90% stenosis) and mRCA (80% stenosis).   6/28 For staged PCI to LAD and Circ  Admited to and remains in CPU for observation post NSTEMI / PCI  started on heparin overnight for CP/UA. Will continue until LHC/PCI this afternoon.  pre PCI IVF ordered and NPO after light breakfast  OOB to chair  TTE pending  loaded with ticagrelor in cath lab  continue dual anti platelet therapy with aspirin AND ticagrelor     *[facility] in [place] called, 90 day Rx for ticagrelor reported to have $1 copay per pharmacist  Pt education provided/reinforced re: importance of strict adherence to uninterrupted DAPT for 12 months  cardiac rehab info provided/referral and communication to cardiac rehab to be sent  statin increased to high intensity, beta-blocker added, continue ARB  Lifestyle modifications discussed to reduce cardiovascular risk factors including weight reduction, smoking cessation (referral provided if applicable), medication compliance, and routine follow up with Cardiologist to track your BMI, cholesterol, and glucose levels.   follow-up on with Dr Bunn for post PCI check 7/7  follow-up in 2 weeks with outpatient/referring cardiologist  continue home medication regimen as appropriate  DM regimen and FS per medicine team and CDE consulted  remainder of plan per primary team/non-interventional cardiology   
53 yo male with PMH of T2DM, HLD, who presented for chest pain. Admitted for NSTEMI, cardiology consulted.    NSTEMI   - EKG with NSR, non specific abnormalities in T waves. No ST abnormalities seen, no changes from initial to 2nd EKG.   - s/p cath, found to have TVD.  s/p MARCK to mRCA and RPL via right radial artery.  LV gram = 50%  - For staged PCI to LAD and LCx today, 6/28.  Keep NPO  - For CP overnight, Heparin gtt resumed.  Can D/C  - hsT still uptrending.  Continue to cycle till peaks  - Continue DAPT, BB, ARB, and high-intensity statin    - BP stable and controlled except for one episode of hypotension overnight  - No signs of volume overload on exam.   - No evidence of cardiac arrhythmia    - Monitor electrolytes, replete to keep K>4 and Mag>2  - Will continue to follow  - He will f/u with us as outpatient     Madison Bales DNP, NP-C, AGACNP-C  Cardiology   Call TEAMS    
On  CPU for overnight observation post PCI  Pt is already in-patient on CPU  post cath/PCI routine VS, access site, neuro-vascular monitoring and RUE ***R groin/RLE post access precautions ordered  Awaiting repeat cardiac enzymes this am for trending to determine discharge status   May get OOB as tolerated  f/u labs and EKG this am reviewed-await repeat enzymes  continue dual anti platelet therapy with aspirin AND ticagrelor ( cost assessed to be manageable under pt's insurance )   Pt education provided/reinforced re: importance of strict adherence to uninterrupted DAPT for minimum of 3-12 months (cardiologist will determine duration)  continue high intensity statin, beta blocker, ARB  Diabetes Specialist following   Consult re: financial issues with diabetic medications acquisition  may resume prior diet  Lifestyle modifications discussed to reduce cardiovascular risk factors including weight reduction, smoking cessation (referral provided if applicable), medication compliance, and routine follow up with Cardiologist to track your BMI, cholesterol, and glucose levels. (Diabetes Management)  follow-up on Friday July 7th @ 10 am with Dr Bunn for post PCI check ( appt given)  follow-up in 2 weeks with outpatient/referring cardiologist  continue home medication regimen as appropriate  remainder of plan per primary team/ Hospitalist/ Cardiology  cardiac rehab info provided/referral and communication to cardiac rehab completed    above d/w Cardiology Team
52y  Male is s/p NSTEMI, now s/p left heart catheterization/ Staged PCI-mRCA & RPL via RRA (06/27), PCI x2 to LAD & LCX via RFA (6/28) ( now with Angioseal closure agent) by Dr. Luiza Bunn    Stable for discharge by Hospitalist Team from IC standpoint  Post cath/PCI routine VS, access site, neuro-vascular monitoring and RUE ***R groin/RLE post access precautions / both access sites stable   OOB as tolerated  Cardiac enzymes down trending  continue dual anti platelet therapy with aspirin AND ticagrelor ( cost assessed to be manageable under pt's insurance )   Pt education provided/reinforced re: importance of strict adherence to uninterrupted DAPT for minimum of 3-12 months (cardiologist will determine duration)  continue high intensity statin, beta blocker, ARB  Diabetes Specialist following/ plan in progress   Consult Appreciated re: financial issues with diabetic medications acquisition  Lifestyle modifications discussed to reduce cardiovascular risk factors including weight reduction, smoking cessation (referral provided if applicable), medication compliance, and routine follow up with Cardiologist to track your BMI, cholesterol, and glucose levels. (Diabetes Management)  Follow-up on Friday July 7th @ 10 am with Dr Bunn for post PCI check ( appt given)  Follow-up in 2 weeks with outpatient/referring cardiologist  Continue home medication regimen as appropriate  Remainder of plan per primary team/ Hospitalist/ Cardiology  cardiac rehab info provided/referral and communication to cardiac rehab completed  above d/w Cardiology Team
53 yo male with PMH of T2DM, HLD, who presented for chest pain. Admitted for NSTEMI, cardiology consulted.    NSTEMI   - EKG with NSR, non specific abnormalities in T waves.  No ST abnormalities seen, no changes from initial to 2nd EKG.   - s/p cath, found to have TVD.  s/p MARCK to mRCA and RPL via right radial artery.  LV gram = 50%  - s/p staged PCI to p/mLAD and pLCx, 6/28.    - hsT downtrended.  No further CP  - Continue DAPT, BB, ARB, and high-intensity statin  - If CP recurrent, start Imdur 30 mg daily  - TTE showed EF 53% with mild hypokinesis of basal to mid anterolateral walls, grade 1 DD, no significant valvular disease    - BP stable and controlled except for one episode of hypotension which can be an outlier  - No signs of volume overload on exam.   - No evidence of cardiac arrhythmia    - Monitor electrolytes, replete to keep K>4 and Mag>2  - Will continue to follow  - He will f/u with us as outpatient in 2 weeks.      Madison Bales DNP, NP-C, AGACNP-C  Cardiology   Call TEAMS        
53 yo male with PMH of T2DM, HLD, who presented for chest pain. Admitted for NSTEMI, cardiology consulted.    NSTEMI   - EKG with NSR, non specific abnormalities in T waves.  No ST abnormalities seen, no changes from initial to 2nd EKG.   - s/p cath, found to have TVD.  s/p MARCK to mRCA and RPL via right radial artery.  LV gram = 50%  - s/p staged PCI to p/mLAD and pLCx, 6/28.    - hsT uptrended significantly overnight with CP.  Continue to cycle till downtrends  - EKG stat this morning, 6/29  - Continue DAPT, BB, ARB, and high-intensity statin  - If CP recurrent, start Imdur 30 mg daily    - BP stable and controlled except for one episode of hypotension overnight at systolic 90's  - No signs of volume overload on exam.   - No evidence of cardiac arrhythmia    - Monitor electrolytes, replete to keep K>4 and Mag>2  - Will continue to follow  - He will f/u with us as outpatient.  Will hold D/C till hsT trends down     Madison Bales DNP, NP-C, AGACNP-C  Cardiology   Call TEAMS      
Physical Exam:   Vital Signs Last 24 Hrs  T(C): 36.8 (29 Jun 2023 12:00), Max: 36.9 (28 Jun 2023 20:15)  T(F): 98.2 (29 Jun 2023 12:00), Max: 98.5 (28 Jun 2023 20:15)  HR: 67 (29 Jun 2023 12:00) (63 - 83)  BP: 103/55 (29 Jun 2023 12:00) (86/52 - 156/84)  BP(mean): 73 (29 Jun 2023 12:00) (63 - 102)  RR: 24 (29 Jun 2023 12:00) (13 - 24)  SpO2: 97% (29 Jun 2023 12:00) (95% - 99%)    Parameters below as of 29 Jun 2023 07:00  Patient On (Oxygen Delivery Method): room air         CAPILLARY BLOOD GLUCOSE      POCT Blood Glucose.: 306 mg/dL (29 Jun 2023 12:40)  POCT Blood Glucose.: 358 mg/dL (29 Jun 2023 11:33)  POCT Blood Glucose.: 240 mg/dL (29 Jun 2023 07:51)  POCT Blood Glucose.: 184 mg/dL (28 Jun 2023 21:07)  POCT Blood Glucose.: 175 mg/dL (28 Jun 2023 16:05)      Cholesterol, Serum: 113 mg/dL (05.19.21 @ 08:36)     HDL Cholesterol, Serum: 22 mg/dL (05.19.21 @ 08:36)     LDL Cholesterol Calculated: 66 mg/dL (05.19.21 @ 08:36)     DIET: CC  >50%        
53yo male with PMH of T2DM on insulin, HTN, HLD who presents to the ED with posterior neck pain radiating to the left chest, admitted for NSTEMI.  
Physical Exam:   Vital Signs Last 24 Hrs  T(C): 36.8 (30 Jun 2023 12:00), Max: 37 (30 Jun 2023 04:00)  T(F): 98.2 (30 Jun 2023 12:00), Max: 98.6 (30 Jun 2023 04:00)  HR: 76 (30 Jun 2023 12:00) (65 - 82)  BP: 93/59 (30 Jun 2023 12:00) (92/55 - 125/60)  BP(mean): 77 (30 Jun 2023 12:00) (64 - 88)  RR: 17 (30 Jun 2023 12:00) (17 - 27)  SpO2: 98% (30 Jun 2023 12:00) (96% - 98%)    Parameters below as of 30 Jun 2023 12:00  Patient On (Oxygen Delivery Method): room air       CAPILLARY BLOOD GLUCOSE      POCT Blood Glucose.: 314 mg/dL (30 Jun 2023 12:22)  POCT Blood Glucose.: 268 mg/dL (30 Jun 2023 08:12)  POCT Blood Glucose.: 280 mg/dL (29 Jun 2023 21:44)  POCT Blood Glucose.: 297 mg/dL (29 Jun 2023 17:09)      Cholesterol, Serum: 113 mg/dL (05.19.21 @ 08:36)     HDL Cholesterol, Serum: 22 mg/dL (05.19.21 @ 08:36)     LDL Cholesterol Calculated: 66 mg/dL (05.19.21 @ 08:36)     DIET: CC  >50%        
51yo male with PMH of T2DM on insulin, HTN, HLD who presents to the ED with posterior neck pain radiating to the left chest, admitted for NSTEMI.

## 2023-06-30 NOTE — PROGRESS NOTE ADULT - PROBLEM SELECTOR PROBLEM 1
Detail Level: Simple
Type 2 diabetes mellitus
NSTEMI (non-ST elevation myocardial infarction)
Type 2 diabetes mellitus
NSTEMI (non-ST elevation myocardial infarction)
Type 2 diabetes mellitus

## 2023-06-30 NOTE — PROGRESS NOTE ADULT - SUBJECTIVE AND OBJECTIVE BOX
Department of Cardiology                                                                     Division of Interventional Cardiology                                                                  [facility]/ [address] [address]                                                                             Telephone: [phone]                                 Post Staged Procedure Note from 6/27 and 6/28 :  52y  Male is s/p NSTEMI, now s/p left heart catheterization/ Staged PCI-mRCA & RPL via RRA (06/27), PCI x2 to LAD & LCX via RFA (6/28) ( now with Angioseal closure agent) by Dr. Luiza Bunn    06/30/23-Pt seen and examined this am @ bedside- No further CP occurrences  Repeat cardiac enzymes are down trending. RRA and RFA access sites without any bleeding, drainage, or hematoma formation. + Pulses  Pt education and information regarding follow-up plan and medication compliance provided via [place] citizen interpretor-pt verbalizes understanding. DAPT compliance reinforced Diabetes Educator plans in progress.  No overnight events.    Subjective/ROS:  Denies CP, palpitations, SOB, N/V, fever/chills, dizziness, weakness, numbness/tingling.      HPI:  51yo male with PMH of T2DM on insulin, HTN, HLD who presents to the ED with posterior neck pain radiating to the left chest.  used, ID# [id number]. Pt states around noon yesterday he developed posterior neck pain radiating up to the back of his head. Around 3pm he began to have left sided chest pain while sedentary, intermittent, non-radiating, resolving within a few seconds. Last night around 9pm he developed worsening left sided chest pain with radiation to his neck and left arm, along with diaphoresis and SOB, described it as a choking sensation in his chest/throat. Pt notes he has been having dyspnea on exertion for the past 3mos, went to see his cardiologist and had a stress test done 2 mos ago that he was unable to complete due to dyspnea, was told the test was abnormal but unsure of what the follow up recommendations were. He reports no history of smoking. During time of evaluation patient still reporting mild left sided chest pain, though improved.    In the ED:  Vitals: Tmax 98.1F | HR 86 | /81> 131/79 | RR 18 | SpO2 98% on RA  Labs: trop 14.3 > 310.4, glucose 368, Na 133  CXR: on wet read, grossly clear lungs  ECG: NSR, rate 83, LAD  Received ASA 324mg, heparin gtt, 6u Humulin (27 Jun 2023 08:36)      PAST MEDICAL & SURGICAL HISTORY:  Diabetes mellitus    Hypercholesteremia    No significant past surgical history    MEDICATIONS  (STANDING):  aspirin enteric coated 81 milliGRAM(s) Oral daily  atorvastatin 80 milliGRAM(s) Oral at bedtime  dextrose 5%. 1000 milliLiter(s) (100 mL/Hr) IV Continuous <Continuous>  dextrose 5%. 1000 milliLiter(s) (50 mL/Hr) IV Continuous <Continuous>  dextrose 50% Injectable 25 Gram(s) IV Push once  dextrose 50% Injectable 12.5 Gram(s) IV Push once  dextrose 50% Injectable 25 Gram(s) IV Push once  gabapentin 300 milliGRAM(s) Oral three times a day  glucagon  Injectable 1 milliGRAM(s) IntraMuscular once  insulin glargine Injectable (LANTUS) 25 Unit(s) SubCutaneous at bedtime  insulin lispro (ADMELOG) corrective regimen sliding scale   SubCutaneous three times a day before meals  insulin lispro (ADMELOG) corrective regimen sliding scale   SubCutaneous at bedtime  insulin lispro Injectable (ADMELOG) 5 Unit(s) SubCutaneous three times a day before meals  losartan 25 milliGRAM(s) Oral daily  metoprolol tartrate 25 milliGRAM(s) Oral every 12 hours  sodium chloride 0.9%. 500 milliLiter(s) (100 mL/Hr) IV Continuous <Continuous>  ticagrelor 90 milliGRAM(s) Oral every 12 hours    MEDICATIONS  (PRN):  acetaminophen     Tablet .. 650 milliGRAM(s) Oral every 6 hours PRN Temp greater or equal to 38C (100.4F), Mild Pain (1 - 3)  dextrose Oral Gel 15 Gram(s) Oral once PRN Blood Glucose LESS THAN 70 milliGRAM(s)/deciliter  fentaNYL    Injectable 25 MICROGram(s) IV Push every 4 hours PRN Severe Pain (7 - 10)  nitroglycerin     SubLingual 0.4 milliGRAM(s) SubLingual every 5 minutes PRN Chest Pain    Allergies: No Known Allergies                            14.3   10.35 )-----------( 176      ( 30 Jun 2023 04:58 )             42.1     06-30    139  |  104  |  16  ----------------------------<  239<H>  4.9   |  31  |  1.00    Ca    8.7      30 Jun 2023 04:58  Mg     2.1     06-30            Vital Signs Last 24 Hrs  T(C): 37 (30 Jun 2023 04:00), Max: 37 (30 Jun 2023 04:00)  T(F): 98.6 (30 Jun 2023 04:00), Max: 98.6 (30 Jun 2023 04:00)  HR: 75 (30 Jun 2023 04:00) (65 - 82)  BP: 110/61 (30 Jun 2023 04:00) (86/52 - 125/60)  BP(mean): 80 (30 Jun 2023 04:00) (63 - 88)  RR: 18 (30 Jun 2023 04:00) (18 - 27)  SpO2: 97% (30 Jun 2023 04:00) (96% - 98%)    Parameters below as of 29 Jun 2023 19:00  Patient On (Oxygen Delivery Method): room air      Constitutional: NAD  Neuro: A+O x 3, non-focal, speech clear and intact  HEENT: NC/AT, PERRL, EOMI, anicteric sclerae, oral mucosa pink and moist  Neck: supple, no JVD  CV: regular rate, regular rhythm, +S1S2, no murmurs or rub  Pulm/chest: lung sounds CTA and equal bilaterally, no accessory muscle use noted  Abd: soft, NT, ND, +BS  Ext: KAUR x 4, no C/C/E  Access site: R wrist C/D/I/soft without hematoma, distal motor/neuro/circ intact. R radial pulse 2+. RFA soft Nontender no bleeding or hematoma formation + DP and PT pulses  Skin: warm, well perfused  Psych: calm, appropriate affect  [id number]    DIscharge Education related to:  Restricted use with no heavy lifting of affected arm for 48 hours.  No submerging the arm in water for 48 hours.  You may start showering today.  Call your doctor for any bleeding, swelling, loss of sensation in the hand or fingers, or fingers turning blue.  If heavy bleeding or large lumps form, hold pressure at the spot and come to the Emergency Room.  - Bruising at the groin, sometimes extending down the leg, and/or a small lump under the skin at the groin access site is normal and will resolve within 2 – 3 weeks.   - Occasional skipped beats or palpitations that last for a few beats are common and generally resolve within 1-2 months.   - You may walk and take stairs at a regular pace.   - Do not perform any exercise more strenuous than walking for 1 week.   - Do not strain or lift heavy objects for 1 week.  - You may shower the day after the procedure.  - Do not soak in water (such as tub baths, hot tubs, swimming, etc.) for 1 week.   - You may resume all other activities the day after the procedure.  Call your doctor if:   - you notice bleeding, redness, drainage, swelling, increased tenderness or a hot sensation around the catheter insertion site.   - your temperature is greater than 100 degrees F for more than 24 hours.  - your rapid heart rhythm returns.  - you have any questions or concerns regarding the procedure.  If significant bleeding and/or a large lump (the size of a golf ball or bigger) occurs:  - Lie flat and apply continuous direct pressure just above the puncture site for at least 10 minutes  - If the issue resolves, notify your physician immediately.    - If the bleeding cannot be controlled, please seek immediate medical attention.  If you experience increased difficulty breathing or chest pain, or if you faint or have dizzy spells, please seek immediate medical attention.

## 2023-06-30 NOTE — PROGRESS NOTE ADULT - SUBJECTIVE AND OBJECTIVE BOX
Patient is a 52y old  Male who presents with a chief complaint of NSTEMI (30 Jun 2023 08:22)    updates: pt NAD, no complains, no SOB or CP, freestyle christiano in place on LUE, readings above 200, discussed discharge plan with insulin pens and CGM, Januvia 50mg daily and metformin 500mg BID, pt demonstrates understanding and agrees w/ POC. pt has appt with endocrinologist Dr. Kenneth Toth in 3 weeks. all medications approved M2B. reviewed BG targets 80-130mg/dL fasting, <180 2 hours after meals, educated patient on consistent carb intake, balanced meals when taking fast acting insulin to avoid hypoglycemia. verbal education and handouts provided.    HPI:  51yo male with PMH of T2DM on insulin, HTN, HLD who presents to the ED with posterior neck pain radiating to the left chest.  used, ID# [id number]. Pt states around noon yesterday he developed posterior neck pain radiating up to the back of his head. Around 3pm he began to have left sided chest pain while sedentary, intermittent, non-radiating, resolving within a few seconds. Last night around 9pm he developed worsening left sided chest pain with radiation to his neck and left arm, along with diaphoresis and SOB, described it as a choking sensation in his chest/throat. Pt notes he has been having dyspnea on exertion for the past 3mos, went to see his cardiologist and had a stress test done 2 mos ago that he was unable to complete due to dyspnea, was told the test was abnormal but unsure of what the follow up recommendations were. He reports no history of smoking. During time of evaluation patient still reporting mild left sided chest pain, though improved.    In the ED:  Vitals: Tmax 98.1F | HR 86 | /81> 131/79 | RR 18 | SpO2 98% on RA  Labs: trop 14.3 > 310.4, glucose 368, Na 133  CXR: on wet read, grossly clear lungs  ECG: NSR, rate 83, LAD  Received ASA 324mg, heparin gtt, 6u Humulin (27 Jun 2023 08:36)      PAST MEDICAL & SURGICAL HISTORY:  Diabetes mellitus      Hypercholesteremia      No significant past surgical history      Allergies    No Known Allergies    Intolerances        MEDICATIONS  (STANDING):  aspirin enteric coated 81 milliGRAM(s) Oral daily  atorvastatin 80 milliGRAM(s) Oral at bedtime  dextrose 5%. 1000 milliLiter(s) (100 mL/Hr) IV Continuous <Continuous>  dextrose 5%. 1000 milliLiter(s) (50 mL/Hr) IV Continuous <Continuous>  dextrose 50% Injectable 25 Gram(s) IV Push once  dextrose 50% Injectable 12.5 Gram(s) IV Push once  dextrose 50% Injectable 25 Gram(s) IV Push once  gabapentin 300 milliGRAM(s) Oral three times a day  glucagon  Injectable 1 milliGRAM(s) IntraMuscular once  insulin glargine Injectable (LANTUS) 25 Unit(s) SubCutaneous at bedtime  insulin lispro (ADMELOG) corrective regimen sliding scale   SubCutaneous three times a day before meals  insulin lispro (ADMELOG) corrective regimen sliding scale   SubCutaneous at bedtime  insulin lispro Injectable (ADMELOG) 7 Unit(s) SubCutaneous three times a day before meals  losartan 25 milliGRAM(s) Oral daily  metoprolol tartrate 25 milliGRAM(s) Oral every 12 hours  ticagrelor 90 milliGRAM(s) Oral every 12 hours

## 2023-07-07 ENCOUNTER — NON-APPOINTMENT (OUTPATIENT)
Age: 53
End: 2023-07-07

## 2023-07-07 ENCOUNTER — APPOINTMENT (OUTPATIENT)
Dept: CARDIOLOGY | Facility: CLINIC | Age: 53
End: 2023-07-07
Payer: MEDICAID

## 2023-07-07 DIAGNOSIS — I10 ESSENTIAL (PRIMARY) HYPERTENSION: ICD-10-CM

## 2023-07-07 DIAGNOSIS — E78.00 PURE HYPERCHOLESTEROLEMIA, UNSPECIFIED: ICD-10-CM

## 2023-07-07 PROCEDURE — 93000 ELECTROCARDIOGRAM COMPLETE: CPT

## 2023-07-07 PROCEDURE — 99214 OFFICE O/P EST MOD 30 MIN: CPT | Mod: 25

## 2023-07-07 RX ORDER — TICAGRELOR 90 MG/1
90 TABLET ORAL
Qty: 60 | Refills: 3 | Status: ACTIVE | COMMUNITY
Start: 2023-07-07 | End: 1900-01-01

## 2023-08-24 ENCOUNTER — APPOINTMENT (OUTPATIENT)
Dept: CARDIOLOGY | Facility: CLINIC | Age: 53
End: 2023-08-24
Payer: MEDICAID

## 2023-08-24 ENCOUNTER — NON-APPOINTMENT (OUTPATIENT)
Age: 53
End: 2023-08-24

## 2023-08-24 VITALS
DIASTOLIC BLOOD PRESSURE: 79 MMHG | HEART RATE: 69 BPM | BODY MASS INDEX: 24.59 KG/M2 | WEIGHT: 144 LBS | OXYGEN SATURATION: 96 % | HEIGHT: 64 IN | SYSTOLIC BLOOD PRESSURE: 137 MMHG

## 2023-08-24 DIAGNOSIS — E11.9 TYPE 2 DIABETES MELLITUS W/OUT COMPLICATIONS: ICD-10-CM

## 2023-08-24 DIAGNOSIS — Z78.9 OTHER SPECIFIED HEALTH STATUS: ICD-10-CM

## 2023-08-24 PROCEDURE — 93000 ELECTROCARDIOGRAM COMPLETE: CPT

## 2023-08-24 PROCEDURE — 99215 OFFICE O/P EST HI 40 MIN: CPT | Mod: 25

## 2023-08-24 RX ORDER — INSULIN LISPRO 100 [IU]/ML
100 INJECTION, SOLUTION INTRAVENOUS; SUBCUTANEOUS
Refills: 0 | Status: ACTIVE | COMMUNITY

## 2023-08-24 RX ORDER — ATORVASTATIN CALCIUM 40 MG/1
40 TABLET, FILM COATED ORAL
Refills: 0 | Status: ACTIVE | COMMUNITY

## 2023-08-24 RX ORDER — METFORMIN HYDROCHLORIDE 500 MG/1
500 TABLET, COATED ORAL
Refills: 0 | Status: ACTIVE | COMMUNITY

## 2023-08-24 RX ORDER — INSULIN GLARGINE 100 [IU]/ML
100 INJECTION, SOLUTION SUBCUTANEOUS
Refills: 0 | Status: ACTIVE | COMMUNITY

## 2023-08-24 RX ORDER — SITAGLIPTIN 25 MG/1
25 TABLET, FILM COATED ORAL
Refills: 0 | Status: ACTIVE | COMMUNITY

## 2023-08-24 RX ORDER — ASPIRIN ENTERIC COATED TABLETS 81 MG 81 MG/1
81 TABLET, DELAYED RELEASE ORAL
Refills: 0 | Status: ACTIVE | COMMUNITY

## 2023-08-24 RX ORDER — LOSARTAN POTASSIUM 25 MG/1
25 TABLET, FILM COATED ORAL
Refills: 0 | Status: ACTIVE | COMMUNITY

## 2023-08-24 RX ORDER — METOPROLOL TARTRATE 25 MG/1
25 TABLET, FILM COATED ORAL TWICE DAILY
Qty: 180 | Refills: 0 | Status: ACTIVE | COMMUNITY

## 2023-08-24 NOTE — HISTORY OF PRESENT ILLNESS
[FreeTextEntry1] : Dino presented to the office today for a follow-up evaluation.  We recently saw him in the hospital.  He is now 52 years old, with a history of type 2 diabetes, hypertension and hypercholesterolemia.  He presented to the emergency department on June 27 with chest discomfort and neck discomfort.  He also reported several months of dyspnea on exertion, and reports that a relatively recent stress test some months before was abnormal, with no definitive plans for follow-up having been made at that time.  He does not have any ischemic changes on his EKG, but evolved positive troponins, and was found to have a non-ST segment elevation myocardial infarction.  Cardiac catheterization was performed, revealing triple-vessel coronary artery disease.  On June 27, he had a drug-eluting stent placed to the mid RCA and the right posterolateral branch.  On June 28, he had PCI to the proximal and mid LAD.  Echocardiography performed at that time revealed an ejection fraction at the lower limits of normal at 53%, with mild hypokinesis of the mid and basal anterolateral walls.  He was discharged on medical therapy, including dual antiplatelet therapy, statin, beta-blocker and an angiotensin receptor blocker.  He presents to the office today having been feeling well from a cardiovascular perspective.  He reports no reproducible chest discomfort or shortness of breath with activity.  At work, late in the day, but only at that time, he might experience a discomfort in various locations in his chest, with some association to the physical activity.  This is quite different from his symptoms of his myocardial infarction recently.  If he exerts himself at different times of the day, he does not feel that discomfort.  He denies orthopnea, PND and lower extremity edema.  He denies palpitations, dizziness and syncope.  He has tried to remain physically active.  He has been compliant with all his medications.

## 2023-08-24 NOTE — ASSESSMENT
[FreeTextEntry1] : I reviewed his recent hospital course.  I reviewed his cardiac catheterization reports.  I reviewed his echocardiogram report.

## 2023-08-24 NOTE — DISCUSSION/SUMMARY
[FreeTextEntry1] : Wale was recently was discovered to have severe coronary artery disease, for which he had multivessel PCI.  This occurs in a background of hypertension, insulin requiring diabetes, and hypercholesterolemia.  He now presents feeling generally well, though with some atypical chest discomfort, different from his prior anginal symptoms.  Overall, I suspect that he is adequately revascularized at present, and that his symptoms are noncardiac.  I would not change his medications for now.  We will keep an eye on this over time.  Noting inferior hypokinesis on his hospital echo, I suggested repeating his echocardiogram to see if he has had recovery of function and improvement in his ejection fraction.  He will schedule a carotid ultrasound.  I will bring him back in about 6 weeks to be reevaluated, and to have blood work done at that time. [EKG obtained to assist in diagnosis and management of assessed problem(s)] : EKG obtained to assist in diagnosis and management of assessed problem(s)

## 2023-09-01 ENCOUNTER — APPOINTMENT (OUTPATIENT)
Dept: CARDIOLOGY | Facility: CLINIC | Age: 53
End: 2023-09-01
Payer: MEDICAID

## 2023-09-01 PROCEDURE — 93306 TTE W/DOPPLER COMPLETE: CPT

## 2023-09-01 PROCEDURE — 93308 TTE F-UP OR LMTD: CPT

## 2023-09-01 PROCEDURE — 93880 EXTRACRANIAL BILAT STUDY: CPT

## 2023-10-05 ENCOUNTER — APPOINTMENT (OUTPATIENT)
Dept: CARDIOLOGY | Facility: CLINIC | Age: 53
End: 2023-10-05
Payer: MEDICAID

## 2023-10-05 ENCOUNTER — NON-APPOINTMENT (OUTPATIENT)
Age: 53
End: 2023-10-05

## 2023-10-05 VITALS
OXYGEN SATURATION: 97 % | WEIGHT: 149 LBS | HEART RATE: 71 BPM | HEIGHT: 64 IN | DIASTOLIC BLOOD PRESSURE: 74 MMHG | BODY MASS INDEX: 25.44 KG/M2 | SYSTOLIC BLOOD PRESSURE: 120 MMHG

## 2023-10-05 PROCEDURE — 99214 OFFICE O/P EST MOD 30 MIN: CPT | Mod: 25

## 2023-10-05 PROCEDURE — 93000 ELECTROCARDIOGRAM COMPLETE: CPT

## 2023-10-09 PROBLEM — E78.00 HYPERCHOLESTEROLEMIA: Status: ACTIVE | Noted: 2023-08-24

## 2023-10-09 PROBLEM — I10 ESSENTIAL HYPERTENSION: Status: ACTIVE | Noted: 2023-08-24

## 2023-11-02 NOTE — H&P ADULT - PROBLEM SELECTOR PLAN 6
Order placed for the doctor he saw   Full Code Discussed with patient's daughter and his wife about goals of care. Patient is a Full Code.

## 2023-12-03 ENCOUNTER — NON-APPOINTMENT (OUTPATIENT)
Age: 53
End: 2023-12-03

## 2024-01-08 ENCOUNTER — APPOINTMENT (OUTPATIENT)
Dept: CARDIOLOGY | Facility: CLINIC | Age: 54
End: 2024-01-08
Payer: MEDICAID

## 2024-01-08 ENCOUNTER — NON-APPOINTMENT (OUTPATIENT)
Age: 54
End: 2024-01-08

## 2024-01-08 VITALS
OXYGEN SATURATION: 98 % | SYSTOLIC BLOOD PRESSURE: 116 MMHG | HEIGHT: 64 IN | BODY MASS INDEX: 25.1 KG/M2 | HEART RATE: 74 BPM | DIASTOLIC BLOOD PRESSURE: 69 MMHG | WEIGHT: 147 LBS

## 2024-01-08 DIAGNOSIS — I25.10 ATHEROSCLEROTIC HEART DISEASE OF NATIVE CORONARY ARTERY W/OUT ANGINA PECTORIS: ICD-10-CM

## 2024-01-08 PROCEDURE — 93000 ELECTROCARDIOGRAM COMPLETE: CPT

## 2024-01-08 PROCEDURE — 99214 OFFICE O/P EST MOD 30 MIN: CPT | Mod: 25

## 2024-01-08 NOTE — DISCUSSION/SUMMARY
[FreeTextEntry1] : Wale was recently was discovered to have severe coronary artery disease, for which he had multivessel PCI.  This occurs in a background of hypertension, insulin requiring diabetes, and hypercholesterolemia.  He now presents feeling generally well, though with some atypical chest discomfort, different from his prior anginal symptoms.  I reviewed his echocardiogram from September 1, 2023.  This revealed an overall normal ejection fraction without segmental wall motion abnormalities.  There was minimal mitral regurgitation.  Carotid ultrasound was performed September 1, 2023.  This revealed mild to moderate bilateral atherosclerosis.  Cardiac catheterization from June 28, 2023 revealed severe multivessel coronary artery disease, for which staged PCI was performed.  As a precaution, I have suggested an exercise stress test.  I will be in contact with him to discuss the results.  If all is well, he will see me in 6 months. [EKG obtained to assist in diagnosis and management of assessed problem(s)] : EKG obtained to assist in diagnosis and management of assessed problem(s)

## 2024-01-08 NOTE — HISTORY OF PRESENT ILLNESS
[FreeTextEntry1] : Wale presented to the office today for a follow-up evaluation.  He was last seen in the office in October, 2023.  He is now 53 years old, with a history of type 2 diabetes, hypertension and hypercholesterolemia.  He presented to the emergency department on June 27, 2023 with chest discomfort and neck discomfort.  He also reported several months of dyspnea on exertion, and reports that a relatively recent stress test some months before was abnormal, with no definitive plans for follow-up having been made at that time.  He did not have any ischemic changes on his EKG, but evolved positive troponins, and was found to have a non-ST segment elevation myocardial infarction.  Cardiac catheterization was performed, revealing triple-vessel coronary artery disease.  On June 27, he had a drug-eluting stent placed to the mid RCA and the right posterolateral branch.  On June 28, he had PCI to the proximal and mid LAD.  Echocardiography performed at that time revealed an ejection fraction at the lower limits of normal at 53%, with mild hypokinesis of the mid and basal anterolateral walls.  He was discharged on medical therapy, including dual antiplatelet therapy, statin, beta-blocker and an angiotensin receptor blocker.  He presents to the office today having been feeling well from a cardiovascular perspective.  He reports no reproducible chest discomfort or shortness of breath with activity.  He reports a discomfort in the back of his neck and in his head when he takes a deep breath, intermittently.  He feels "tired "when he walks, along with some shortness of breath.  This tends to occur only when he walks for 2 or 3 hours at a time, and does not occur with sudden physical activity.  It is never associated with his prior anginal symptoms.  He denies orthopnea, PND and lower extremity edema.  He denies palpitations, dizziness and syncope.  He has been compliant with all his medications.

## 2024-01-08 NOTE — CARDIOLOGY SUMMARY
[de-identified] : August 24, 2023 normal sinus rhythm October 5, 2023 normal sinus rhythm, poor rwp  ant mi January 8, 2024 normal sinus rhythm

## 2024-01-24 ENCOUNTER — APPOINTMENT (OUTPATIENT)
Dept: CARDIOLOGY | Facility: CLINIC | Age: 54
End: 2024-01-24
Payer: MEDICAID

## 2024-01-24 PROCEDURE — 93015 CV STRESS TEST SUPVJ I&R: CPT

## 2024-02-21 ENCOUNTER — NON-APPOINTMENT (OUTPATIENT)
Age: 54
End: 2024-02-21

## 2024-02-23 ENCOUNTER — APPOINTMENT (OUTPATIENT)
Dept: CARDIOLOGY | Facility: CLINIC | Age: 54
End: 2024-02-23
Payer: MEDICAID

## 2024-02-23 PROCEDURE — A9500: CPT

## 2024-02-23 PROCEDURE — 78452 HT MUSCLE IMAGE SPECT MULT: CPT

## 2024-02-23 PROCEDURE — 93015 CV STRESS TEST SUPVJ I&R: CPT

## 2024-04-11 NOTE — ED ADULT NURSE NOTE - SKIN TEMPERATURE
Pt arrived via EMS c/o of med refill secondary to being out of his Symbicort. Pt 100% room air, lungs clear and equal bilaterally   
warm

## 2024-04-18 ENCOUNTER — NON-APPOINTMENT (OUTPATIENT)
Age: 54
End: 2024-04-18

## 2024-05-07 ENCOUNTER — NON-APPOINTMENT (OUTPATIENT)
Age: 54
End: 2024-05-07

## 2024-05-08 ENCOUNTER — OUTPATIENT (OUTPATIENT)
Dept: OUTPATIENT SERVICES | Facility: HOSPITAL | Age: 54
LOS: 1 days | Discharge: HOME | End: 2024-05-08
Payer: MEDICAID

## 2024-05-08 ENCOUNTER — APPOINTMENT (OUTPATIENT)
Dept: WOUND CARE | Facility: HOSPITAL | Age: 54
End: 2024-05-08
Payer: MEDICAID

## 2024-05-08 VITALS
WEIGHT: 147 LBS | BODY MASS INDEX: 25.1 KG/M2 | HEART RATE: 72 BPM | TEMPERATURE: 98.7 F | SYSTOLIC BLOOD PRESSURE: 124 MMHG | OXYGEN SATURATION: 96 % | HEIGHT: 64 IN | DIASTOLIC BLOOD PRESSURE: 76 MMHG | RESPIRATION RATE: 18 BRPM

## 2024-05-08 DIAGNOSIS — M20.41 OTHER HAMMER TOE(S) (ACQUIRED), RIGHT FOOT: ICD-10-CM

## 2024-05-08 DIAGNOSIS — E11.40 TYPE 2 DIABETES MELLITUS WITH DIABETIC NEUROPATHY, UNSPECIFIED: ICD-10-CM

## 2024-05-08 DIAGNOSIS — I10 ESSENTIAL (PRIMARY) HYPERTENSION: ICD-10-CM

## 2024-05-08 DIAGNOSIS — E78.00 PURE HYPERCHOLESTEROLEMIA, UNSPECIFIED: ICD-10-CM

## 2024-05-08 DIAGNOSIS — S90.221A CONTUSION OF RIGHT LESSER TOE(S) WITH DAMAGE TO NAIL, INITIAL ENCOUNTER: ICD-10-CM

## 2024-05-08 DIAGNOSIS — E11.59 TYPE 2 DIABETES MELLITUS WITH OTHER CIRCULATORY COMPLICATIONS: ICD-10-CM
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
## 2024-05-08 DIAGNOSIS — I25.10 ATHEROSCLEROTIC HEART DISEASE OF NATIVE CORONARY ARTERY WITHOUT ANGINA PECTORIS: ICD-10-CM

## 2024-05-08 DIAGNOSIS — M20.40 OTHER HAMMER TOE(S) (ACQUIRED), UNSPECIFIED FOOT: ICD-10-CM
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
## 2024-05-08 DIAGNOSIS — S90.129A CONTUSION OF UNSPECIFIED LESSER TOE(S) WITHOUT DAMAGE TO NAIL, INITIAL ENCOUNTER: ICD-10-CM

## 2024-05-08 DIAGNOSIS — Z95.5 PRESENCE OF CORONARY ANGIOPLASTY IMPLANT AND GRAFT: ICD-10-CM
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
## 2024-05-08 DIAGNOSIS — Z79.899 OTHER LONG TERM (CURRENT) DRUG THERAPY: ICD-10-CM

## 2024-05-08 PROCEDURE — 73630 X-RAY EXAM OF FOOT: CPT

## 2024-05-08 PROCEDURE — 99203 OFFICE O/P NEW LOW 30 MIN: CPT

## 2024-05-08 PROCEDURE — 73630 X-RAY EXAM OF FOOT: CPT | Mod: 26,RT

## 2024-05-08 PROCEDURE — G0463: CPT

## 2024-05-24 ENCOUNTER — OUTPATIENT (OUTPATIENT)
Dept: OUTPATIENT SERVICES | Facility: HOSPITAL | Age: 54
LOS: 1 days | End: 2024-05-24
Payer: MEDICAID

## 2024-05-24 DIAGNOSIS — M20.40 OTHER HAMMER TOE(S) (ACQUIRED), UNSPECIFIED FOOT: ICD-10-CM

## 2024-05-24 PROCEDURE — 93923 UPR/LXTR ART STDY 3+ LVLS: CPT

## 2024-05-24 PROCEDURE — 93923 UPR/LXTR ART STDY 3+ LVLS: CPT | Mod: 26

## 2024-06-04 PROBLEM — S90.221A SUBUNGUAL HEMATOMA OF TOE OF RIGHT FOOT, INITIAL ENCOUNTER: Status: ACTIVE | Noted: 2024-06-04

## 2024-06-04 PROBLEM — M20.40 HAMMER TOE, ACQUIRED: Status: ACTIVE | Noted: 2024-05-08

## 2024-06-04 NOTE — HISTORY OF PRESENT ILLNESS
[FreeTextEntry1] : Pt is a 53 yr old M with a Hx of triple vessel Dx, NSTEMI w/ cardiac stenting (6/2023), T2DM w/ some degree of peripheral neuropathy, HLD, HTN. Pt is being seen for a right foot 2nd toenail discoloration that has been present for approximately 3 weeks. 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 Pt was referred by Urgent care for treatment here.

## 2024-06-04 NOTE — PLAN
[FreeTextEntry1] : .Patient seen and evaluated. Discussed etiology and treatment plan. Patient verbalized understanding. Ordered X- rays  right foot to r/o OM or any osseous or soft tissue pathology. Vascular studies ordered and will request vascular consult.  Discussed with patient to wear supportive shoe gear with wide toed shoes and extra depth in toe box to accommodate the deformity. Discussed with patient to perform daily foot checks and monitor for any new lesions, open wounds or calluses and to return to clinic at the earliest. Patient to return to clinic in 4 weeks,  Spent 30 minutes for patient care and medical decision making.

## 2024-06-04 NOTE — REVIEW OF SYSTEMS
[Negative] : Psychiatric [Fever] : no fever [Chills] : no chills [Eye Pain] : no eye pain [Red Eyes] : eyes not red [Eyesight Problems] : no eyesight problems [Earache] : no earache [Loss Of Hearing] : no hearing loss [Nosebleeds] : no nosebleeds [Chest Pain] : no chest pain [Shortness Of Breath] : no shortness of breath [Cough] : no cough [Abdominal Pain] : no abdominal pain [Vomiting] : no vomiting [Diarrhea] : no diarrhea [FreeTextEntry5] : CAD with stents, Hx of MI and HTN  [de-identified] : Subungal hematoma right 2nd digit

## 2024-06-04 NOTE — PHYSICAL EXAM
[0] : right 0 [1+] : left 1+ [Alert] : alert [Oriented to Person] : oriented to person [Oriented to Place] : oriented to place [de-identified] : AOX3  [de-identified] : 5/5 muscle strength for all muscles and tendons crossing the foot and ankle joints, ankle joint and STJ ROM intact b/l. No pain with calf compression b/l. [de-identified] : Subungal hematoma to the right 2nd digit, no edema, no erythema, no signs of infection  [de-identified] : Light touch diminished to the digits,  [FreeTextEntry1] : right foot 2nd toe nail [de-identified] : subungual hemorrhage  [de-identified] : none [de-identified] : Left Dorsal Pedal: 2+ regular Left Posterior Tibialis: nonpalpable, biphasic via doppler    Right Dorsal Pedal: nonpalpable, biphasic waveforms via doppler  Right Posterior Tibialis: nonpalpable, biphasic waveforms via doppler  Cap refill <3 secs skin: normal, intact, warm  Vascular studies ordered by DPM  [TWNoteComboBox4] : None [TWNoteComboBox5] : No [TWNoteComboBox6] : Traumatic [de-identified] : No [de-identified] : Normal [de-identified] : None [de-identified] : None [de-identified] : None [de-identified] : No

## 2024-06-04 NOTE — ASSESSMENT
[Verbal] : Verbal [Demo] : Demo [Patient] : Patient [Family member] : Family member [Good - alert, interested, motivated] : Good - alert, interested, motivated [Demonstrates independently] : demonstrates independently [Dressing changes] : dressing changes [Foot Care] : foot care [Skin Care] : skin care [Signs and symptoms of infection] : sign and symptoms of infection [Nutrition] : nutrition [How and When to Call] : how and when to call [Labs and Tests] : labs and tests [Patient responsibility to plan of care] : patient responsibility to plan of care [Glycemic Control] : glycemic control [] : Yes [Stable] : stable [Other: ____] : [unfilled] [Ambulatory] : Ambulatory [Not Applicable - Long Term Care/Home Health Agency] : Long Term Care/Home Health Agency: Not Applicable [FreeTextEntry2] : infection prevention maintain skin integrity  xrays/vascular studies foot care encourage glycemic control  [FreeTextEntry3] : initial visit  [FreeTextEntry4] : Xrays ordered, pt to go to [facility] after this appt F/U 1 month Pt provided with [facility] contact info Vascular testing ordered, consult to be submitted once testing is completed.  nutritional consult submitted

## 2024-06-06 ENCOUNTER — APPOINTMENT (OUTPATIENT)
Dept: WOUND CARE | Facility: HOSPITAL | Age: 54
End: 2024-06-06
Payer: MEDICAID

## 2024-06-06 ENCOUNTER — OUTPATIENT (OUTPATIENT)
Dept: OUTPATIENT SERVICES | Facility: HOSPITAL | Age: 54
LOS: 1 days | Discharge: HOME | End: 2024-06-06
Payer: MEDICAID

## 2024-06-06 VITALS
HEART RATE: 78 BPM | RESPIRATION RATE: 16 BRPM | SYSTOLIC BLOOD PRESSURE: 112 MMHG | TEMPERATURE: 98.1 F | HEIGHT: 64 IN | WEIGHT: 147 LBS | BODY MASS INDEX: 25.1 KG/M2 | OXYGEN SATURATION: 95 % | DIASTOLIC BLOOD PRESSURE: 68 MMHG

## 2024-06-06 DIAGNOSIS — M20.40 OTHER HAMMER TOE(S) (ACQUIRED), UNSPECIFIED FOOT: ICD-10-CM

## 2024-06-06 DIAGNOSIS — E11.40 TYPE 2 DIABETES MELLITUS WITH DIABETIC NEUROPATHY, UNSPECIFIED: ICD-10-CM

## 2024-06-06 DIAGNOSIS — M20.41 OTHER HAMMER TOE(S) (ACQUIRED), RIGHT FOOT: ICD-10-CM

## 2024-06-06 DIAGNOSIS — M20.42 OTHER HAMMER TOE(S) (ACQUIRED), RIGHT FOOT: ICD-10-CM

## 2024-06-06 DIAGNOSIS — S90.221D CONTUSION OF RIGHT LESSER TOE(S) WITH DAMAGE TO NAIL, SUBSEQUENT ENCOUNTER: ICD-10-CM

## 2024-06-06 PROCEDURE — 99213 OFFICE O/P EST LOW 20 MIN: CPT

## 2024-06-06 PROCEDURE — G0463: CPT

## 2024-06-06 NOTE — PLAN
[FreeTextEntry1] : Follow up with Dr. STOKES as outpatient. 25 minutes spent for patient care and medical decision making.

## 2024-06-06 NOTE — ASSESSMENT
[FreeTextEntry1] : Mild ASPVD of right lower extremity, otherwise adequate arterial circulation of lower extremities, no vascular intervention is needed at the present time. [Verbal] : Verbal [Demo] : Demo [Patient] : Patient [Good - alert, interested, motivated] : Good - alert, interested, motivated [Demonstrates independently] : demonstrates independently [Dressing changes] : dressing changes [Foot Care] : foot care [Skin Care] : skin care [Signs and symptoms of infection] : sign and symptoms of infection [Nutrition] : nutrition [How and When to Call] : how and when to call [Off-loading] : off-loading [Patient responsibility to plan of care] : patient responsibility to plan of care [Stable] : stable [Home] : Home [Ambulatory] : Ambulatory [Not Applicable - Long Term Care/Home Health Agency] : Long Term Care/Home Health Agency: Not Applicable [] : No [FreeTextEntry2] : Infection Prevention Foot and nail care Nutrition and wound healing Pt Demonstrates use of both nonpharmacological and pharmacological pain relief strategies. [FreeTextEntry4] : Vascular studies interpreted by MD Matrinez as today's visit. MD Martinez recommended patient to frequently ambulate.  MD Martinez recommended patient to see [facility] Patient provided information to [facility] [address]  No F/U needed at wound center.

## 2024-06-06 NOTE — HISTORY OF PRESENT ILLNESS
[FreeTextEntry1] : Patient with right second toe subungual hematoma has had PVR here for vascular evaluation, denies rest pain or claudication.

## 2024-06-06 NOTE — DATA REVIEWED
[FreeTextEntry1] : PVR reveals right CHEN 0.92, left 1.18, borderline flow limitation right ifrapopliteal.

## 2024-06-06 NOTE — PHYSICAL EXAM
[Normal Breath Sounds] : Normal breath sounds [Normal Heart Sounds] : normal heart sounds [2+] : left 2+ [1+] : left 1+ [0] : left 0 [Ankle Swelling (On Exam)] : not present [Ankle Swelling Bilaterally] : bilaterally  [Varicose Veins Of Lower Extremities] : bilaterally [] : bilaterally [Alert] : alert [Oriented to Person] : oriented to person [Oriented to Place] : oriented to place [Oriented to Time] : oriented to time [Calm] : calm [de-identified] : Well-developed, Well-nourished in no acute distress. [de-identified] : Within normal limits. [de-identified] : Within normal limits. [de-identified] : Within normal limits. [de-identified] : Right second toe subungaul hematoma is small, dry, no infection.  [FreeTextEntry1] : Right Foot, 2nd toe. [de-identified] : none [de-identified] : none [de-identified] : traumatic [de-identified] : none [de-identified] : intact [de-identified] : none [de-identified] : none [de-identified] : none [de-identified] : none

## 2024-06-06 NOTE — PHYSICAL EXAM
[Normal Breath Sounds] : Normal breath sounds [Normal Heart Sounds] : normal heart sounds [2+] : left 2+ [1+] : left 1+ [0] : left 0 [Ankle Swelling (On Exam)] : not present [Ankle Swelling Bilaterally] : bilaterally  [Varicose Veins Of Lower Extremities] : bilaterally [] : bilaterally [Alert] : alert [Oriented to Person] : oriented to person [Oriented to Place] : oriented to place [Oriented to Time] : oriented to time [Calm] : calm [de-identified] : Well-developed, Well-nourished in no acute distress. [de-identified] : Within normal limits. [de-identified] : Within normal limits. [de-identified] : Within normal limits. [de-identified] : Right second toe subungaul hematoma is small, dry, no infection.  [FreeTextEntry1] : Right Foot, 2nd toe. [de-identified] : none [de-identified] : none [de-identified] : traumatic [de-identified] : none [de-identified] : intact [de-identified] : none [de-identified] : none [de-identified] : none [de-identified] : none

## 2024-06-06 NOTE — ASSESSMENT
[FreeTextEntry1] : Mild ASPVD of right lower extremity, otherwise adequate arterial circulation of lower extremities, no vascular intervention is needed at the present time. [Verbal] : Verbal [Demo] : Demo [Patient] : Patient [Good - alert, interested, motivated] : Good - alert, interested, motivated [Demonstrates independently] : demonstrates independently [Dressing changes] : dressing changes [Foot Care] : foot care [Skin Care] : skin care [Signs and symptoms of infection] : sign and symptoms of infection [Nutrition] : nutrition [How and When to Call] : how and when to call [Off-loading] : off-loading [Patient responsibility to plan of care] : patient responsibility to plan of care [Stable] : stable [Home] : Home [Ambulatory] : Ambulatory [Not Applicable - Long Term Care/Home Health Agency] : Long Term Care/Home Health Agency: Not Applicable [] : No [FreeTextEntry2] : Infection Prevention Foot and nail care Nutrition and wound healing Pt Demonstrates use of both nonpharmacological and pharmacological pain relief strategies. [FreeTextEntry4] : Vascular studies interpreted by MD Martinez as today's visit. MD Martinez recommended patient to frequently ambulate.  MD Martinez recommended patient to [facility] Patient provided information to [facility] [address]  No F/U needed at wound center.
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
## 2024-06-07 DIAGNOSIS — Z95.5 PRESENCE OF CORONARY ANGIOPLASTY IMPLANT AND GRAFT: ICD-10-CM

## 2024-06-07 DIAGNOSIS — Z79.899 OTHER LONG TERM (CURRENT) DRUG THERAPY: ICD-10-CM

## 2024-06-07 DIAGNOSIS — M20.41 OTHER HAMMER TOE(S) (ACQUIRED), RIGHT FOOT: ICD-10-CM

## 2024-06-07 DIAGNOSIS — I25.10 ATHEROSCLEROTIC HEART DISEASE OF NATIVE CORONARY ARTERY WITHOUT ANGINA PECTORIS: ICD-10-CM
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
## 2024-06-07 DIAGNOSIS — E11.40 TYPE 2 DIABETES MELLITUS WITH DIABETIC NEUROPATHY, UNSPECIFIED: ICD-10-CM

## 2024-06-07 DIAGNOSIS — S90.221D: ICD-10-CM
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
## 2024-06-07 DIAGNOSIS — E78.00 PURE HYPERCHOLESTEROLEMIA, UNSPECIFIED: ICD-10-CM

## 2024-06-07 DIAGNOSIS — I10 ESSENTIAL (PRIMARY) HYPERTENSION: ICD-10-CM

## 2024-07-11 ENCOUNTER — NON-APPOINTMENT (OUTPATIENT)
Age: 54
End: 2024-07-11

## 2024-07-15 ENCOUNTER — APPOINTMENT (OUTPATIENT)
Dept: CARDIOLOGY | Facility: CLINIC | Age: 54
End: 2024-07-15

## 2024-07-23 NOTE — H&P ADULT - NSREFPHYEXREFERTOOTHER_GEN_ALL_CORE
Please Approve or Refuse.  Send to Pharmacy per Pt's Request:      Next Visit Date:  8/16/2024   Last Visit Date: 5/30/2024    Hemoglobin A1C (%)   Date Value   01/30/2024 7.9 (H)   09/18/2023 7.5 (H)   06/15/2023 7.6             ( goal A1C is < 7)   BP Readings from Last 3 Encounters:   05/30/24 100/62   05/06/24 124/72   03/05/24 (!) 104/48          (goal 120/80)  BUN   Date Value Ref Range Status   02/18/2024 30 (H) 8 - 23 mg/dL Final     Creatinine   Date Value Ref Range Status   02/18/2024 1.1 (H) 0.5 - 0.9 mg/dL Final     Potassium   Date Value Ref Range Status   02/18/2024 4.3 3.7 - 5.3 mmol/L Final           
Performed by Attending physician

## 2024-10-17 ENCOUNTER — NON-APPOINTMENT (OUTPATIENT)
Age: 54
End: 2024-10-17

## 2024-10-17 ENCOUNTER — APPOINTMENT (OUTPATIENT)
Dept: CARDIOLOGY | Facility: CLINIC | Age: 54
End: 2024-10-17
Payer: COMMERCIAL

## 2024-10-17 VITALS
BODY MASS INDEX: 24.07 KG/M2 | SYSTOLIC BLOOD PRESSURE: 114 MMHG | HEART RATE: 75 BPM | WEIGHT: 141 LBS | DIASTOLIC BLOOD PRESSURE: 69 MMHG | HEIGHT: 64 IN | OXYGEN SATURATION: 92 %

## 2024-10-17 DIAGNOSIS — I25.10 ATHEROSCLEROTIC HEART DISEASE OF NATIVE CORONARY ARTERY W/OUT ANGINA PECTORIS: ICD-10-CM

## 2024-10-17 PROCEDURE — 93000 ELECTROCARDIOGRAM COMPLETE: CPT

## 2024-10-17 PROCEDURE — G2211 COMPLEX E/M VISIT ADD ON: CPT | Mod: NC

## 2024-10-17 PROCEDURE — 99214 OFFICE O/P EST MOD 30 MIN: CPT

## 2024-10-17 RX ORDER — EMPAGLIFLOZIN 25 MG/1
25 TABLET, FILM COATED ORAL
Refills: 0 | Status: ACTIVE | COMMUNITY

## 2024-11-13 NOTE — ED PROVIDER NOTE - PROGRESS NOTE DETAILS
Sydnie here for a Nurse Check.           DR Roth  is supervising clinician today.    ECOG:   ECOG [11/13/24 1500]   ECOG Performance Status 0       Vital Signs:   Vitals:    11/13/24 1022 11/13/24 1023   BP: (!) 141/83 128/77   BP Location: LUE - Left upper extremity LUE - Left upper extremity   Patient Position: Sitting Sitting   Cuff Size: Regular Regular   Pulse: 78 72   Resp: 16    Temp: 97.6 °F (36.4 °C)    TempSrc: Temporal    SpO2: 97%    Weight: 104.5 kg (230 lb 4.3 oz)         Allergies:    ALLERGIES:   Allergen Reactions    Levofloxacin Other (See Comments)     Tendonitis per patient    Shellfish Allergy   (Food Or Med) Nausea & Vomiting     As reported by pt        Toxicity Assessment:  Adverse Events?  Adverse Events: No    Auditory/Ear  Assessment: Yes (Within Defined Limits)    Cardiac General  Assessment: Yes (Within Defined Limits)    Dermatology/Skin  Assessment: Yes (w/ Exceptions to WDL)  Alopecia: Grade 1    Constitutional  Fatigue: Grade 1    Gastrointestinal  Anorexia: Grade 1    Hemorrhage/Bleeding  Assessment: Yes (Within Defined Limits)    Infection  Assessment: Yes (Within Defined Limits)      Nursing Assessment:  In addition to above toxicity assessment, this RN assessed PT states that she has fatigue but other wise well No signs of active bleeding . HGB 6.5 patient sent t evelin for 1 init blood will recheck labs again on Friday  .  Reviewed when to call sheet       Next appointment scheduled: 2 days   Patient instructed to call the office with any questions or concerns.    Patient Discharged: patient discharged to home per self, ambulatory.   mild pancreatitis noted, improved with fluids.  Pt no longer nausea, left shoulder pain has nearly resolved.  Repeat troponin sent and pending will follow up. Pt abd remains soft, tolerating po.  Results provided to pt and family at bedside, all questions answered.  Advised to follow up with orthopedics and GI.  All questions answered, stable for discharge home with outpatient follow up

## 2025-04-25 ENCOUNTER — APPOINTMENT (OUTPATIENT)
Dept: CARDIOLOGY | Facility: CLINIC | Age: 55
End: 2025-04-25

## 2025-06-03 ENCOUNTER — NON-APPOINTMENT (OUTPATIENT)
Age: 55
End: 2025-06-03

## 2025-06-09 ENCOUNTER — NON-APPOINTMENT (OUTPATIENT)
Age: 55
End: 2025-06-09